# Patient Record
Sex: MALE | Race: WHITE | NOT HISPANIC OR LATINO | Employment: FULL TIME | URBAN - METROPOLITAN AREA
[De-identification: names, ages, dates, MRNs, and addresses within clinical notes are randomized per-mention and may not be internally consistent; named-entity substitution may affect disease eponyms.]

---

## 2017-04-27 ENCOUNTER — TRANSFERRED RECORDS (OUTPATIENT)
Dept: HEALTH INFORMATION MANAGEMENT | Facility: CLINIC | Age: 22
End: 2017-04-27

## 2020-01-29 ENCOUNTER — TRANSFERRED RECORDS (OUTPATIENT)
Dept: HEALTH INFORMATION MANAGEMENT | Facility: CLINIC | Age: 25
End: 2020-01-29

## 2021-03-09 ENCOUNTER — TELEPHONE (OUTPATIENT)
Dept: PSYCHIATRY | Facility: CLINIC | Age: 26
End: 2021-03-09

## 2021-03-09 NOTE — TELEPHONE ENCOUNTER
PSYCHIATRY CLINIC PHONE INTAKE     SERVICES REQUESTED / INTERESTED IN          Med Management    Presenting Problem and Brief History                              What would you like to be seen for? (brief description):  Pt was diagnosed with Bi-polar in 2018 and ADHD in 2019. Pt is currently taking 600mg of lithium, 150mg of effexor, and extended release adderall 40mg, but he takes just one 20mg pill in the morning. Pt is hoping to stop taking the adderall and wanted to discuss that with a new provider. No concerns with sleep or appetite.   Have you received a mental health diagnosis? Yes   Which one (s): Bi-polar and ADHD (most recent diagnosis)  Is there any history of developmental delay?  No   Are you currently seeing a mental health provider?  Yes            Who / month last seen:  Margaret Morales, Psychiatrist at [facility]  Do you have mental health records elsewhere?  Yes  Will you sign a release so we can obtain them?  Yes    Have you ever been hospitalized for psychiatric reasons?  No  Describe:  NA    Do you have current thoughts of self-harm?  No    Do you currently have thoughts of harming others?  No       Substance Use History     Do you have any history of alcohol / illicit drug use?  No  Describe:  NA  Have you ever received treatment for this?  No    Describe:  NA     Social History     Who is the patient's a guardian?  No    Name / number: NA  Have you had an ACT team in last 12 months?  No  Describe: NA   Do you have any current or past legal issues?  No  Describe: NA   OK to leave a detailed voicemail?  Yes    Medical/ Surgical History                                 There is no problem list on file for this patient.         Medications             No current outpatient medications on file.         DISPOSITION      3/9/21 Intake complete. Adding to [facility]-polar [facility].     Mehreen Khoury,

## 2021-05-01 ENCOUNTER — HEALTH MAINTENANCE LETTER (OUTPATIENT)
Age: 26
End: 2021-05-01

## 2021-05-14 ENCOUNTER — VIRTUAL VISIT (OUTPATIENT)
Dept: PSYCHIATRY | Facility: CLINIC | Age: 26
End: 2021-05-14
Attending: PSYCHIATRY & NEUROLOGY
Payer: COMMERCIAL

## 2021-05-14 DIAGNOSIS — Z79.899 ENCOUNTER FOR LONG-TERM CURRENT USE OF MEDICATION: ICD-10-CM

## 2021-05-14 DIAGNOSIS — F31.70 BIPOLAR I DISORDER IN REMISSION (H): Primary | ICD-10-CM

## 2021-05-14 DIAGNOSIS — F90.0 ATTENTION DEFICIT HYPERACTIVITY DISORDER (ADHD), PREDOMINANTLY INATTENTIVE TYPE: ICD-10-CM

## 2021-05-14 PROCEDURE — 90792 PSYCH DIAG EVAL W/MED SRVCS: CPT | Mod: GT | Performed by: STUDENT IN AN ORGANIZED HEALTH CARE EDUCATION/TRAINING PROGRAM

## 2021-05-14 RX ORDER — DEXTROAMPHETAMINE SULFATE, DEXTROAMPHETAMINE SACCHARATE, AMPHETAMINE SULFATE AND AMPHETAMINE ASPARTATE 5; 5; 5; 5 MG/1; MG/1; MG/1; MG/1
20 CAPSULE, EXTENDED RELEASE ORAL 2 TIMES DAILY
COMMUNITY
Start: 2021-03-25 | End: 2021-05-14

## 2021-05-14 RX ORDER — VENLAFAXINE HYDROCHLORIDE 150 MG/1
CAPSULE, EXTENDED RELEASE ORAL
Qty: 30 CAPSULE | Refills: 1 | Status: SHIPPED | OUTPATIENT
Start: 2021-05-14 | End: 2021-06-04

## 2021-05-14 RX ORDER — LITHIUM CARBONATE 300 MG/1
TABLET, FILM COATED, EXTENDED RELEASE ORAL
Qty: 60 TABLET | Refills: 1 | Status: SHIPPED | DISCHARGE
Start: 2021-05-14 | End: 2021-05-17

## 2021-05-14 RX ORDER — VENLAFAXINE HYDROCHLORIDE 150 MG/1
CAPSULE, EXTENDED RELEASE ORAL
COMMUNITY
Start: 2021-05-08 | End: 2021-05-14

## 2021-05-14 RX ORDER — LITHIUM CARBONATE 300 MG/1
TABLET, FILM COATED, EXTENDED RELEASE ORAL
COMMUNITY
Start: 2021-03-25 | End: 2021-05-14

## 2021-05-14 ASSESSMENT — PAIN SCALES - GENERAL: PAINLEVEL: NO PAIN (0)

## 2021-05-14 ASSESSMENT — PATIENT HEALTH QUESTIONNAIRE - PHQ9
10. IF YOU CHECKED OFF ANY PROBLEMS, HOW DIFFICULT HAVE THESE PROBLEMS MADE IT FOR YOU TO DO YOUR WORK, TAKE CARE OF THINGS AT HOME, OR GET ALONG WITH OTHER PEOPLE: EXTREMELY DIFFICULT
SUM OF ALL RESPONSES TO PHQ QUESTIONS 1-9: 21
SUM OF ALL RESPONSES TO PHQ QUESTIONS 1-9: 21

## 2021-05-14 NOTE — PROGRESS NOTES
"Video- Visit Details  Type of service:  video visit for diagnostic assessment  Time of service:    Date:  05/14/2021    Video Start Time:  2:01 PM   Video End Time:  3:30 PM     Reason for video visit:  Patient unable to travel due to Covid-19  Originating Site (patient location):  [facility]   Location- Patient's home  Distant Site (provider location):  [facility]  Mode of Communication:  Video Conference via AmWell  Consent:  Patient has given verbal consent for video visit?: Yes        [facility]  Psychiatry Clinic  [facility] [[facility]]  NEW PATIENT EVALUATION     CARE TEAM:  PCP- No Ref-Primary, Physician. Lux Bailey is   a 25 year old patient who prefers the name \"Efra\" and uses pronouns he, him, his, himself.   Referred by:  Vera Molina    History was provided by: patient who was a good historian.    CHIEF COMPLAINT                                                           \"I am looking for new care for my plethora of mental problems\"     PERTINENT BACKGROUND                        [most recent eval 05/14/21]     Previous diagnoses include depression, bipolar disorder (4 years ago) and ADHD (3 years ago). He recalls he grew up in a family where \"mental illness wasn't real\" but struggled with depression starting in 1st or 2nd grade, \"regularly had teachers pull me aside and ask if everything was OK at home.\" He reports a history of chronic suicidal ideation since 3rd grade, particularly when depressed. He has never \"actually attempted it.\" Has also had intrusive thoughts \"all the time\" about hurting or killing himself, despite not wanting to self-harm or having ever done it.     Episodes began to get worse and worse and he \"played the 'things are going to get better when I get to the next stage of life' game.\" He first started medications at the beginning of college, when he had his first manic episode, likely triggered by many factors " "including lack of sleep and substances, along with stress. Has not had a manic episode since he started lithium about 3 years ago. In college he got care through the university clinic. Then he was working with [facility] for some time, and had a psychiatrist that advised him to immediately go off all his mood stabilizers, which made him concerned and he wanted a second opinion.    Psych pertinent item history includes suicidal ideation, mutiple psychotropic trials , psych hosp (x1, but several psych ER visits) and substance use: alcohol, cannabis and hallucinogens    HISTORY OF PRESENT ILLNESS          Most recent history began about one month ago.    Will reports he is seeking to establish care and consider different options than Adderall for ADHD treatment. He has has several amphetamine formulation trials over time, and has found Adderall XR to be the only thing helpful for his inattentive-type ADHD, per report, but has not taken this in one month because of the concern about long-term effects of it on his heart. He finds it very difficult to focus at work and concerned about his performance without a medication for ADHD.     With regards to mood symptoms, he identifies with having chronic depression but episodes of marline; no marline since he has been on lithium. He feels the lithium has been helpful even though he is at a lower dose of it than he has taken in the past. He has also found Effexor helpful for depression and anxiety. However, he does describe low libido ever since starting psychiatric medications in general about 4 years ago. He is wondering which of these could be causing this side effect.    He describes chronic passive SI since childhood but also ego-dystonic intrusive thoughts of violence against self and others. He has attempted suicide though he reports as a teen he \"almost\" overdosed on acetaminophen. He has not to his knowledge been diagnosed with OCD.    RECENT PSYCH ROS:   Depression: chronic " "passive SI, depressed mood, poor concentration /memory and dysregulation  Elevated:  distractibility   Psychosis:  none  Anxiety:  obsessions [intrusive violent thoughts]  Trauma Related:  mood dysregulation  Eating Disorder: no  Other: no    Adverse Effects: low libido  Pertinent Negative Symptoms: No aggression, psychosis, marline or hypomania    Recent Substance Use:     Alcohol- none  Tobacco- none  Caffeine- 1 cup of coffee per day  Cannabis- none     Opioids- none           Narcan Kit- n/a   Other illicit drugs- none    FAMILY and SOCIAL HISTORY                                                       [per pt report]            Family Hx:  Father, paternal grandfather with alcoholism. Maternal grandfather and mother have bipolar disorder. Brothers with ADHD.  No FHx of   Paternal Grandmother with first heart attack at age 52.    Social Hx:  Financial/Work- working       Partner/ - has a girlfriend  Children- no      Living situation-independently      Social/ Spiritual Support- friends, girlfriend       Feels Safe at Home- yes   Legal- no  Trauma History (self-report)- none  Early History/Education-  alcoholic father    PAST PSYCHIATRIC HISTORY                           SIB- no  Suicidal Ideation Hx- yes   Suicide Attempt- #- no, most recent- N/A  but once \"almost tried to kill myself with acetaminophen.\"   Violence/Aggression Hx- no  Psychosis Hx- during marline only--paranoia  Eating Disorder Hx- no  Other- N/A    Psych Hosp- #- 1, most recent- 2018   Commitment- no  ECT- no  Outpatient Programs - N/A  Other- no    PAST MED TRIALS                                          \"Pretty much every recent-gen antipsychotic, but they suck\"  Lamictal \"didn't work at all\"  Lithium has been \"a miracle drug for me\"  Wellbutrin and Lexapro caused marline  Strattera caused hot flashes  Adderall IR \"I hated,\" Vyvanse didn't work, Dexedrine. He never tried a methylphenidate preparation.    PAST SUBSTANCE USE HISTORY             " "         Past Use- \"I did a lot of drugs in college,\" never drinker, no longer smokes cannabis  Treatment- #, most recent- no  Medical Consequences- no  HIV/Hepatitis- no  Legal Consequences- no  Other- N/A    MEDICAL HISTORY  and ALLERGY     ALLERGIES: Patient has no known allergies.     There is no problem list on file for this patient.      MEDICAL REVIEW OF SYSTEMS         A comprehensive review of systems was performed and is negative other than noted in the HPI.    MEDICATIONS          Current Outpatient Medications   Medication Sig Dispense Refill     ADDERALL XR 20 MG 24 hr capsule Take 20 mg by mouth 2 times daily hasn't taken in 1 month       lithium ER (LITHOBID) 300 MG CR tablet TAKE 2 TABLETS BY MOUTH AT BEDTIME       venlafaxine (EFFEXOR-XR) 150 MG 24 hr capsule TAKE 1CAPSULE BY MOUTH EVERY MORNING       VITALS                        There were no vitals taken for this visit.   MENTAL STATUS EXAM          Alertness: alert  and oriented  Appearance: adequately groomed and casually dressed  Behavior/Demeanor: cooperative, pleasant and calm, with good  eye contact   Speech: normal  Language: no obvious problem  Psychomotor: normal or unremarkable  Mood: description consistent with euthymia  Affect: full range and appropriate; congruent to: mood- yes, content- yes  Thought Process/Associations: unremarkable  Thought Content:  Reports chronic passive SI;  Denies violent ideation and delusions   Perception:  Reports none;  Denies hallucinations  Insight: good  Judgment: good and adequate for safety  Cognition: (6) oriented: time, person, and place  attention span: intact  concentration: intact  recent memory: intact  remote memory: intact  fund of knowledge: appropriate  Gait and Station: N/A (telehealth)    LABS and DATA     PHQ9 TODAY = 21  PHQ 5/14/2021   PHQ-9 Total Score 21   Q9: Thoughts of better off dead/self-harm past 2 weeks Nearly every day   F/U: Thoughts of suicide or self-harm Yes   F/U: Self " harm-plan No   F/U: Self-harm action No   F/U: Safety concerns No     No lab results found.  No lab results found.    PSYCHOTROPIC DRUG INTERACTIONS     Venlafaxine/methylphenidate/lithium: Additive serotonin syndrome risk  Venlafaxine/methylphenidate: Additive hypertension risk    MANAGEMENT:  Monitoring for adverse effects, routine vitals and patient is aware of risks    RISK STATEMENT for SAFETY     Lux Bailey endorsed chronic passive suicidal ideation as well as intrusive thoughts to harm self and others that are at baseline, upon which he has not acted. SUICIDE RISK ASSESSMENT-  Risk factors for self-harm: anxiety.  Mitigating factors: no h/o suicide attempt, no plan or intent, no h/o risky impulsive behavior, describes a safety plan, h/o seeking help , minimal substance use , future oriented, good social support  , stable housing and stable finances.  The patient does not appear to be at imminent risk for self-harm, hospitalization is not recommended which the pt does  agree to. No hospitalization will be arranged. Based on degree of symptoms close psych follow-up was/were recommended which the pt does  agree to. Additional steps to minimize risk: med changes.      DIAGNOSES                           Bipolar Disorder, type I, MRE manic, severe, with psychosis, in full remission  ADHD, inattentive type   Obsessive-compulsive symptoms, r/o OCD    ASSESSMENT                              Will is a 25-year-old man with PMHx including Bipolar I and ADHD, presenting to [facility] care, currently in a euthymic mood state on lithium and Effexor, but reporting significant difficulty with attention. He also describes chronic passive SI since childhood as well as intense emotions at times, indicating potential emotional dysregulation particularly in the context of invalidation during childhood. There is also a significant burden of egodystonic intrusive violent thoughts that may not only indicate  potential OCD but may also be coloring his experience of chronic suicidality in the absence of other mood symptoms. Recommend CBT to help distinguish between thoughts, feelings, emotions, and external reality.     Current lithium dose is low, and level is likely subtherapeutic, and he has not had labs in years, so will start with ordering those. Discussed long-term cardiovascular risks of stimulants; low-dose Adderall XR is not a high risk at this time for this physically healthy young man, but will offer Concerta as he has not tried a methylphenidate medication before. Discussed potential for hypomania/marline with stimulant addition, as well as reasons to call clinic/present to the ED.     [place]:  Indicates Adderall XR as prescribed, one 3-day prescription for Tylenol with codeine in September 2020.    PLAN                                                                                             1) Medications  - Lithium 600 mg PO at bedtime  - Effexor  mg PO daily  - Stop Adderall; Start Concerta 36 mg PO daily     2) Therapy-  Referral for CBT here placed    3) Next Due   Labs-Blades labs ordered and pending  EKG- PRN  Rating scales- N/A    4) Referrals  Therapy- CBT     5) RTC: 1 month    6) Crisis Numbers:   Provided routinely in AVS     After hours:  [phone]      TREATMENT RISK STATEMENT:  The risks, benefits, alternatives and potential adverse effects have been discussed and are understood by the pt. The pt understands the risks of using street drugs or alcohol. There are no medical contraindications, the pt agrees to treatment with the ability to do so. The pt knows to call the clinic for any problems or to access emergency care if needed.  Medical and substance use concerns are documented above.  Psychotropic drug interaction check was done, including changes made today.    PROVIDER: Angelika Alamo MD    Patient staffed in clinic with Dr. Ponce who will sign the note.   Supervisor is Dr. Fierro.    TELEHEALTH ATTENDING ATTESTATION  Following the ACGME guidelines on telemedicine and direct supervision due to COVID-19, I was concurrently participating in and/or monitoring the patient care through appropriate telecommunication technology.  I discussed the key portions of the service with the resident, including the mental status examination and developing the plan of care. I reviewed key portions of the history with the resident. I agree with the findings and plan as documented in this note.   Manuel Ponce MD      Answers for HPI/ROS submitted by the patient on 5/14/2021   If you checked off any problems, how difficult have these problems made it for you to do your work, take care of things at home, or get along with other people?: Extremely difficult  PHQ9 TOTAL SCORE: 21  Answers for HPI/ROS submitted by the patient on 5/14/2021   If you checked off any problems, how difficult have these problems made it for you to do your work, take care of things at home, or get along with other people?: Extremely difficult  PHQ9 TOTAL SCORE: 21

## 2021-05-14 NOTE — PROGRESS NOTES
"VIDEO VISIT  Lux Bailey is a 25 year old patient who is being evaluated via a billable video visit.      The patient has been notified of following:   \"This video visit will be conducted via a call between you and your physician/provider. We have found that certain health care needs can be provided without the need for an in-person physical exam. This service lets us provide the care you need with a video conversation. If a prescription is necessary we can send it directly to your pharmacy. If lab work is needed we can place an order for that and you can then stop by our lab to have the test done at a later time. Insurers are generally covering virtual visits as they would in-office visits so billing should not be different than normal.  If for some reason you do get billed incorrectly, you should contact the billing office to correct it and that number is in the AVS .    Video Conference to be completed via:  Masha    Patient has given verbal consent for video visit?:  Yes    Patient would prefer that any video invitations be sent by: Send to e-mail at: [email or web address]      How would patient like to obtain AVS?:  Alvarez    AVS SmartPhrase [PsychAVS] has been placed in 'Patient Instructions':  Yes  "

## 2021-05-14 NOTE — PATIENT INSTRUCTIONS
**For crisis resources, please see the information at the end of this document**     Patient Education      Thank you for coming to the [facility] [place] [facility].    Lab Testing:  If you had lab testing today and your results are reassuring or normal they will be mailed to you or sent through [employer] within 7 days. If the lab tests need quick action we will call you with the results. The phone number we will call with results is # [phone] (home) . If this is not the best number please call our clinic and change the number.    Medication Refills:  If you need any refills please call your pharmacy and they will contact us. Our fax number for refills is [phone]. Please allow three business for refill processing. If you need to  your refill at a new pharmacy, please contact the new pharmacy directly. The new pharmacy will help you get your medications transferred.     Scheduling:  If you have any concerns about today's visit or wish to schedule another appointment please call our office during normal business hours [phone] (8-5:00 M-F)    Contact Us:  Please call [phone] during business hours (8-5:00 M-F).  If after clinic hours, or on the weekend, please call  [phone].    Financial Assistance [phone]  [facility] Billing [phone]  Central Billing Office, [facility]: [phone]  [facility] Billing [phone]  Medical Records [phone]  [facility] Patient Bill of Rights [email or web address]       MENTAL HEALTH CRISIS NUMBERS:  For a medical emergency please call  911 or go to the nearest ER.     [facility]:   [facility] -[phone]   Crisis Residence [facility] Residence -[phone]   Walk-In Counseling Center [facility] -[phone]   COPE 24/7 [facility] Mobile Team -[phone] (adults)/[phone] (child)  CHILD: [facility] needs assessment  team - [phone]      [facility]:   [facility] - [phone]   Walk-in counseling [facility] - [phone]   Walk-in counseling [facility] - [phone]   Crisis Residence [facility] Residence - [phone]  Urgent Care Adult Mental [facility]-[phone] mobile unit/ 24/7 crisis line    National Crisis Numbers:   National Suicide Prevention Lifeline: 4-279-066-TALK (860-802-0317)  Poison Control Center - 8-707-727-0173  [place] [employer]/resources for a list of additional resources (SOS)  Trans Lifeline a hotline for transgender people 1-446.568.7395  The [employer] a hotline for LGBT youth 3-921-224-7634  Crisis Text Line: For any crisis 24/7   To: 257992  see www.crisistextline.org  - IF MAKING A CALL FEELS TOO HARD, send a text!         Again thank you for choosing [facility] and please let us know how we can best partner with you to improve you and your family's health.    You may be receiving a survey regarding this appointment. We would love to have your feedback, both positive and negative. The survey is done by an external company, so your answers are anonymous.

## 2021-05-15 ASSESSMENT — PATIENT HEALTH QUESTIONNAIRE - PHQ9: SUM OF ALL RESPONSES TO PHQ QUESTIONS 1-9: 21

## 2021-05-17 RX ORDER — LITHIUM CARBONATE 300 MG/1
TABLET, FILM COATED, EXTENDED RELEASE ORAL
Qty: 60 TABLET | Refills: 1 | Status: SHIPPED | OUTPATIENT
Start: 2021-05-17 | End: 2021-06-18

## 2021-05-17 RX ORDER — METHYLPHENIDATE HYDROCHLORIDE 18 MG/1
TABLET ORAL
Qty: 42 TABLET | Refills: 0 | Status: SHIPPED | OUTPATIENT
Start: 2021-05-17 | End: 2021-06-18

## 2021-06-02 DIAGNOSIS — F31.70 BIPOLAR I DISORDER IN REMISSION (H): ICD-10-CM

## 2021-06-02 DIAGNOSIS — Z79.899 ENCOUNTER FOR LONG-TERM CURRENT USE OF MEDICATION: ICD-10-CM

## 2021-06-02 LAB
ALBUMIN SERPL-MCNC: 4.4 G/DL (ref 3.4–5)
ALP SERPL-CCNC: 90 U/L (ref 40–150)
ALT SERPL W P-5'-P-CCNC: 49 U/L (ref 0–70)
ANION GAP SERPL CALCULATED.3IONS-SCNC: 7 MMOL/L (ref 3–14)
AST SERPL W P-5'-P-CCNC: 28 U/L (ref 0–45)
BASOPHILS # BLD AUTO: 0 10E9/L (ref 0–0.2)
BASOPHILS NFR BLD AUTO: 0.2 %
BILIRUB SERPL-MCNC: 0.8 MG/DL (ref 0.2–1.3)
BUN SERPL-MCNC: 18 MG/DL (ref 7–30)
CALCIUM SERPL-MCNC: 9.2 MG/DL (ref 8.5–10.1)
CHLORIDE SERPL-SCNC: 106 MMOL/L (ref 94–109)
CO2 SERPL-SCNC: 27 MMOL/L (ref 20–32)
CREAT SERPL-MCNC: 1.14 MG/DL (ref 0.66–1.25)
DIFFERENTIAL METHOD BLD: ABNORMAL
EOSINOPHIL # BLD AUTO: 0.3 10E9/L (ref 0–0.7)
EOSINOPHIL NFR BLD AUTO: 5.2 %
ERYTHROCYTE [DISTWIDTH] IN BLOOD BY AUTOMATED COUNT: 12.3 % (ref 10–15)
GFR SERPL CREATININE-BSD FRML MDRD: 90 ML/MIN/{1.73_M2}
GLUCOSE SERPL-MCNC: 91 MG/DL (ref 70–99)
HCT VFR BLD AUTO: 49.4 % (ref 40–53)
HGB BLD-MCNC: 17.2 G/DL (ref 13.3–17.7)
LITHIUM SERPL-SCNC: 0.42 MMOL/L (ref 0.6–1.2)
LYMPHOCYTES # BLD AUTO: 1.8 10E9/L (ref 0.8–5.3)
LYMPHOCYTES NFR BLD AUTO: 31.9 %
MCH RBC QN AUTO: 28.4 PG (ref 26.5–33)
MCHC RBC AUTO-ENTMCNC: 34.8 G/DL (ref 31.5–36.5)
MCV RBC AUTO: 82 FL (ref 78–100)
MONOCYTES # BLD AUTO: 0.4 10E9/L (ref 0–1.3)
MONOCYTES NFR BLD AUTO: 6.4 %
NEUTROPHILS # BLD AUTO: 3.2 10E9/L (ref 1.6–8.3)
NEUTROPHILS NFR BLD AUTO: 56.3 %
PLATELET # BLD AUTO: 201 10E9/L (ref 150–450)
POTASSIUM SERPL-SCNC: 4.1 MMOL/L (ref 3.4–5.3)
PROT SERPL-MCNC: 7.4 G/DL (ref 6.8–8.8)
RBC # BLD AUTO: 6.06 10E12/L (ref 4.4–5.9)
SODIUM SERPL-SCNC: 140 MMOL/L (ref 133–144)
SP GR UR STRIP: 1.02 (ref 1–1.03)
TSH SERPL DL<=0.005 MIU/L-ACNC: 0.98 MU/L (ref 0.4–4)
WBC # BLD AUTO: 5.6 10E9/L (ref 4–11)

## 2021-06-02 PROCEDURE — 36415 COLL VENOUS BLD VENIPUNCTURE: CPT | Performed by: STUDENT IN AN ORGANIZED HEALTH CARE EDUCATION/TRAINING PROGRAM

## 2021-06-02 PROCEDURE — 81003 URINALYSIS AUTO W/O SCOPE: CPT | Performed by: STUDENT IN AN ORGANIZED HEALTH CARE EDUCATION/TRAINING PROGRAM

## 2021-06-02 PROCEDURE — 80178 ASSAY OF LITHIUM: CPT | Performed by: STUDENT IN AN ORGANIZED HEALTH CARE EDUCATION/TRAINING PROGRAM

## 2021-06-02 PROCEDURE — 80050 GENERAL HEALTH PANEL: CPT | Performed by: STUDENT IN AN ORGANIZED HEALTH CARE EDUCATION/TRAINING PROGRAM

## 2021-06-04 ENCOUNTER — MYC MEDICAL ADVICE (OUTPATIENT)
Dept: PSYCHIATRY | Facility: CLINIC | Age: 26
End: 2021-06-04

## 2021-06-04 DIAGNOSIS — F31.70 BIPOLAR I DISORDER IN REMISSION (H): ICD-10-CM

## 2021-06-04 RX ORDER — VENLAFAXINE HYDROCHLORIDE 150 MG/1
CAPSULE, EXTENDED RELEASE ORAL
Qty: 90 CAPSULE | Refills: 0 | Status: SHIPPED | OUTPATIENT
Start: 2021-06-04 | End: 2021-06-18

## 2021-06-04 NOTE — TELEPHONE ENCOUNTER
- Meds refilled by provider   - Med tab changed to reflect this   - Patient notified via [employer]

## 2021-06-04 NOTE — TELEPHONE ENCOUNTER
Last seen: 5/14  RTC: none  Cancel: none   No-show: none   Next appt: 6/18    Incoming refill from patient via [employer]     Medication requested: venlafaxine (EFFEXOR-XR) 150 MG 24 hr capsule  Directions: TAKE 1 CAPSULE BY MOUTH EVERY MORNING  Qty: 60  Last refilled: 5/14    Per chart review, patient should have one refill on file. Placed a call to [facility] [address] [facility] - [place], [place] - [address] and confirmed one refill of 30 day supply. Per pharmacist, insurance is requesting a 90 day supply. Will route to provider for approval.

## 2021-06-16 DIAGNOSIS — F31.70 BIPOLAR I DISORDER IN REMISSION (H): ICD-10-CM

## 2021-06-18 ENCOUNTER — VIRTUAL VISIT (OUTPATIENT)
Dept: PSYCHIATRY | Facility: CLINIC | Age: 26
End: 2021-06-18
Attending: PSYCHIATRY & NEUROLOGY
Payer: COMMERCIAL

## 2021-06-18 DIAGNOSIS — F31.70 BIPOLAR I DISORDER IN REMISSION (H): ICD-10-CM

## 2021-06-18 DIAGNOSIS — F90.0 ATTENTION DEFICIT HYPERACTIVITY DISORDER (ADHD), PREDOMINANTLY INATTENTIVE TYPE: ICD-10-CM

## 2021-06-18 PROCEDURE — 99214 OFFICE O/P EST MOD 30 MIN: CPT | Mod: HN | Performed by: STUDENT IN AN ORGANIZED HEALTH CARE EDUCATION/TRAINING PROGRAM

## 2021-06-18 RX ORDER — LITHIUM CARBONATE 300 MG/1
TABLET, FILM COATED, EXTENDED RELEASE ORAL
Qty: 60 TABLET | Refills: 2 | Status: SHIPPED | OUTPATIENT
Start: 2021-06-18 | End: 2021-07-26

## 2021-06-18 RX ORDER — METHYLPHENIDATE HYDROCHLORIDE 36 MG/1
36 TABLET ORAL EVERY MORNING
Qty: 30 TABLET | Refills: 0 | Status: SHIPPED | OUTPATIENT
Start: 2021-06-18 | End: 2021-07-21

## 2021-06-18 ASSESSMENT — PATIENT HEALTH QUESTIONNAIRE - PHQ9
10. IF YOU CHECKED OFF ANY PROBLEMS, HOW DIFFICULT HAVE THESE PROBLEMS MADE IT FOR YOU TO DO YOUR WORK, TAKE CARE OF THINGS AT HOME, OR GET ALONG WITH OTHER PEOPLE: EXTREMELY DIFFICULT
SUM OF ALL RESPONSES TO PHQ QUESTIONS 1-9: 15
SUM OF ALL RESPONSES TO PHQ QUESTIONS 1-9: 15

## 2021-06-18 ASSESSMENT — PAIN SCALES - GENERAL: PAINLEVEL: NO PAIN (0)

## 2021-06-18 NOTE — PROGRESS NOTES
"VIDEO VISIT  Lux Bailey is a 25 year old patient who is being evaluated via a billable video visit.      The patient has been notified of following:   \"This video visit will be conducted via a call between you and your physician/provider. We have found that certain health care needs can be provided without the need for an in-person physical exam. This service lets us provide the care you need with a video conversation. If a prescription is necessary we can send it directly to your pharmacy. If lab work is needed we can place an order for that and you can then stop by our lab to have the test done at a later time. Insurers are generally covering virtual visits as they would in-office visits so billing should not be different than normal.  If for some reason you do get billed incorrectly, you should contact the billing office to correct it and that number is in the AVS .    Video Conference to be completed via:  Masha    Patient has given verbal consent for video visit?:  Yes    Patient would prefer that any video invitations be sent by: Send to e-mail at: [email or web address]      How would patient like to obtain AVS?:  Alvarez    AVS SmartPhrase [PsychAVS] has been placed in 'Patient Instructions':  Yes  "

## 2021-06-18 NOTE — PATIENT INSTRUCTIONS
**For crisis resources, please see the information at the end of this document**     Patient Education      Thank you for coming to the [facility] [place].    Lab Testing:  If you had lab testing today and your results are reassuring or normal they will be mailed to you or sent through [email or web address] within 7 days. If the lab tests need quick action we will call you with the results. The phone number we will call with results is # [phone] (home) . If this is not the best number please call our clinic and change the number.    Medication Refills:  If you need any refills please call your pharmacy and they will contact us. Our fax number for refills is [phone]. Please allow three business for refill processing. If you need to  your refill at a new pharmacy, please contact the new pharmacy directly. The new pharmacy will help you get your medications transferred.     Scheduling:  If you have any concerns about today's visit or wish to schedule another appointment please call our office during normal business hours [phone] (8-5:00 M-F)    Contact Us:  Please call [phone] during business hours (8-5:00 M-F).  If after clinic hours, or on the weekend, please call  [phone].    Financial Assistance [phone]  [facility] Billing [phone]  Central Billing Office, [facility]: [phone]  [place] Billing [phone]  Medical Records [phone]  [place] Patient Bill of Rights [email or web address]       MENTAL HEALTH CRISIS NUMBERS:  For a medical emergency please call  911 or go to the nearest ER.     [facility]:   [facility] -[phone]   Crisis Residence [facility] -[phone]   Walk-In [facility] [place] -[phone]   COPE 24/7 [place] Mobile Team -[phone] (adults)/[phone] (child)  CHILD: [facility] needs assessment  team - [phone]      [facility]:   [facility] - [phone]   Walk-in counseling [facility] - [phone]   Walk-in counseling [facility] - [phone]   Crisis Residence [facility] - [phone]  Urgent Care Adult Mental [place]-[phone] mobile unit/ 24/7 crisis line    National Crisis Numbers:   National Suicide Prevention Lifeline: 4-334-079-TALK (583-797-5032)  Poison Control Center - 6-190-148-7545  [employer]/resources for a list of additional resources (SOS)  Trans Lifeline a hotline for transgender people 1-591.239.2869  The [employer] a hotline for LGBT youth 9-435-994-4466  Crisis Text Line: For any crisis 24/7   To: 908254  see www.crisistextline.org  - IF MAKING A CALL FEELS TOO HARD, send a text!         Again thank you for choosing [facility] MENTAL HEALTH & ADDICTION [facility] and please let us know how we can best partner with you to improve you and your family's health.    You may be receiving a survey regarding this appointment. We would love to have your feedback, both positive and negative. The survey is done by an external company, so your answers are anonymous.

## 2021-06-18 NOTE — PROGRESS NOTES
"TELEPHONE VISIT  Lux Bailey is a 25 year old pt. who is being evaluated via a billable telephone visit.      The patient has been notified of the following:    We have found that certain health care needs can be provided without the need for a physical exam. This service lets us provide the care you need with a short phone conversation. If a prescription is necessary we can send it directly to your pharmacy. If lab work is needed we can place an order for that and you can then stop by our lab to have the test done at a later time. Insurers are generally covering virtual visits as they would in-office visits so billing should not be different than normal.  If for some reason you do get billed incorrectly, you should contact the billing office to correct it and that number is in the AVS .    Patient has given verbal consent for a telephone visit?:  Yes   How would the pt like to obtain the AVS?:  Patient declined  AVS SmartPhrase [PsychAVS] has been placed in 'Patient Instructions':  N/A     Start Time:  3:30 PM      End Time:  4:00 PM         [facility]  Psychiatry Clinic  Bipolar Resident Assessment Clinic [B[facility]]  Progress note     CARE TEAM:  PCP- No Ref-Primary, Physician. Lux Bailey is   a 25 year old patient who prefers the name \"Efra\" and uses pronouns he, him, his, himself.   Referred by:  Vera Molina    History was provided by: patient who was a good historian.    PERTINENT BACKGROUND                        [most recent eval 05/14/21]     Previous diagnoses include depression, bipolar disorder (4 years ago) and ADHD (3 years ago). He recalls he grew up in a family where \"mental illness wasn't real\" but struggled with depression starting in 1st or 2nd grade, \"regularly had teachers pull me aside and ask if everything was OK at home.\" He reports a history of chronic suicidal ideation since 3rd grade, particularly when depressed. He has never \"actually attempted " "it.\" Has also had intrusive thoughts \"all the time\" about hurting or killing himself, despite not wanting to self-harm or having ever done it.     Episodes began to get worse and worse and he \"played the 'things are going to get better when I get to the next stage of life' game.\" He first started medications at the beginning of college, when he had his first manic episode, likely triggered by many factors including lack of sleep and substances, along with stress. Has not had a manic episode since he started lithium about 3 years ago. In college he got care through the university clinic. Then he was working with [facility] for some time, and had a psychiatrist that advised him to immediately go off all his mood stabilizers, which made him concerned and he wanted a second opinion.    Psych pertinent item history includes suicidal ideation, mutiple psychotropic trials , psych hosp (x1, but several psych ER visits) and substance use: alcohol, cannabis and hallucinogens      INTERIM HISTORY      [4, 4]   The patient reports adequate treatment adherence.  History was provided by the patient who was a good historian.  The last visit ended with the following med change: Concerta started.    Since the last visit:   Will reports that he has been doing fairly well since the last visit. He ran out of Effexor but instead of getting a refill, just decided to see how he would do without it. He has not noticed any change in mood, anxiety, or attention symptoms since then. No change in chronic passive SI.  He does feel that the Concerta has been helpful with concentration. Has not had any chest pain, palpitations, or difficulty sleeping.    RECENT PSYCH ROS:   Depression: chronic passive SI, depressed mood, poor concentration /memory and dysregulation  Elevated:  distractibility   Psychosis:  none  Anxiety:  obsessions [intrusive violent thoughts]  Trauma Related:  mood dysregulation  Eating Disorder: no  Other: no    Adverse Effects: low " libido  Pertinent Negative Symptoms: No aggression, psychosis, marline or hypomania    Recent Substance Use:     Alcohol- none  Tobacco- none  Caffeine- 1 cup of coffee per day  Cannabis- none     Opioids- none           Narcan Kit- n/a   Other illicit drugs- none    PSYCH and SUBSTANCE USE Critical Summary Points since July 2020 5/14/21: BARC eval    MEDICAL HISTORY  and ALLERGY     ALLERGIES: Patient has no known allergies.     There is no problem list on file for this patient.      MEDICAL REVIEW OF SYSTEMS         A comprehensive review of systems was performed and is negative other than noted in the HPI.    MEDICATIONS          Current Outpatient Medications   Medication Sig Dispense Refill     lithium ER (LITHOBID) 300 MG CR tablet TAKE 2 TABLETS BY MOUTH AT BEDTIME 60 tablet 2     methylphenidate HCl ER (CONCERTA) 36 MG CR tablet Take 1 tablet (36 mg) by mouth every morning 30 tablet 0     VITALS                        There were no vitals taken for this visit.   MENTAL STATUS EXAM          Alertness: alert  and oriented  Appearance: adequately groomed and casually dressed  Behavior/Demeanor: cooperative, pleasant and calm, with good  eye contact   Speech: normal  Language: no obvious problem  Psychomotor: normal or unremarkable  Mood: description consistent with euthymia  Affect: full range and appropriate; congruent to: mood- yes, content- yes  Thought Process/Associations: unremarkable  Thought Content:  Reports chronic passive SI;  Denies violent ideation and delusions   Perception:  Reports none;  Denies hallucinations  Insight: good  Judgment: good and adequate for safety  Cognition: (6) oriented: time, person, and place  attention span: intact  concentration: intact  recent memory: intact  remote memory: intact  fund of knowledge: appropriate  Gait and Station: N/A (telehealth)    LABS and DATA     PHQ9 TODAY = 15  PHQ 5/14/2021 6/18/2021   PHQ-9 Total Score 21 15   Q9: Thoughts of better off  dead/self-harm past 2 weeks Nearly every day More than half the days   F/U: Thoughts of suicide or self-harm Yes Yes   F/U: Self harm-plan No No   F/U: Self-harm action No No   F/U: Safety concerns No No     Recent Labs   Lab Test 06/02/21  0827   CR 1.14   GFRESTIMATED 90     Recent Labs   Lab Test 06/02/21 0827   AST 28   ALT 49   ALKPHOS 90       PSYCHOTROPIC DRUG INTERACTIONS     Venlafaxine/methylphenidate/lithium: Additive serotonin syndrome risk  Venlafaxine/methylphenidate: Additive hypertension risk    MANAGEMENT:  Monitoring for adverse effects, routine vitals and patient is aware of risks    RISK STATEMENT for SAFETY     Lux BIANCHI Leoopal Bailey endorsed chronic passive suicidal ideation as well as intrusive thoughts to harm self and others that are at baseline, upon which he has not acted. SUICIDE RISK ASSESSMENT-  Risk factors for self-harm: anxiety.  Mitigating factors: no h/o suicide attempt, no plan or intent, no h/o risky impulsive behavior, describes a safety plan, h/o seeking help , minimal substance use , future oriented, good social support  , stable housing and stable finances.  The patient does not appear to be at imminent risk for self-harm, hospitalization is not recommended which the pt does  agree to. No hospitalization will be arranged. Based on degree of symptoms close psych follow-up was/were recommended which the pt does  agree to. Additional steps to minimize risk: med changes.      DIAGNOSES                           Bipolar Disorder, type I, MRE manic, severe, with psychosis, in full remission  ADHD, inattentive type   Obsessive-compulsive symptoms, r/o OCD    ASSESSMENT                              Will is a 25-year-old man presenting for follow-up on Bipolar I and ADHD, reporting euthymia on lithium and Concerta; he did stop Effexor on his own and did not experience withdrawal symptoms, not interested in restarting. He asks about low libido, which has not returned to  normal after a few weeks off of Effexor; counseled him that this could still return, but persistent low libido could also be attributed to several other etiologies including lithium side effect, hormonal issue, or psychological issue. Monitoring labs for lithium showed low level of 0.42, all other labs normal. He is not interested in increasing the lithium dose at this time, but would have low threshold to do so if mood symptoms emerge (either depression or elevated symptoms). Given adequate ADHD symptom control on Concerta 36 mg will maintain this dose.       MNPMP was checked today:  Indicates taking controlled medication as prescribed.    PLAN                                                                                             1) Medications  - Lithium 600 mg PO at bedtime  - Continue Concerta 36 mg PO daily     2) Therapy-  Referral for CBT here placed    3) Next Due   Labs-Lithium labs due in 6-12 months  EKG- PRN  Rating scales- N/A    4) Referrals  Therapy- CBT     5) RTC: 1 month with new resident    6) Crisis Numbers:   Provided routinely in AVS     After hours:  [phone]      TREATMENT RISK STATEMENT:  The risks, benefits, alternatives and potential adverse effects have been discussed and are understood by the pt. The pt understands the risks of using street drugs or alcohol. There are no medical contraindications, the pt agrees to treatment with the ability to do so. The pt knows to call the clinic for any problems or to access emergency care if needed.  Medical and substance use concerns are documented above.  Psychotropic drug interaction check was done, including changes made today.    PROVIDER: Angelika Alamo MD    Patient not staffed in clinic.  Note will be reviewed and signed by supervisor Dr. Ponce.    TELEHEALTH ATTENDING ATTESTATION    Attestation:  I, Manuel Ponce MD, discussed this patientwith Dr Alamo and I have reviewed the resident's documentation.  I have edited the  note to reflect all relevant changes. I agree with resident plan in this resident H&P.  I have reviewed all vitals and laboratory findings.  Manuel Ponce MD

## 2021-06-19 ASSESSMENT — PATIENT HEALTH QUESTIONNAIRE - PHQ9: SUM OF ALL RESPONSES TO PHQ QUESTIONS 1-9: 15

## 2021-07-19 DIAGNOSIS — F90.0 ATTENTION DEFICIT HYPERACTIVITY DISORDER (ADHD), PREDOMINANTLY INATTENTIVE TYPE: ICD-10-CM

## 2021-07-19 RX ORDER — LITHIUM CARBONATE 300 MG/1
TABLET, FILM COATED, EXTENDED RELEASE ORAL
Qty: 180 TABLET | Refills: 1 | OUTPATIENT
Start: 2021-07-19

## 2021-07-19 NOTE — TELEPHONE ENCOUNTER
Last seen: 6/18  RTC: 1 month with new resident  Non-provider cancel: None  No-show: None  Next appt: 7/26 (Jovanny)     Incoming refill from patient via telephone     Medication requested:   Disp Refills Start End KD   methylphenidate HCl ER (CONCERTA) 36 MG CR tablet 30 tablet 0 6/18/2021 7/18/2021 No   Sig - Route: Take 1 tablet (36 mg) by mouth every morning   Last refilled: 6/18 per [place]      Medication refill approved per refill protocol.  Routed to provider to sign and send.

## 2021-07-19 NOTE — TELEPHONE ENCOUNTER
----- Message from Margaret Self RN sent at 2021 11:48 AM CDT -----  Regarding: FW: Rx Refill - Jovanny  Contact: [phone]    ----- Message -----  From: Sulema Prakash  Sent: 2021  11:34 AM CDT  To: [facility]  Subject: Rx Refill - Jovanny                              [facility]    Phone Message    May a detailed message be left on voicemail: yes     Reason for Call: Medication Refill Request    Has the patient contacted the pharmacy for the refill? Yes   Name of medication being requested: methylphenidate HCl ER (CONCERTA) 36 MG CR tablet()  Provider who prescribed the medication: Dr. Alamo  Pharmacy: [id number] - [place], [place] - [address] (Ph: [phone])  Date medication is needed: ASAP - completely out      Action Taken: Message routed to:  Other: nursing    Travel Screening: Not Applicable

## 2021-07-21 RX ORDER — METHYLPHENIDATE HYDROCHLORIDE 36 MG/1
36 TABLET ORAL EVERY MORNING
Qty: 30 TABLET | Refills: 0 | Status: SHIPPED | OUTPATIENT
Start: 2021-07-21 | End: 2021-07-26

## 2021-07-21 NOTE — TELEPHONE ENCOUNTER
7/21/21 1153 Genaro Palacio MD     E-Prescribing Status: Receipt confirmed by pharmacy (7/21/2021 11:53 AM CDT)    Routed message to pt via [employer].

## 2021-07-23 NOTE — PROGRESS NOTES
"VIDEO VISIT  Lux Bailey is a 25 year old patient who is being evaluated via a billable video visit.      The patient has been notified of following:   \"We have found that certain health care needs can be provided without the need for an in-person physical exam. This service lets us provide the care you need with a video conversation. If a prescription is necessary we can send it directly to your pharmacy. If lab work is needed we can place an order for that and you can then stop by our lab to have the test done at a later time. Video visits are billed at different rates depending on your insurance coverage. Please reach out to your insurance provider with any questions. If during the course of the call it appears that a video visit is not appropriate, you will not be charged for this service.\"    Patient has given verbal consent for video visit?: Yes   How would you like to obtain your AVS?: Bookeen  AVS SmartPhrase [PsychAVS] has been placed in 'Patient Instructions': Yes      Video- Visit Details  Type of service:  video visit for medication management  Time of service:    Date:  07/26/2021    Video Start Time:  10:17 AM        Video End Time:  11:30 am    Reason for video visit:  Patient has requested telehealth visit  Originating Site (patient location):  [facility]   Location- Patient's home  Distant Site (provider location):  [facility]  Mode of Communication:  [facility] Conference [facility]  Consent:  Patient has given verbal consent for video visit?: Yes     Video visit was attempted and no video feed was available through and well.  Visit was completed using and well audio only and later over telephone.       [facility]  Psychiatry Clinic  TRANSFER of CARE DIAGNOSTIC ASSESSMENT     CARE TEAM:  PCP- Physician No Ref-Primary, Psychotherapist- None,     Lux Bailey is a 25 year old who prefers the name Will and uses pronouns he, him, himself.      DIAGNOSIS " "  Bipolar Disorder type 1, most recent episode manic, severe, with psychosis, in full remission  ADHD, inattentive type  Obessive compulsive symptoms, r/o OCD     ASSESSMENT   Today: Regino is a 25-year-old man presenting for follow-up care for treatment of bipolar 1 disorder and ADHD.  He reports he self discontinued venlafaxine due to emotional blunting and affective flattening approximately 8 weeks ago.  He reports some withdrawal symptoms have since resolved after stopping venlafaxine and reports benefit in his mood after stopping this medication.  He reports his mood as \"very good\" and that he is \"the most positive and happy I've been for years\".  He reports sleeping approximately 6 to 7 hours per night consistently denies current manic symptoms.  Of note he reports feeling \"more on edge\" and reports difficulties with focus and motivation since stopping this medication.  He reports he is satisfied with his current dose of Concerta and desires to eventually transition to a nonstimulant ADHD medication.  He endorses a long history of chronic passive SI since childhood that has been unchanged.  Given Regino's subtherapeutic lithium level, increased feelings of being \"on edge\" and recent mood symptoms increasing his dose of lithium is indicated.  Patient was amenable to this plan and agreed to slow up titration of lithium.  Additionally patient expressed interest in CBT for ADHD and a psychotherapy referral was placed.  Given good control of current ADHD symptoms on Concerta no changes indicated at this time and switching to a nonstimulant ADHD medication will be explored at subsequent visits.    MNPMP was checked today:  Indicates taking controlled medication as prescribed.     PLAN                                                                                                                1) Meds-  - Increase lithium 750 mg PO at bedtime for 2 weeks, then increase to 900 mg  - Continue Concerta 36 mg PO qam    Non " "prescribed medications:  - magnesium supplement and daily multivitamin qhs    2) Psychotherapy- Interested in therapy for ADHD and eating disorder history. Referral placed.     3) Next due-  Labs- 6/2022, Lithium labs due with dose increase. Order placed today for 8/16/2021 lithium level.  EKG- As needed  Rating scales-PHQ 9 due at next in person visit    4) Referrals-  [facility] referral placed today    5) Dispo- Return to clinic in 4 weeks.        PERTINENT BACKGROUND                           [most recent eval 07/23/21]   Previous diagnoses include depression, schizophrenia 2016 when starting college and ADHD 2017. Was initially diagnosed with schizophrenia and placed on antipsychotic for 2 years. In 2018 diagnosis changed from schizophrenia to bipolar disorder and placed on lamotrigine. Lamotrigine was not effective and transitioned to lithium at end of 2018. Mood has been stable with no manic episodes since starting lithium. Mood symptoms have been well managed at subtherapeutic lithium levels. He recalls he grew up in a family where \"mental illness wasn't real\" but struggled with depression starting in 1st or 2nd grade, \"regularly had teachers pull me aside and ask if everything was OK at home.\" He reports a history of chronic suicidal ideation since 3rd grade, particularly when depressed. He has never \"actually attempted it.\" Has also had intrusive thoughts \"all the time\" about hurting or killing himself, despite not wanting to self-harm or having ever done it.      Episodes began to get worse and worse and he \"played the 'things are going to get better when I get to the next stage of life' game.\" He first started medications at the beginning of college, when he had his first manic episode, likely triggered by many factors including lack of sleep , along with stress. Has not had a manic episode since he started lithium about 2018/2019.  In college he got care through the university clinic. " "Then he was working with [facility] for some time, and had a psychiatrist that advised him to immediately go off all his mood stabilizers, which made him concerned and he wanted a second opinion.    Psych pertinent item history includes suicidal ideation, mutiple psychotropic trials , psych hosp (x1, but several psych ER visits) and substance use: alcohol, cannabis and hallucinogens     SUBJECTIVE   Since the last visit: Will reports that he felt awful when withdrawing on venlafaxine and these withdrawal symptoms have since resolved and he now feels much better.  Venlafaxine was discontinued due to reported affect of flattening and emotional blunting that he experienced on a dose of 150 mg for the past 2 years.  He states that he is \"feeling the most positive and happy that it been for years\" after discontinuing venlafaxine.  He reports ongoing concern for low motivation and feelings of apathy.  He reports his current dose of Concerta is working better than his previous experiences with Adderall and other stimulant medications.  He is interested in switching to a nonstimulant medication for treatment of his ADHD symptoms in the future out of concern for health risks associated with being on long-term stimulants.  He reports he previously took Strattera for approximately 1 month and discontinued due to bad side effects but feels this was not an adequate trial.  On his current dose of Concerta he states he does not feel overstimulated and reports only dry mouth as a side effect.  We will expresses that he gets approximately 6 to 7 hours of sleep per night.  We will states that he feels \"more on edge\" recently after discontinuing venlafaxine.  He endorses feeling symptoms of depression and passive SI since first or second grade.  He endorses a history of eating disorder with restricting and binging behaviors which he states he no longer does although he continues to express concern about his body image and reports he has " started going to the gym again.  He states anxiety is well controlled and denies any psychosis, violent ideation or acute safety concerns.    RECENT PSYCH ROS:   Depression:  suicidal ideation without plan, without intent, poor concentration /memory, overwhelmed and mood dysregulation  Elevated:  distractibility , racing thoughts and mood dysregulation  Psychosis:  none  Anxiety:  obsessions [cleaning apartment], compulsions [cleaning] and overwhelmed  Trauma Related:  fear  Sleep: Regular sleep  Other: N/A    Adverse Effects: dry mouth r/t Concerta  Pertinent Negative Symptoms: No violent ideation, psychosis, hallucinations or hypomania  Recent Substance Use:     Alcohol- none  Tobacco- none  Caffeine- 1 -2 cups of coffee per day  Cannabis- none     Opioids- none           Narcan Kit- n/a   Other illicit drugs- none, no use in 2 years     FAMILY and SOCIAL HISTORY                                 pt reported     Family Hx:   Father, paternal grandfather with alcoholism.  Maternal grandfather and mother have bipolar disorder.   Brother with ADHD.  Parkinson's and Alzheimer's on both sides of family. Nothing diagnosed in the immediate family.     Social Hx:  Financial/ Work- Working,  for [employer]  Partner/ - girlfriend  Children- None      Living situation- lives in [place] in apartment alone.   Social/ Spiritual Support- friends, girlfriend, family     Feels Safe at Home- yes   Legal- None     Trauma History (self-report)- None  Early History/Education- Born in [place], [place]. Moved often in childhood in [place], [place], [place], [place], [place]. Grew up poor on food stamps during childhood. Did well academically until high school, not many friends until high school. Oldest of 3 brothers. Didn't finish college d/t COVID and related job loss. Dual majored in informatics and math, minors in bio and computer science. 1 semester away from graduation. Lived on own since getting job.      PSYCHIATRIC HISTORY     SIB-  "None  Suicide Attempt [#, most recent]- None  Suicidal Ideation Hx- yes, near overdose on APAP in 2018    Violence/Aggression Hx- None  Psychosis Hx- yes, during marline, auditory hallucinations  Eating Disorder Hx- yes, restricting, binging in college. Better now.   Other- None    Psych Hosp [#, most recent]- 1, in 2018 for marline in the context of insomnia and substance use (LSD and ketamine)  Commitment- None  ECT- None  Outpatient Programs - None  Other - N/A     PAST MED TRIALS   6/18/21: Stopped effecxor on own  7/26/21: Lithium increased to 750 mg for 2 weeks then to 900 mg if well-tolerated.       Medication Max Dose (mg) Dates / Duration Helpful? DC Reason / Adverse Effects?   lamotrigine    Lack of efficacy   lithium 900 current Y Leslie \"on edge\" at higher doses (900)   bupropion    marline   escitalopram    marline   atomoxetine   N Hot flashes, 1 month trial   Adderall IR    \"I hated\"   lisdexamfetamine    Lack of efficacy   dexadrine       Numerous SGA's    \"they suck\"   methylphenidate 36 current     Venlafaxine  150  6100-1783 Y  initially helpful, discontinued due to affect of flattening and emotional blunting.      SUBSTANCE USE HISTORY     Past Use- Numerous drugs in college, former cannabis use. LSD, ketamine, cannabis, DMT, psylocybin, research chemicals. No opioids or benzodiazapines  Treatment- #, most recent- no  Medical Consequences- no  Legal Consequences- no  Other- N/A     MEDICAL HISTORY and ALLERGY     ALLERGIES: Patient has no known allergies.    There is no problem list on file for this patient.       MEDICAL REVIEW OF SYSTEMS   Contraception- vasectomy    A comprehensive review of systems was performed and is negative other than noted in the HPI.     MEDICATIONS     Current Outpatient Medications   Medication Sig Dispense Refill     lithium ER (LITHOBID) 300 MG CR tablet TAKE 2 TABLETS BY MOUTH AT BEDTIME 60 tablet 2     methylphenidate (CONCERTA) 36 MG CR tablet Take 1 tablet (36 mg) by " "mouth every morning 30 tablet 0      VITALS   There were no vitals taken for this visit.    MENTAL STATUS EXAM     Alertness: alert  and oriented  Appearance: N/A (phone visit)  Behavior/Demeanor: cooperative, with N/A (phone visit) eye contact   Speech: normal and regular rate and rhythm  Language: intact and no problems  Psychomotor: N/A (phone visit)  Mood: \"very good\"  Affect: N/A (phone visit);   Thought Process/Associations: unremarkable  Thought Content:  Reports suicidal ideation without plan; without intent [details in history]; reports preoccupation and some obsessive behaviors regarding cleaning his apartment denies violent ideation and delusions   Perception:  Reports none;  Denies auditory hallucinations and visual hallucinations  Insight: good  Judgment: good  Cognition: does  appear grossly intact; formal cognitive testing was not done  oriented: time, person, and place  attention span: intact  concentration: intact  recent memory: intact  remote memory: intact  fund of knowledge: delayed  Gait and Station: N/A (telehealth)     LABS and DATA     PHQ 5/14/2021 6/18/2021   PHQ-9 Total Score 21 15   Q9: Thoughts of better off dead/self-harm past 2 weeks Nearly every day More than half the days   F/U: Thoughts of suicide or self-harm Yes Yes   F/U: Self harm-plan No No   F/U: Self-harm action No No   F/U: Safety concerns No No       Recent Labs   Lab Test 06/02/21  0827   CR 1.14   GFRESTIMATED 90     Recent Labs   Lab Test 06/02/21  0827   AST 28   ALT 49   ALKPHOS 90     Recent Labs   Lab Test 06/02/21  0827   LITHIUM 0.42*     Recent Labs   Lab Test 06/02/21  0827   CR 1.14   GFRESTIMATED 90      POTASSIUM 4.1   JODI 9.2     Recent Labs   Lab Test 06/02/21  0827   SG 1.025     Recent Labs   Lab Test 06/02/21  0827   TSH 0.98     Recent Labs   Lab Test 06/02/21  0827   WBC 5.6   ANEU 3.2         ECG: None on file     PSYCHOTROPIC DRUG INTERACTIONS     No significant drug interactions    MANAGEMENT: "  Monitoring for adverse effects, routine vitals and routine labs     RISK STATEMENT for SAFETY     Lux Bailey did not appear to be an imminent safety risk to self or others.    TREATMENT RISK STATEMENT: The risks, benefits, alternatives and potential adverse effects have been discussed and are understood by the pt. The pt understands the risks of using street drugs or alcohol. There are no medical contraindications, the pt agrees to treatment with the ability to do so. The pt knows to call the clinic for any problems or to access emergency care if needed.  Medical and substance use concerns are documented above.  Psychotropic drug interaction check was done, including changes made today.    PROVIDER: Lupillo Petty MD    Patient staffed in clinic with Dr. Ponce who will sign the note.  Supervisor is Dr. Lozano.      TELEHEALTH ATTENDING ATTESTATION  Following the ACGME guidelines on telemedicine and direct supervision due to COVID-19, I was concurrently participating in and/or monitoring the patient care through appropriate telecommunication technology.  I discussed the key portions of the service with the resident, including the mental status examination and developing the plan of care. I reviewed key portions of the history with the resident. I agree with the findings and plan as documented in this note.   Manuel Ponce MD

## 2021-07-26 ENCOUNTER — VIRTUAL VISIT (OUTPATIENT)
Dept: PSYCHIATRY | Facility: CLINIC | Age: 26
End: 2021-07-26
Attending: PSYCHIATRY & NEUROLOGY
Payer: COMMERCIAL

## 2021-07-26 DIAGNOSIS — F90.0 ATTENTION DEFICIT HYPERACTIVITY DISORDER (ADHD), PREDOMINANTLY INATTENTIVE TYPE: Primary | ICD-10-CM

## 2021-07-26 DIAGNOSIS — F31.70 BIPOLAR I DISORDER IN REMISSION (H): ICD-10-CM

## 2021-07-26 PROCEDURE — 99215 OFFICE O/P EST HI 40 MIN: CPT | Mod: GC | Performed by: STUDENT IN AN ORGANIZED HEALTH CARE EDUCATION/TRAINING PROGRAM

## 2021-07-26 RX ORDER — METHYLPHENIDATE HYDROCHLORIDE 36 MG/1
36 TABLET ORAL EVERY MORNING
Qty: 30 TABLET | Refills: 0 | Status: SHIPPED | OUTPATIENT
Start: 2021-08-18 | End: 2021-08-23

## 2021-07-26 RX ORDER — LITHIUM CARBONATE 300 MG/1
TABLET, FILM COATED, EXTENDED RELEASE ORAL
Qty: 90 TABLET | Refills: 1 | Status: SHIPPED | OUTPATIENT
Start: 2021-07-26 | End: 2021-08-23

## 2021-07-26 ASSESSMENT — PAIN SCALES - GENERAL: PAINLEVEL: NO PAIN (0)

## 2021-07-26 NOTE — PROGRESS NOTES
"VIDEO VISIT  Lux Bailey is a 25 year old patient who is being evaluated via a billable video visit.      The patient has been notified of following:   \"This video visit will be conducted via a call between you and your physician/provider. We have found that certain health care needs can be provided without the need for an in-person physical exam. This service lets us provide the care you need with a video conversation. If a prescription is necessary we can send it directly to your pharmacy. If lab work is needed we can place an order for that and you can then stop by our lab to have the test done at a later time. Insurers are generally covering virtual visits as they would in-office visits so billing should not be different than normal.  If for some reason you do get billed incorrectly, you should contact the billing office to correct it and that number is in the AVS .    Video Conference to be completed via:  Masha    Patient has given verbal consent for video visit?:  Yes    Patient would prefer that any video invitations be sent by: Send to e-mail at: [email or web address]      How would patient like to obtain AVS?:  Alvarez    AVS SmartPhrase [PsychAVS] has been placed in 'Patient Instructions':  Yes    "

## 2021-07-26 NOTE — PATIENT INSTRUCTIONS
**For crisis resources, please see the information at the end of this document**     Patient Education      Thank you for coming to the [facility] & [facility] [place] [facility].    Lab Testing:  If you had lab testing today and your results are reassuring or normal they will be mailed to you or sent through Sharematic within 7 days. If the lab tests need quick action we will call you with the results. The phone number we will call with results is # [phone] (home) . If this is not the best number please call our clinic and change the number.    Medication Refills:  If you need any refills please call your pharmacy and they will contact us. Our fax number for refills is [phone]. Please allow three business for refill processing. If you need to  your refill at a new pharmacy, please contact the new pharmacy directly. The new pharmacy will help you get your medications transferred.     Scheduling:  If you have any concerns about today's visit or wish to schedule another appointment please call our office during normal business hours [phone] (8-5:00 M-F)    Contact Us:  Please call [phone] during business hours (8-5:00 M-F).  If after clinic hours, or on the weekend, please call  [phone].    Financial Assistance [phone]  [facility] Billing [phone]  Central Billing Office, [facility]: [phone]  [facility] Billing [phone]  Medical Records [phone]  [facility] Patient Bill of Rights [email or web address]       MENTAL HEALTH CRISIS NUMBERS:  For a medical emergency please call  911 or go to the nearest ER.     [facility]:   [facility] -[phone]   Crisis Residence [facility] Residence -[phone]   Walk-In Counseling Center [facility] -[phone]   COPE 24/7 [place] Mobile Team -[phone] (adults)/[phone] (child)  CHILD: [facility] needs assessment  team - [phone]      [facility]:   [facility] - [phone]   Walk-in counseling [facility] - [phone]   Walk-in counseling [facility] - [phone]   Crisis Residence [facility] - [phone]  Urgent Care Adult Mental Ydxwte-[phone] mobile unit/ 24/7 crisis line    National Crisis Numbers:   National Suicide Prevention Lifeline: 7-482-371-TALK (137-861-3940)  Poison Control Center - 1-415-495-3394  Locality/resources for a list of additional resources (SOS)  Trans Lifeline a hotline for transgender people 1-975.972.3692  The [employer] Project a hotline for LGBT youth 7-647-322-4647  Crisis Text Line: For any crisis 24/7   To: 414628  see www.crisistextline.org  - IF MAKING A CALL FEELS TOO HARD, send a text!         Again thank you for choosing [facility] MENTAL HEALTH & ADDICTION [facility] and please let us know how we can best partner with you to improve you and your family's health.    You may be receiving a survey regarding this appointment. We would love to have your feedback, both positive and negative. The survey is done by an external company, so your answers are anonymous.         Treatment Plan Today:     1) Medications-increase lithium to 750 mg at bedtime for 2 weeks then if well tolerated increase to 950 mg at bedtime.  Continue Concerta as prescribed.    2) Follow-up appt with Dr Petty in 4 weeks for a medication follow-up visit.    3) Crisis numbers are below and clinic after hours number is [phone]

## 2021-08-23 ENCOUNTER — VIRTUAL VISIT (OUTPATIENT)
Dept: PSYCHIATRY | Facility: CLINIC | Age: 26
End: 2021-08-23
Attending: PSYCHIATRY & NEUROLOGY
Payer: COMMERCIAL

## 2021-08-23 DIAGNOSIS — F31.70 BIPOLAR I DISORDER IN REMISSION (H): Primary | ICD-10-CM

## 2021-08-23 DIAGNOSIS — F90.0 ATTENTION DEFICIT HYPERACTIVITY DISORDER (ADHD), PREDOMINANTLY INATTENTIVE TYPE: ICD-10-CM

## 2021-08-23 DIAGNOSIS — F31.70 BIPOLAR I DISORDER IN REMISSION (H): ICD-10-CM

## 2021-08-23 PROCEDURE — 99214 OFFICE O/P EST MOD 30 MIN: CPT | Mod: GT | Performed by: STUDENT IN AN ORGANIZED HEALTH CARE EDUCATION/TRAINING PROGRAM

## 2021-08-23 RX ORDER — LITHIUM CARBONATE 300 MG/1
600 TABLET, FILM COATED, EXTENDED RELEASE ORAL AT BEDTIME
Qty: 60 TABLET | Refills: 1 | Status: SHIPPED | OUTPATIENT
Start: 2021-08-23 | End: 2021-09-22

## 2021-08-23 ASSESSMENT — PAIN SCALES - GENERAL: PAINLEVEL: NO PAIN (0)

## 2021-08-23 NOTE — PATIENT INSTRUCTIONS
**For crisis resources, please see the information at the end of this document**     Patient Education      Thank you for coming to the [facility] [place] [facility].    Lab Testing:  If you had lab testing today and your results are reassuring or normal they will be mailed to you or sent through Owlr within 7 days. If the lab tests need quick action we will call you with the results. The phone number we will call with results is # [phone] (home) . If this is not the best number please call our clinic and change the number.    Medication Refills:  If you need any refills please call your pharmacy and they will contact us. Our fax number for refills is [phone]. Please allow three business for refill processing. If you need to  your refill at a new pharmacy, please contact the new pharmacy directly. The new pharmacy will help you get your medications transferred.     Scheduling:  If you have any concerns about today's visit or wish to schedule another appointment please call our office during normal business hours [phone] (8-5:00 M-F)    Contact Us:  Please call [phone] during business hours (8-5:00 M-F).  If after clinic hours, or on the weekend, please call  [phone].    Financial Assistance [phone]  [facility] Billing [phone]  Central Billing Office, [facility]: [phone]  [facility] Billing [phone]  Medical Records [phone]  [facility] Patient Bill of Rights [email or web address]       MENTAL HEALTH CRISIS NUMBERS:  For a medical emergency please call  911 or go to the nearest ER.     [facility]:   [facility] -[phone]   Crisis Residence [facility] Residence -[phone]   Walk-In Counseling Center [facility] -[phone]   COPE 24/7 [place] Mobile Team -[phone] (adults)/[phone] (child)  CHILD: [facility] needs assessment  team - [phone]      [facility]:   [facility] - [phone]   Walk-in counseling [facility] - [phone]   Walk-in counseling [facility] - [phone]   Crisis Residence [facility] Residence - [phone]  Urgent Care Adult Mental Olwkfz-[phone] mobile unit/ 24/7 crisis line    National Crisis Numbers:   National Suicide Prevention Lifeline: 4-431-381-TALK (472-662-5129)  Poison Control Center - 7-803-087-9375  [employer]/resources for a list of additional resources (SOS)  Trans Lifeline a hotline for transgender people 7-974-558-1245  The [employer] a hotline for LGBT youth 9-203-073-6255  Crisis Text Line: For any crisis 24/7   To: 573974  see www.crisistextline.org  - IF MAKING A CALL FEELS TOO HARD, send a text!         Again thank you for choosing [facility] MENTAL HEALTH & ADDICTION [facility] and please let us know how we can best partner with you to improve you and your family's health.    You may be receiving a survey regarding this appointment. We would love to have your feedback, both positive and negative. The survey is done by an external company, so your answers are anonymous.         Treatment Plan Today:     1) Medications-start cariprazine 1.5 mg nightly, change lithium to 600 mg nightly, continue other medications as prescribed    2) Follow-up appt with Dr Petty 4 weeks    3) Crisis numbers are below and clinic after hours number is [phone]

## 2021-08-23 NOTE — PROGRESS NOTES
"VIDEO VISIT  Lux Bailey is a 25 year old patient who is being evaluated via a billable video visit.      The patient has been notified of following:   \"We have found that certain health care needs can be provided without the need for an in-person physical exam. This service lets us provide the care you need with a video conversation. If a prescription is necessary we can send it directly to your pharmacy. If lab work is needed we can place an order for that and you can then stop by our lab to have the test done at a later time. Video visits are billed at different rates depending on your insurance coverage. Please reach out to your insurance provider with any questions. If during the course of the call it appears that a video visit is not appropriate, you will not be charged for this service.\"    Patient has given verbal consent for video visit?: Yes   How would you like to obtain your AVS?: Hangtime  AVS SmartPhrase [PsychAVS] has been placed in 'Patient Instructions': Yes      Video- Visit Details  Type of service:  video visit for medication management  Time of service:    Date:  08/23/2021    Video Start Time:  2:45 pm      Video End Time:  3:27 pm    Reason for video visit:  Patient has requested telehealth visit  Originating Site (patient location):  [facility]   Location- Patient's home  Distant Site (provider location):  [facility]  Mode of Communication:  Video Conference via AmWell  Consent:  Patient has given verbal consent for video visit?: Yes     Video visit was attempted and no video feed was available through and well.  Visit was completed using and well audio only and later over telephone.       [facility]  Psychiatry Clinic  Psychiatric Progress Note     CARE TEAM:  PCP- Physician No Ref-Primary, Psychotherapist- None,     Lux Bailey is a 25 year old who prefers the name Will and uses pronouns he, him, himself.      DIAGNOSIS   Bipolar " Disorder type 1, most recent episode manic, severe, with psychosis, in full remission  ADHD, inattentive type  Obessive compulsive symptoms, r/o OCD     ASSESSMENT   Today: Regino reports significant side effects when lithium was increased including severe lethargy and memory impairment.  He subsequently returned to his prior dose of 600 mg daily without reported adverse effects.  He states he has experienced low motivation levels after discontinuing venlafaxine though suicidal ideation, emotional blunting, energy levels and libido have all improved after stopping venlafaxine.  Today he notes ongoing severe low motivation, low energy, depressed mood and feeling overwhelmed.  Regino expresses interest in trialing another medication for his depression. Will noted previous trials of aripiprazole, risperidone, ziprasidone, and olanzapine with limited benefit or significant side effects.   Given Regino's history of depressed mood, low motivation, feelings of hopelessness, intolerance of venlafaxine due to side effects, comorbid bipolar disorder and multiple trials of other antidepressant medications a trial of cariprazine is indicated.  With Regino's history of sensitivity to side effects starting at 1.5 mg of cariprazine is appropriate. Given Regino's intolerance of increased dose of lithium as well as his history of mood stability with subtherapeutic lithium blood levels continuing his current dose of lithium is appropriate.    Future considerations: May need to increase cariprazine to 3 mg for full antimanic benefits.  Consider trazodone or mirtazapine as alternative antidepressant medication.  Consider initiation of PDE 5 inhibitor if erectile dysfunction is a concern.    MNPMP was checked today:  Indicates taking controlled medication as prescribed.     PLAN                                                                                                                1) Meds-  - Decrease lithium to 600 mg p.o. at bedtime (as  "he has self-adjusted dose to prior to this session)  - Continue Concerta 36 mg PO qam  - Start cariprazine 1.5 mg nightly    Non prescribed medications:  - magnesium supplement and daily multivitamin qhs    2) Psychotherapy- Interested in therapy for ADHD and eating disorder history. Referral placed.     3) Next due-  Labs-Baseline labs due 6/2022, lithium labs remain pending.  Patient advised to get a repeat lithium level when available  EKG- As needed  Rating scales-PHQ 9 due at next in person visit    4) Referrals-  [place] [facility] referral placed today    5) Dispo- Return to clinic in 4 weeks.        PERTINENT BACKGROUND                           [most recent eval 07/23/21]   Previous diagnoses include depression, schizophrenia 2016 when starting college and ADHD 2017. Was initially diagnosed with schizophrenia and placed on antipsychotic for 2 years. In 2018 diagnosis changed from schizophrenia to bipolar disorder and placed on lamotrigine. Lamotrigine was not effective and transitioned to lithium at end of 2018. Mood has been stable with no manic episodes since starting lithium. Mood symptoms have been well managed at subtherapeutic lithium levels. He recalls he grew up in a family where \"mental illness wasn't real\" but struggled with depression starting in 1st or 2nd grade, \"regularly had teachers pull me aside and ask if everything was OK at home.\" He reports a history of chronic suicidal ideation since 3rd grade, particularly when depressed. He has never \"actually attempted it.\" Has also had intrusive thoughts \"all the time\" about hurting or killing himself, despite not wanting to self-harm or having ever done it.      Episodes began to get worse and worse and he \"played the 'things are going to get better when I get to the next stage of life' game.\" He first started medications at the beginning of college, when he had his first manic episode, likely triggered by many factors including lack of " "sleep , along with stress. Has not had a manic episode since he started lithium about 2018/2019.  In college he got care through the university clinic. Then he was working with [facility] for some time, and had a psychiatrist that advised him to immediately go off all his mood stabilizers, which made him concerned and he wanted a second opinion.    Psych pertinent item history includes suicidal ideation, mutiple psychotropic trials , psych hosp (x1, but several psych ER visits) and substance use: alcohol, cannabis and hallucinogens     SUBJECTIVE   Since the last visit: Regino reports increasing lithium to 750 mg daily resulted in a significant increase in lethargy.  While at 900 mg daily he describes experience as \"felt like a complete zombie\" with significantly worsening lethargy, difficulty getting out of bed and significant impairment with memory and concentration.  Regino states he tried remaining on a 900 mg dose for 4 days then he changed his dose back to 600 mg daily and symptoms of lethargy and memory impairment resolved after approximately 3 days.  Regarding Concerta he reports \"I would not be able to get anything done without it\" though states he continues to have difficulty with attention and memory.  Regino reports when he was on venlafaxine he experienced worsening suicidal ideation, severe emotional blunting, low energy, low libido and believes this contributed to breaking up with his girlfriend.  After discontinuing venlafaxine he reports significant difficulties with motivation and keeping a schedule and describes himself as having \"an incredible lack of motivation\".  He notes not working out or keeping himself organized as he usually does which is impairing his job performance.  Regino endorses sleeping approximately 6 to 7 hours per night and feels rested upon waking.    RECENT PSYCH ROS:   Depression:  depressed mood, anhedonia, low energy, poor concentration /memory, feeling worthless, excessive guilt, feeling " "hopeless, feeling trapped, overwhelmed and mood dysregulation  Elevated:  none  Psychosis:  none  Anxiety:  excessive worry, social anxiety and nervous/overwhelmed  Trauma Related:  none  Sleep: no  Other: N/A    Adverse Effects: none  Pertinent Negative Symptoms: No violent ideation, psychosis, hallucinations or maria m, hypomania  Recent Substance Use:     Alcohol- none  Tobacco- none  Caffeine- none  Cannabis- none     Opioids- none           Narcan Kit- n/a   Other illicit drugs- none, no use in 2 years    PSYCH and SUBSTANCE USE Critical Summary Points since July 2021 6/18/21: Stopped effecxor on own  7/26/21: Lithium increased to 750 mg for 2 weeks then to 900 mg if well-tolerated.  8/23/21: Lithium return to 600 mg daily.  Cariprazine 1.5 mg initiated     Medication Max Dose (mg) Dates / Duration Helpful? DC Reason / Adverse Effects?   lamotrigine    Lack of efficacy   lithium 900 current Y [place] \"on edge\" at higher doses (900), severe lethargy and memory impairment at doses above 600 mg   bupropion    Maria M, while on lithium   escitalopram    Maria M, while on lithium, OCD   atomoxetine   N Hot flashes, 1 month trial   Adderall IR    \"I hated\"   lisdexamfetamine    Lack of efficacy   dexadrine       lurasidone    Somnolence, cognitive slowing   methylphenidate 36 current     venlafaxine  150  1375-9980 Y  initially helpful, discontinued due to affect of flattening and emotional blunting.   cariprazine  1.5  2021 to current     risperidone    Cognitive slowing   aripirazole    Tremors, cognitive slowing   ziprazidone    Cognitive slowing   olanzapine    Cognitive slowing      SUBSTANCE USE HISTORY     Past Use- Numerous drugs in college, former cannabis use. LSD, ketamine, cannabis, DMT, psylocybin, research chemicals. No opioids or benzodiazapines  Treatment- #, most recent- no  Medical Consequences- no  Legal Consequences- no  Other- N/A     MEDICAL HISTORY and ALLERGY     ALLERGIES: Patient has no known " "allergies.    There is no problem list on file for this patient.       MEDICAL REVIEW OF SYSTEMS   Contraception- vasectomy    A comprehensive review of systems was performed and is negative other than noted in the HPI.     MEDICATIONS     Current Outpatient Medications   Medication Sig Dispense Refill     lithium ER (LITHOBID) 300 MG CR tablet Take 2 and a half tablets (750 mg) at bedtime for 2 weeks. Then increase to 3 tablets (900 mg) at bedtime if previous dose was well tolerated. 90 tablet 1     methylphenidate (CONCERTA) 36 MG CR tablet Take 1 tablet (36 mg) by mouth every morning 30 tablet 0      VITALS   There were no vitals taken for this visit.    MENTAL STATUS EXAM   Alertness: alert  and oriented  Appearance: adequately groomed  Behavior/Demeanor: cooperative, with good  eye contact   Speech: regular rate and rhythm  Language: no problems  Psychomotor: Unable to assess due to telephone visit  Mood: \"scattered\"  Affect: full range; congruent to: mood- no, content- no  Thought Process/Associations: unremarkable  Thought Content:  Reports none;  Denies suicidal & violent ideation and delusions  Perception:  Reports none;  Denies hallucinations  Insight: good  Judgment: good  Cognition: does  appear grossly intact; formal cognitive testing was not done  oriented: time, person, and place  attention span: intact  concentration: intact  recent memory: intact  remote memory: intact  fund of knowledge: appropriate  Gait and Station: N/A ([facility])     LABS and DATA     PHQ 5/14/2021 6/18/2021   PHQ-9 Total Score 21 15   Q9: Thoughts of better off dead/self-harm past 2 weeks Nearly every day More than half the days   F/U: Thoughts of suicide or self-harm Yes Yes   F/U: Self harm-plan No No   F/U: Self-harm action No No   F/U: Safety concerns No No       Recent Labs   Lab Test 06/02/21  0827   CR 1.14   GFRESTIMATED 90     Recent Labs   Lab Test 06/02/21  0827   LITHIUM 0.42*     Recent Labs   Lab Test " 06/02/21 0827   CR 1.14   GFRESTIMATED 90      POTASSIUM 4.1   JODI 9.2     Recent Labs   Lab Test 06/02/21 0827   SG 1.025     Recent Labs   Lab Test 06/02/21 0827   TSH 0.98     Recent Labs   Lab Test 06/02/21 0827   GLC 91     No lab results found.  Recent Labs   Lab Test 06/02/21 0827   AST 28   ALT 49   ALKPHOS 90     Recent Labs   Lab Test 06/02/21 0827   WBC 5.6   ANEU 3.2   HGB 17.2          ECG: None on file     PSYCHOTROPIC DRUG INTERACTIONS     No significant drug interactions    MANAGEMENT:  Monitoring for adverse effects, routine vitals and routine labs     RISK STATEMENT for SAFETY     Lux Bailey did not appear to be an imminent safety risk to self or others.    TREATMENT RISK STATEMENT: The risks, benefits, alternatives and potential adverse effects have been discussed and are understood by the pt. The pt understands the risks of using street drugs or alcohol. There are no medical contraindications, the pt agrees to treatment with the ability to do so. The pt knows to call the clinic for any problems or to access emergency care if needed.  Medical and substance use concerns are documented above.  Psychotropic drug interaction check was done, including changes made today.     PROVIDER: Lupillo Petty MD    Patient staffed with Dr. Lozano  who will sign the note.  Supervisor is Dr. Lozano.      TELEHEALTH ATTENDING ATTESTATION  Following the ACGME guidelines on telemedicine and direct supervision due to COVID-19, I was concurrently participating in and/or monitoring the patient care through appropriate telecommunication technology.  I discussed the key portions of the service with the resident, including the mental status examination and developing the plan of care. I reviewed key portions of the history with the resident. I agree with the findings and plan as documented in this note.   Genaro Lozano MD    Level of Medical Decision Making:   Problems addressed: - At least 2  stable chronic problems  Amount / complexity of data reviewed / analyzed: Discussion of management or test interpretation with external physician/other qualified healthcare professional/appropriate source  - Moderate due to: Engaged in prescription drug management during visit (discussed any medication benefits, side effects, alternatives, etc.)

## 2021-08-23 NOTE — PROGRESS NOTES
"VIDEO VISIT  Lux Bailey is a 25 year old patient who is being evaluated via a billable video visit.      The patient has been notified of following:   \"This video visit will be conducted via a call between you and your physician/provider. We have found that certain health care needs can be provided without the need for an in-person physical exam. This service lets us provide the care you need with a video conversation. If a prescription is necessary we can send it directly to your pharmacy. If lab work is needed we can place an order for that and you can then stop by our lab to have the test done at a later time. Insurers are generally covering virtual visits as they would in-office visits so billing should not be different than normal.  If for some reason you do get billed incorrectly, you should contact the billing office to correct it and that number is in the AVS .    Video Conference to be completed via:  Masha    Patient has given verbal consent for video visit?:  Yes    Patient would prefer that any video invitations be sent by: Send to e-mail at: [email or web address]      How would patient like to obtain AVS?:  Alvarez    AVS SmartPhrase [PsychAVS] has been placed in 'Patient Instructions':  Yes    "

## 2021-08-24 RX ORDER — METHYLPHENIDATE HYDROCHLORIDE 36 MG/1
36 TABLET ORAL EVERY MORNING
Qty: 30 TABLET | Refills: 0 | Status: SHIPPED | OUTPATIENT
Start: 2021-09-23 | End: 2021-09-22

## 2021-08-24 RX ORDER — METHYLPHENIDATE HYDROCHLORIDE 36 MG/1
36 TABLET ORAL EVERY MORNING
Qty: 30 TABLET | Refills: 0 | Status: SHIPPED | OUTPATIENT
Start: 2021-08-24 | End: 2021-09-21

## 2021-08-26 RX ORDER — LITHIUM CARBONATE 300 MG/1
TABLET, FILM COATED, EXTENDED RELEASE ORAL
Qty: 180 TABLET | OUTPATIENT
Start: 2021-08-26

## 2021-09-21 DIAGNOSIS — F90.0 ATTENTION DEFICIT HYPERACTIVITY DISORDER (ADHD), PREDOMINANTLY INATTENTIVE TYPE: ICD-10-CM

## 2021-09-21 DIAGNOSIS — F31.70 BIPOLAR I DISORDER IN REMISSION (H): ICD-10-CM

## 2021-09-21 RX ORDER — METHYLPHENIDATE HYDROCHLORIDE 36 MG/1
36 TABLET ORAL EVERY MORNING
Qty: 30 TABLET | Refills: 0 | Status: SHIPPED | OUTPATIENT
Start: 2021-09-21 | End: 2021-09-22

## 2021-09-21 NOTE — TELEPHONE ENCOUNTER
Rx refill note    Received rx refill requests for cariprazine 1.5mg and methylphenidate CR 36mg (Concerta).  Reviewed attached notes (9/21/2021 Refill notes) and most recent psychiatry progress note by Christopher Petty and Blake of 8/23/2021. [place]  data appreciated. Mr. Bailey is currently in [place] due to family emergency - thus will order rxs via pharmacy in [place].    I will sign the one month prescription supplies.    He has follow-up psychiatry appointment scheduled for 10/4/2021 here.    Genaro Lozano MD

## 2021-09-21 NOTE — TELEPHONE ENCOUNTER
Writer received call from pt, who identified that there was an insurance issue with the [facility] pharmacy and requested the med orders be sent to a different pharmacy.   Writer pended orders, routed to provider.  Writer prompted pt to contact [facility] pharmacy and cancel the orders sent earlier.

## 2021-09-21 NOTE — TELEPHONE ENCOUNTER
Last seen: 8/23  RTC: 4 weeks  Non-provider cancel: None  No-show: None  Next appt: 10/4     Incoming refill from patient via telephone     Medication requested:   Disp Refills Start End KD   cariprazine (VRAYLAR) 1.5 MG CAPS capsule 30 capsule 1 8/23/2021 10/22/2021 --   Sig - Route: Take 1 capsule (1.5 mg) by mouth At Bedtime      Disp Refills Start End KD   methylphenidate (CONCERTA) 36 MG CR tablet 30 tablet 0 9/23/2021 10/23/2021 --   Sig - Route: Take 1 tablet (36 mg) by mouth every morning   Last refilled: 8/24, 7/21, 6/18 per [place]      Writer called pt and confirmed that he does not need a lithium refill.    Medication refill approved per refill protocol.  Routed to provider to sign and send.

## 2021-09-21 NOTE — TELEPHONE ENCOUNTER
----- Message from Manuel Barber RN sent at 9/21/2021 10:47 AM CDT -----  Regarding: FW: Rx Refill - Jovanny  Contact: [phone]    ----- Message -----  From: Sulema Prakash  Sent: 9/21/2021  10:38 AM CDT  To: [facility]  Subject: Rx Refill - Jovanny                              Caller:  Regino Englishuckey  Relationship to pt: self  Medication:   cariprazine (VRAYLAR) 1.5 MG CAPS capsule & methylphenidate (CONCERTA) 36 MG CR tablet  How many days left of med do you have left (if >3 day supply, redirect to pharmacy): 2  Pharmacy and location: [facility] [address] [place] [address]  Phone: [phone]  Pending appt date:  10/04/21  Okay to leave detailed VM:  yes      ##Pt had family emergency and had to travel to [place] where he needs med refills. VIRAJ Petty scheduled upon his return to [place].

## 2021-09-22 PROCEDURE — 99207 PR NON-BILLABLE SERV PER CHARTING: CPT | Performed by: STUDENT IN AN ORGANIZED HEALTH CARE EDUCATION/TRAINING PROGRAM

## 2021-09-22 RX ORDER — METHYLPHENIDATE HYDROCHLORIDE 36 MG/1
36 TABLET ORAL EVERY MORNING
Qty: 30 TABLET | Refills: 0 | Status: SHIPPED | OUTPATIENT
Start: 2021-09-23 | End: 2021-10-04

## 2021-09-22 RX ORDER — LITHIUM CARBONATE 300 MG/1
600 TABLET, FILM COATED, EXTENDED RELEASE ORAL AT BEDTIME
Qty: 60 TABLET | Refills: 1
Start: 2021-09-22 | End: 2021-10-04

## 2021-09-22 RX ORDER — METHYLPHENIDATE HYDROCHLORIDE 36 MG/1
36 TABLET ORAL EVERY MORNING
Qty: 30 TABLET | Refills: 0 | OUTPATIENT
Start: 2021-09-22

## 2021-09-22 NOTE — TELEPHONE ENCOUNTER
Lupillo Petty MD Snyder, David J RN  Caller: Unspecified (Yesterday, 11:00 AM)  Dr. Blake Reyes had me go back and delete his old Rx's, it think that deleted current medications too. Yes please re-add and resend the concerta and vryalar to Dr. Lozano. Dr. Lozano agreed to sign the new Rx for [facility]. Thanks     Lupillo         =========================================      Re-pended meds, routed to Dr. Lozano.

## 2021-09-22 NOTE — TELEPHONE ENCOUNTER
Refill request documentation:     The patient called nurse partner Manuel Gomez RN for a refill request of cariprazine 1.5 mg at bedtime qty:30 and methylphenidate 36 mg qam qty:30. Patient reported he traveled to [facility] [place] due to a family emergency and requires refills on these medications. Initially the refill request was sent to [facility] [id number] [id number] (phone: [phone]) and Dr. Genaro Lozano signed the prescription orders. Subsequently the patient contacted Manuel Gomez again stating his refills were denied at this pharmacy due to insurance issues and requested the same prescription be sent to [facility] pharmacy #[id number]. This writer contacted the [facility] pharmacy which verified they were not contracted under the patient's care plan and recommended a [facility] pharmacy to fill. [facility] pharmacy staff verified these prescriptions were not filled and cancelled Mr. Bailey's prescription orders.The new pended prescription request to [facility] pharmacy #[id number] was forwarded to Dr. Lozano for signature. Will is scheduled for a follow up appointment with me on 10/4/21.     Lupillo Petty MD

## 2021-09-23 NOTE — TELEPHONE ENCOUNTER
Received communications from Dr. Petty with request to e-prescribe Mr. Bailey's cariprazine and methylphenidate again, as the [facility] in [place], [place] wasn't able to fill these medications with his insurance plan.  Was advised that medications should be ordered via a [facility] pharmacy in [place], [place].    Dr. Petty advised me that he called the [facility] pharmacy and confirmed Mr. Bailey's report, as well as confirming that [facility] discontinued those prescriptions.    I signed the replacement orders for these medications e-prescribed to [facility] in [place], [place].    Of note, Dr. Petty accidentally cancelled Mr. Bailey's lithium ER order in the Meds tab - that was re-ordered (no print out) for documentation purposes.    Genaro Lozano MD

## 2021-10-04 ENCOUNTER — VIRTUAL VISIT (OUTPATIENT)
Dept: PSYCHIATRY | Facility: CLINIC | Age: 26
End: 2021-10-04
Attending: PSYCHIATRY & NEUROLOGY
Payer: COMMERCIAL

## 2021-10-04 DIAGNOSIS — F31.70 BIPOLAR I DISORDER IN REMISSION (H): Primary | ICD-10-CM

## 2021-10-04 DIAGNOSIS — F90.0 ATTENTION DEFICIT HYPERACTIVITY DISORDER (ADHD), PREDOMINANTLY INATTENTIVE TYPE: ICD-10-CM

## 2021-10-04 PROCEDURE — 99214 OFFICE O/P EST MOD 30 MIN: CPT | Mod: GT | Performed by: STUDENT IN AN ORGANIZED HEALTH CARE EDUCATION/TRAINING PROGRAM

## 2021-10-04 RX ORDER — METHYLPHENIDATE HYDROCHLORIDE 54 MG/1
54 TABLET ORAL EVERY MORNING
Qty: 30 TABLET | Refills: 0 | Status: SHIPPED | OUTPATIENT
Start: 2021-10-04 | End: 2021-11-03

## 2021-10-04 RX ORDER — METHYLPHENIDATE HYDROCHLORIDE 54 MG/1
54 TABLET ORAL EVERY MORNING
Qty: 30 TABLET | Refills: 0 | Status: SHIPPED | OUTPATIENT
Start: 2021-11-03 | End: 2021-11-29 | Stop reason: ALTCHOICE

## 2021-10-04 RX ORDER — METHYLPHENIDATE HYDROCHLORIDE 54 MG/1
54 TABLET ORAL EVERY MORNING
Qty: 30 TABLET | Refills: 0 | Status: SHIPPED | OUTPATIENT
Start: 2021-12-03 | End: 2021-11-29 | Stop reason: ALTCHOICE

## 2021-10-04 ASSESSMENT — PAIN SCALES - GENERAL: PAINLEVEL: NO PAIN (0)

## 2021-10-04 NOTE — PROGRESS NOTES
"VIDEO VISIT  Lux Bailey is a 25 year old patient who is being evaluated via a billable video visit.      The patient has been notified of following:   \"This video visit will be conducted via a call between you and your physician/provider. We have found that certain health care needs can be provided without the need for an in-person physical exam. This service lets us provide the care you need with a video conversation. If a prescription is necessary we can send it directly to your pharmacy. If lab work is needed we can place an order for that and you can then stop by our lab to have the test done at a later time. Insurers are generally covering virtual visits as they would in-office visits so billing should not be different than normal.  If for some reason you do get billed incorrectly, you should contact the billing office to correct it and that number is in the AVS .    Video Conference to be completed via:  Masha    Patient has given verbal consent for video visit?:  Yes    Patient would prefer that any video invitations be sent by: Patient is using twidox to log on and declined a link for this visit.        How would patient like to obtain AVS?:  twidox    AVS SmartPhrase [PsychAVS] has been placed in 'Patient Instructions':  Yes    "

## 2021-10-04 NOTE — PATIENT INSTRUCTIONS
**For crisis resources, please see the information at the end of this document**     Patient Education      Thank you for coming to the [facility] MENTAL HEALTH & ADDICTION [place] CLINIC.    Lab Testing:  If you had lab testing today and your results are reassuring or normal they will be mailed to you or sent through [facility] [employer] within 7 days. If the lab tests need quick action we will call you with the results. The phone number we will call with results is # [phone] (home) . If this is not the best number please call our clinic and change the number.    Medication Refills:  If you need any refills please call your pharmacy and they will contact us. Our fax number for refills is [phone]. Please allow three business for refill processing. If you need to  your refill at a new pharmacy, please contact the new pharmacy directly. The new pharmacy will help you get your medications transferred.     Scheduling:  If you have any concerns about today's visit or wish to schedule another appointment please call our office during normal business hours [phone] (8-5:00 M-F)    Contact Us:  Please call [phone] during business hours (8-5:00 M-F).  If after clinic hours, or on the weekend, please call  [phone].    Financial Assistance [phone]  [facility] Billing [phone]  Central Billing Office, [facility]: [phone]  [facility] Billing [phone]  Medical Records [phone]  [facility] Patient Bill of Rights [email or web address]       MENTAL HEALTH CRISIS NUMBERS:  For a medical emergency please call  911 or go to the nearest ER.     [facility]:   [facility] -[phone]   Crisis Residence [facility] -[phone]   Walk-In Counseling Center \[facility]\"" -[phone]   COPE 24/7 [place] Mobile Team -[phone] (adults)/[phone] (child)  CHILD: [facility] needs assessment  team - [phone]      [facility]:   [facility] - [phone]   Walk-in counseling [facility] - [phone]   Walk-in counseling [facility] - [phone]   Crisis Residence [facility] - [phone]  Urgent Care Adult Mental [place]-[phone] mobile unit/ 24/7 crisis line    National Crisis Numbers:   National Suicide Prevention Lifeline: 6-664-830-TALK (782-389-9281)  Poison Control Center - 6-710-202-5391  invi/resources for a list of additional resources (SOS)  Trans Lifeline a hotline for transgender people 9-252-509-9574  [employer] a hotline for LGBT youth 7-084-470-9690  Crisis Text Line: For any crisis 24/7   To: 559946  see www.crisistextline.org  - IF MAKING A CALL FEELS TOO HARD, send a text!         Again thank you for choosing [facility] MENTAL HEALTH & ADDICTION [facility] and please let us know how we can best partner with you to improve you and your family's health.    You may be receiving a survey regarding this appointment. We would love to have your feedback, both positive and negative. The survey is done by an external company, so your answers are anonymous.         Treatment Plan Today:     1) Medications-increase Concerta to 54 mg daily every morning.  Continue other medications as prescribed    2) Follow-up appt with Dr Petty 8 weeks    3) Crisis numbers are below and clinic after hours number is [phone]

## 2021-10-04 NOTE — PROGRESS NOTES
"VIDEO VISIT  Lux Bailey is a 25 year old patient who is being evaluated via a billable video visit.      The patient has been notified of following:   \"We have found that certain health care needs can be provided without the need for an in-person physical exam. This service lets us provide the care you need with a video conversation. If a prescription is necessary we can send it directly to your pharmacy. If lab work is needed we can place an order for that and you can then stop by our lab to have the test done at a later time. Video visits are billed at different rates depending on your insurance coverage. Please reach out to your insurance provider with any questions. If during the course of the call it appears that a video visit is not appropriate, you will not be charged for this service.\"    Patient has given verbal consent for video visit?: Yes   How would you like to obtain your AVS?: WorkTouch  AVS SmartPhrase [PsychAVS] has been placed in 'Patient Instructions': Yes      Video- Visit Details  Type of service:  video visit for medication management  Time of service:    Date:  10/04/2021    Video Start Time:  8:45 am      Video End Time:  9:05 am    Reason for video visit:  Patient has requested telehealth visit  Originating Site (patient location):  [facility]   Location- Patient's home  Distant Site (provider location):  [facility]  Mode of Communication:  [facility] Conference [facility]   Consent:  Patient has given verbal consent for video visit?: Yes     Video visit was attempted and no video feed was available through and well.  Visit was completed using and well audio only and later over telephone.       [facility]  Psychiatry Clinic  Psychiatric Progress Note     CARE TEAM:  PCP- Physician No Ref-Primary, Psychotherapist- None,     Lux Bailey is a 25 year old who prefers the name Will and uses pronouns he, him, himself.      DIAGNOSIS   Bipolar " Disorder type 1, most recent episode manic, severe, with psychosis, in full remission  ADHD, inattentive type  Obessive compulsive symptoms, r/o OCD     ASSESSMENT   Today: Regino reports significant improvements in depressed mood after discontinuing lithium and starting cariprazine.  He reports stopping lithium completely on 8/23/2021 and noted initial insomnia which later resolved and no other significant adverse effects.  He endorses improved energy levels, improved sleep, improved depressive symptoms, improvement in mood swings, improvement with memory and concentration and improvement in erectile dysfunction after this medication change.  Will states he sleeps throughout the night 7 to 8 hours consistently. He notes weight gain of 12 pounds over the last month and denies changes and appetite or food intake.  He notes increased psychosocial stressors including staying in [place] for a week to help his mother and increased financial strain.  During his time in [place] he had not been exercising but plans to resume his exercise regimen now that he is back in [place].  He notes cariprazine is considerably expensive though denies current financial difficulty in affording his medication.  Regino reports Concerta was initially helpful for his ADHD symptoms though appears to be wearing off by the afternoon as evidenced by difficulty in focus and attention, and is interested in a dose increase.  Given Regino's improved depressive symptoms, consistent adequate sleep, absence of manic symptoms, report of reduced Concerta efficacy and afternoons and desire to increase his dose, increasing Concerta to 54 mg daily is indicated.  Patient was advised to contact care team if any significant sleep disruptions or manic symptoms develop after changing this dose.    Future considerations: May need to increase cariprazine to 3 to 6 mg for full antimanic benefits.  Consider trazodone or mirtazapine as alternative antidepressant  "medication.  Consider initiation of PDE 5 inhibitor if erectile dysfunction is a concern again.    MNPMP was checked today:  Indicates taking controlled medication as prescribed.     PLAN                                                                                                                1) Meds-  - Discontinue lithium (Will reported stopping lithium on 8/23/2021)  - Increase Concerta from 36 to 54 mg PO qam  - Continue cariprazine 1.5 mg nightly    Non prescribed medications:  - magnesium supplement and daily multivitamin qhs    2) Psychotherapy- Interested in therapy for ADHD and eating disorder history. Referral placed.     3) Next due-  Labs- atypical neuroleptic monitoring labs due 6/2022, lithium labs remain pending.  Patient advised to get a repeat lithium level when available  EKG- As needed  Rating scales-PHQ 9 due at next in person visit    4) Referrals-  [facility] referral placed today    5) Dispo- Return to clinic in 8 weeks.        PERTINENT BACKGROUND                           [most recent eval 07/23/21]   Previous diagnoses include depression, schizophrenia 2016 when starting college and ADHD 2017. Was initially diagnosed with schizophrenia and placed on antipsychotic for 2 years. In 2018 diagnosis changed from schizophrenia to bipolar disorder and placed on lamotrigine. Lamotrigine was not effective and transitioned to lithium at end of 2018. Mood has been stable with no manic episodes since starting lithium. Mood symptoms have been well managed at subtherapeutic lithium levels. He recalls he grew up in a family where \"mental illness wasn't real\" but struggled with depression starting in 1st or 2nd grade, \"regularly had teachers pull me aside and ask if everything was OK at home.\" He reports a history of chronic suicidal ideation since 3rd grade, particularly when depressed. He has never \"actually attempted it.\" Has also had intrusive thoughts \"all the time\" about hurting " "or killing himself, despite not wanting to self-harm or having ever done it. Episodes began to get worse and worse and he \"played the 'things are going to get better when I get to the next stage of life' game.\" He first started medications at the beginning of college, when he had his first manic episode, likely triggered by many factors including lack of sleep , along with stress. Has not had a manic episode since he started lithium about 2018/2019.  In college he got care through the [facility]. Then he was working with [facility] for some time, and had a psychiatrist that advised him to immediately go off all his mood stabilizers, which made him concerned and he wanted a second opinion. After transferring care to [facility] he subsequently self discontinued venlafaxine due to affective flattening and was transitioned off lithium to cariprazine with good effect.     Psych pertinent item history includes suicidal ideation, mutiple psychotropic trials , psych hosp (x1, but several psych ER visits) and substance use: alcohol, cannabis and hallucinogens     SUBJECTIVE   Since the last visit: Will reports his mood is \"good\" and states Vraylar has been working very well.  He reports having to go to [place] to help take care of his brothers while his mother had to leave and take care of her his grandmother.  He states he discontinued lithium use as soon as he was prescribed Vraylar and notes difficulty sleeping for the first 2 weeks which has since resolved.  He states his mood has been \"stable the whole time\" and reports cariprazine helps \"calm my brain down\".  He does note gaining approximately 12 pounds in the last month despite not noticing increased appetite or intake.  We will states he has not been exercising like usual since visiting [place] though states he went on a run this morning and plans to resume his exercise regimen going forward.  He notes that Concerta while still helpful appears to loosen potency later in " "the day and is interested in a dose increase to aid with focus and concentration.  He describes sleep as \"perfect\", denies nighttime awakenings and reports consistently sleeping 7 to 8 hours per night.  Will describes work as \"fine\", that depression has improved and that he feels more hopeful for the future.  He endorses increased anxiety symptoms attributed to family obligations and money concerns though he states this is a manageable level of anxiety.  He reports improvement in concentration and memory after switching to cariprazine and denies any issues with erectile dysfunction.  Notably he states cariprazine is considerably more expensive than his other medications but at this time it is covered by his insurance and denies any difficulty affording this medication.    RECENT PSYCH ROS:   Depression:  excessive guilt and overwhelmed  Elevated:  none  Psychosis:  none  Anxiety:  excessive worry and nervous/overwhelmed  Trauma Related:  none  Sleep: yes  Other: N/A    Adverse Effects: none  Pertinent Negative Symptoms: No violent ideation, psychosis, hallucinations or maria m, hypomania  Recent Substance Use:     Alcohol- none  Tobacco- none  Caffeine- none  Cannabis- none     Opioids- none           Narcan Kit- n/a   Other illicit drugs- none, no use in 2 years    PSYCH and SUBSTANCE USE Critical Summary Points since July 2021 6/18/21: Stopped effexor on own  7/26/21: Lithium increased to 750 mg for 2 weeks then to 900 mg if well-tolerated.  8/23/21: Lithium return to 600 mg daily.  Cariprazine 1.5 mg initiated  10/4/21: Concerta increased to 54 mg daily.  Past Medication Trials        Medication Max Dose (mg) Dates / Duration Helpful? DC Reason / Adverse Effects?   lamotrigine    Lack of efficacy   lithium 900 current Y [place] \"on edge\" at higher doses (900), severe lethargy and memory impairment at doses above 600 mg   bupropion    Maria M, while on lithium   escitalopram    Maria M, while on lithium, OCD " "  atomoxetine   N Hot flashes, 1 month trial   Adderall IR    \"I hated\"   lisdexamfetamine    Lack of efficacy   dexadrine       lurasidone    Somnolence, cognitive slowing   methylphenidate 36 current     venlafaxine  150  5584-9859 Y  initially helpful, discontinued due to affect of flattening and emotional blunting.   cariprazine  1.5  2021 to current     risperidone    Cognitive slowing   aripirazole    Tremors, cognitive slowing   ziprazidone    Cognitive slowing   olanzapine    Cognitive slowing   cariprazine 1.5 2021-current        SUBSTANCE USE HISTORY     Past Use- Numerous drugs in college, former cannabis use. LSD, ketamine, cannabis, DMT, psylocybin, research chemicals. No opioids or benzodiazapines  Treatment- #, most recent- no  Medical Consequences- no  Legal Consequences- no  Other- N/A     MEDICAL HISTORY and ALLERGY     ALLERGIES: Patient has no known allergies.    There is no problem list on file for this patient.       MEDICAL REVIEW OF SYSTEMS   Contraception- vasectomy    A comprehensive review of systems was performed and is negative other than noted in the HPI.     MEDICATIONS     Current Outpatient Medications   Medication Sig Dispense Refill     cariprazine (VRAYLAR) 1.5 MG CAPS capsule Take 1 capsule (1.5 mg) by mouth At Bedtime 30 capsule 0     lithium ER (LITHOBID) 300 MG CR tablet Take 2 tablets (600 mg) by mouth At Bedtime 60 tablet 1     methylphenidate (CONCERTA) 36 MG CR tablet Take 1 tablet (36 mg) by mouth every morning 30 tablet 0      VITALS   There were no vitals taken for this visit.    MENTAL STATUS EXAM   Alertness: alert  and oriented  Appearance: adequately groomed  Behavior/Demeanor: cooperative and calm, with good  eye contact   Speech: normal and regular rate and rhythm  Language: intact and no problems  Psychomotor: normal or unremarkable  Mood: \"good\"  Affect: full range; congruent to: mood- yes, content- yes  Thought Process/Associations: unremarkable  Thought " Content:  Reports none;  Denies suicidal & violent ideation and delusions  Perception:  Reports none;  Denies hallucinations  Insight: good  Judgment: good  Cognition: does  appear grossly intact; formal cognitive testing was not done  oriented: time, person, and place  attention span: intact  concentration: intact  recent memory: intact  remote memory: intact  fund of knowledge: appropriate  Gait and Station: N/A (telehealth)     LABS and DATA     PHQ 5/14/2021 6/18/2021   PHQ-9 Total Score 21 15   Q9: Thoughts of better off dead/self-harm past 2 weeks Nearly every day More than half the days   F/U: Thoughts of suicide or self-harm Yes Yes   F/U: Self harm-plan No No   F/U: Self-harm action No No   F/U: Safety concerns No No       Recent Labs   Lab Test 06/02/21  0827   CR 1.14   GFRESTIMATED 90     Recent Labs   Lab Test 06/02/21  0827   LITHIUM 0.42*     Recent Labs   Lab Test 06/02/21  0827   CR 1.14   GFRESTIMATED 90      POTASSIUM 4.1   JODI 9.2     Recent Labs   Lab Test 06/02/21  0827   SG 1.025     Recent Labs   Lab Test 06/02/21  0827   TSH 0.98     Recent Labs   Lab Test 06/02/21  0827   GLC 91     No lab results found.  Recent Labs   Lab Test 06/02/21  0827   AST 28   ALT 49   ALKPHOS 90     Recent Labs   Lab Test 06/02/21  0827   WBC 5.6   ANEU 3.2   HGB 17.2          ECG: None on file     PSYCHOTROPIC DRUG INTERACTIONS     No significant drug interactions    MANAGEMENT:  Monitoring for adverse effects, routine vitals and routine labs     RISK STATEMENT for SAFETY   Lux Bailey did not appear to be an imminent safety risk to self or others.    TREATMENT RISK STATEMENT: The risks, benefits, alternatives and potential adverse effects have been discussed and are understood by the pt. The pt understands the risks of using street drugs or alcohol. There are no medical contraindications, the pt agrees to treatment with the ability to do so. The pt knows to call the clinic for any problems or  to access emergency care if needed.  Medical and substance use concerns are documented above.  Psychotropic drug interaction check was done, including changes made today.     PROVIDER: Lupillo Petty MD    Patient not staffed in clinic.  Note will be reviewed and signed by supervisor Dr. Lozano.      Attestation:  I did not see Lux Bailey directly. I have reviewed the documentation and I agree with the resident's plan of care.   Genaro Lozano MD      Level of Medical Decision Making:   Problems addressed: - At least 1 chronic problem that is not stable  Amount / complexity of data reviewed / analyzed: Number of test / lab results reviewed: 3+  - Moderate due to: Engaged in prescription drug management during visit (discussed any medication benefits, side effects, alternatives, etc.)

## 2021-10-11 ENCOUNTER — HEALTH MAINTENANCE LETTER (OUTPATIENT)
Age: 26
End: 2021-10-11

## 2021-11-29 ENCOUNTER — OFFICE VISIT (OUTPATIENT)
Dept: PSYCHIATRY | Facility: CLINIC | Age: 26
End: 2021-11-29
Attending: PSYCHIATRY & NEUROLOGY
Payer: COMMERCIAL

## 2021-11-29 VITALS — HEART RATE: 81 BPM | DIASTOLIC BLOOD PRESSURE: 85 MMHG | SYSTOLIC BLOOD PRESSURE: 125 MMHG | WEIGHT: 236.6 LBS

## 2021-11-29 DIAGNOSIS — F31.70 BIPOLAR I DISORDER IN REMISSION (H): Primary | ICD-10-CM

## 2021-11-29 DIAGNOSIS — F90.0 ATTENTION DEFICIT HYPERACTIVITY DISORDER (ADHD), PREDOMINANTLY INATTENTIVE TYPE: ICD-10-CM

## 2021-11-29 DIAGNOSIS — Z79.899 ENCOUNTER FOR LONG-TERM CURRENT USE OF MEDICATION: ICD-10-CM

## 2021-11-29 PROCEDURE — 99214 OFFICE O/P EST MOD 30 MIN: CPT | Mod: GC | Performed by: STUDENT IN AN ORGANIZED HEALTH CARE EDUCATION/TRAINING PROGRAM

## 2021-11-29 PROCEDURE — G0463 HOSPITAL OUTPT CLINIC VISIT: HCPCS

## 2021-11-29 RX ORDER — METHYLPHENIDATE HYDROCHLORIDE 36 MG/1
36 TABLET ORAL EVERY MORNING
Qty: 30 TABLET | Refills: 0 | Status: SHIPPED | OUTPATIENT
Start: 2021-11-29 | End: 2021-12-29

## 2021-11-29 RX ORDER — LITHIUM CARBONATE 300 MG/1
TABLET, FILM COATED, EXTENDED RELEASE ORAL
Qty: 42 TABLET | Refills: 0 | Status: SHIPPED | OUTPATIENT
Start: 2021-11-29 | End: 2021-12-21

## 2021-11-29 RX ORDER — METHYLPHENIDATE HYDROCHLORIDE 36 MG/1
36 TABLET ORAL EVERY MORNING
Qty: 30 TABLET | Refills: 0 | Status: SHIPPED | OUTPATIENT
Start: 2021-12-30 | End: 2022-01-03

## 2021-11-29 ASSESSMENT — PAIN SCALES - GENERAL: PAINLEVEL: NO PAIN (0)

## 2021-11-29 NOTE — PATIENT INSTRUCTIONS
**For crisis resources, please see the information at the end of this document**     Patient Education      Thank you for coming to the [facility] MENTAL HEALTH & ADDICTION [place] [facility]INIC.    Lab Testing:  If you had lab testing today and your results are reassuring or normal they will be mailed to you or sent through [employer] within 7 days. If the lab tests need quick action we will call you with the results. The phone number we will call with results is # [phone] (home) . If this is not the best number please call our clinic and change the number.    Medication Refills:  If you need any refills please call your pharmacy and they will contact us. Our fax number for refills is [phone]. Please allow three business for refill processing. If you need to  your refill at a new pharmacy, please contact the new pharmacy directly. The new pharmacy will help you get your medications transferred.     Scheduling:  If you have any concerns about today's visit or wish to schedule another appointment please call our office during normal business hours [phone] (8-5:00 M-F)    Contact Us:  Please call [phone] during business hours (8-5:00 M-F).  If after clinic hours, or on the weekend, please call  [phone].    Financial Assistance [phone]  [facility] Billing [phone]  Central Billing Office, [facility]: [phone]  [facility] Billing [phone]  Medical Records [phone]  [facility] Patient Bill of Rights [email or web address]       MENTAL HEALTH CRISIS NUMBERS:  For a medical emergency please call  911 or go to the nearest ER.     [facility]:   [facility] -[phone]   Crisis Residence [facility] Residence -[phone]   Walk-In Counseling Center [facility] -[phone]   COPE 24/7 [place] Mobile Team -[phone] (adults)/[phone] (child)  CHILD: [facility] needs assessment  team - [phone]      [facility]:   [facility] - [phone]   Walk-in counseling [facility] - [phone]   Walk-in counseling [facility] - [phone]   Crisis Residence [facility] Res[facility] - [phone]  Urgent Care Adult Mental [facility]-[phone] mobile unit/ 24/7 crisis line    National Crisis Numbers:   National Suicide Prevention Lifeline: 5-913-412-TALK (207-398-4809)  Poison Control Center - 5-995-587-7912  [employer]/resources for a list of additional resources (SOS)  Trans Lifeline a hotline for transgender people 1-785.535.3203  The [employer] a hotline for LGBT youth 3-505-860-4689  Crisis Text Line: For any crisis 24/7   To: 150834  see www.crisistextline.org  - IF MAKING A CALL FEELS TOO HARD, send a text!         Again thank you for choosing [facility] MENTAL HEALTH & ADDICTION [facility] and please let us know how we can best partner with you to improve you and your family's health.    You may be receiving a survey regarding this appointment. We would love to have your feedback, both positive and negative. The survey is done by an external company, so your answers are anonymous.         Treatment Plan Today:     1) Medications-start lithium 300 mg every morning for 14 days then increase to 300 mg twice a day if well-tolerated.  Reduce Concerta to 36 mg daily.  Continue Vraylar until next appointment.    2) Follow-up appt with Dr Petty in 5 weeks    3) Crisis numbers are below and clinic after hours number is [phone]

## 2021-11-29 NOTE — PROGRESS NOTES
"     [facility] [place]  Psychiatry Clinic  MEDICAL PROGRESS NOTE     CARE TEAM:  PCP- Physician No Ref-Primary, Psychotherapist- None,     Lux Bailey is a 26 year old who prefers the name Regino and uses pronouns he, him, himself.      DIAGNOSIS   Bipolar Disorder type 1, most recent episode manic, severe, with psychosis, in full remission  ADHD, inattentive type   Obessive compulsive symptoms, r/o OCD     ASSESSMENT   Today: Regino reports his mood is \"stable\".  He notes significant side effects from his current medications including approximately 12 pounds of weight gain in the last 6 weeks due to increased appetite attributed to cariprazine and emotional blunting with reduced libido attributed to an increased dose of methylphenidate. Will states it has been harder for him to focus and pay attention at work on the higher dose of methylphenidate and prefers to reduce this dose.  Additionally Will states lithium worked better then cariprazine and is significantly more affordable and desires to resume lithium therapy.  He reports increased psychosocial stressors including his grandmother being in poor health and getting behind at work.  He received a call from [facility] and has deferred pursuing psychotherapy until January of next year due to family and work obligations.  He reports sleeping 7 to 8 hours per night and wakes feeling well rested.  Of note patient reports experiencing chronic nightly nightmares and reports insomnia during the initial 2 weeks of taking cariprazine which has since resolved. the patient denies any other significant health concerns.    Given Regino's history of bipolar 1 disorder, and desire to resume lithium, and significant weight gain from cariprazine, resuming lithium for mood stabilization as indicated.  We agreed to resume lithium at 300 mg every morning for 14 days, then increase to 300 mg twice daily.  Cariprazine will be maintained " reevaluated at next visit.  Given Will's increase and difficulty focusing, emotional blunting and low libido attributed to his higher dose of methylphenidate and his desire to reduce this dose, reducing methylphenidate to 36 mg daily is appropriate.    Future considerations: May need to increase cariprazine to 3 to 6 mg for full antimanic benefits.  Consider trazodone or mirtazapine as alternative antidepressant medication.  Consider initiation of PDE 5 inhibitor if erectile dysfunction is a concern again. Consider VPA and re-evaluate other SGA trials.     MNPMP was checked today:  Indicates taking controlled medication as prescribed.     PLAN                                                                                                                1) Meds-  - Decrease Concerta to 36 mg daily  - Start lithium 300 for 14 days, then increase to 300 mg BID if well tolerated  - Continue cariprazine 1.5 mg nightly    Non prescribed medications:  - magnesium supplement and daily multivitamin qhs    2) Psychotherapy- Interested in therapy for ADHD and eating disorder history. Referral placed on 10/2021. Will consider calling back in January 2022.     3) Next due-  Labs- atypical neuroleptic monitoring labs due 6/2022.  Lithium level pending, ordered on 7/26/2021.  EKG- As needed  Rating scales-PHQ 9 due at next in person visit    4) Referrals-  None     5) Dispo- Return to clinic in 5 weeks.      PERTINENT BACKGROUND                           [most recent eval 07/23/21]   Previous diagnoses include depression, schizophrenia 2016 when starting college and ADHD 2017. Was initially diagnosed with schizophrenia and placed on antipsychotic for 2 years. In 2018 diagnosis changed from schizophrenia to bipolar disorder and placed on lamotrigine. Lamotrigine was not effective and transitioned to lithium at end of 2018. Mood has been stable with no manic episodes since starting lithium. Mood symptoms have been well managed at  "subtherapeutic lithium levels. He recalls he grew up in a family where \"mental illness wasn't real\" but struggled with depression starting in 1st or 2nd grade, \"regularly had teachers pull me aside and ask if everything was OK at home.\" He reports a history of chronic suicidal ideation since 3rd grade, particularly when depressed. He has never \"actually attempted it.\" Has also had intrusive thoughts \"all the time\" about hurting or killing himself, despite not wanting to self-harm or having ever done it. Episodes began to get worse and worse and he \"played the 'things are going to get better when I get to the next stage of life' game.\" He first started medications at the beginning of college, when he had his first manic episode, likely triggered by many factors including lack of sleep , along with stress. Has not had a manic episode since he started lithium about 2018/2019.  In college he [facility]. Then he was working with [facility] for some time, and had a psychiatrist that advised him to immediately go off all his mood stabilizers, which made him concerned and he wanted a second opinion. After transferring care to [facility] he subsequently self discontinued venlafaxine due to affective flattening and was transitioned off lithium to cariprazine with good effect.     Psych pertinent item history includes suicidal ideation, mutiple psychotropic trials , psych hosp (x1, but several psych ER visits) and substance use: alcohol, cannabis and hallucinogens     SUBJECTIVE   Since the last visit:   Mood: \"stable\", feels muted and like a zombie. Last mood episode 4-5 months ago  Sleep: 7-8 hrs per night, feels well rested. Nightly nightmares \"I'm used to it\". 2 weeks of insomnia when initially starting regular  Meds: Vryalar seems to be working. Concerta not working well, still taking it. Worked better at 36 mg. Harder to pay attention at higher doses and muted my emotions and lowered libido. Lithium worked " "better and was cheaper.  Expresses desire to resume lithium.  Stress: \"very high\". Grandmother is in poor health and difficulty focusing at work. Getting less work done than usual.   Health: Gained 12 lbs and increased appetite.   Therapy: Deferred until 1/2022 due to family and work obligations.    RECENT PSYCH ROS:   Depression:  depressed mood, anhedonia, appetite changes, weight changes, poor concentration /memory, excessive guilt and overwhelmed  Elevated:  none  Psychosis:  none  Anxiety:  excessive worry  Trauma Related:  nightmares  Sleep: yes  Other: N/A    Adverse Effects: none   Pertinent Negative Symptoms: No violent ideation, psychosis, hallucinations or maria m, hypomania  Recent Substance Use:     Alcohol- none  Tobacco- none  Caffeine- none  Cannabis- none     Opioids- none           Narcan Kit- n/a   Other illicit drugs- none, no use in 2 years    PSYCH and SUBSTANCE USE Critical Summary Points since July 2021 6/18/21: Stopped effexor on own  7/26/21: Lithium increased to 750 mg for 2 weeks then to 900 mg if well-tolerated.  8/23/21: Lithium return to 600 mg daily.  Cariprazine 1.5 mg initiated  10/4/21: Concerta increased to 54 mg daily. Lithium self-discontinued  11/29/21: Lithium resumed at 300 mg qam for 14 days, then increase to 300 BID. Concerta decreased to 36 mg daily.  Past Medication Trials        Medication Max Dose (mg) Dates / Duration Helpful? DC Reason / Adverse Effects?   lamotrigine    Lack of efficacy   lithium 900 current Y [place] \"on edge\" at higher doses (900), severe lethargy and memory impairment at doses above 600 mg. Despite side effects works better than other mood stabilizer trials.   bupropion    Maria M, while on lithium   escitalopram    Maria M, while on lithium, OCD   atomoxetine   N Hot flashes, 1 month trial   Adderall   5394-6986 Y \"I hated it\" moderate efficacy   lisdexamfetamine   N Lack of efficacy   dexadrine       lurasidone    Somnolence, cognitive slowing " "  methylphenidate 54 current Y Wears off quick, emotional blunting and low libido.    venlafaxine  150  7526-8578 Y  initially helpful, discontinued due to affect of flattening and emotional blunting.   cariprazine  1.5  2021 to current Y    risperidone    Cognitive slowing   aripirazole    Tremors, cognitive slowing   ziprazidone    Cognitive slowing   olanzapine    Cognitive slowing   cariprazine 1.5 2021-current Y Weight gain      MEDICAL HISTORY and ALLERGY     ALLERGIES: Patient has no known allergies.    There is no problem list on file for this patient.       MEDICAL REVIEW OF SYSTEMS   Contraception- vasectomy    A comprehensive review of systems was performed and is negative other than noted in the HPI.     MEDICATIONS     Current Outpatient Medications   Medication Sig Dispense Refill     cariprazine (VRAYLAR) 1.5 MG CAPS capsule Take 1 capsule (1.5 mg) by mouth At Bedtime 30 capsule 2     methylphenidate (CONCERTA) 54 MG CR tablet Take 1 tablet (54 mg) by mouth every morning 30 tablet 0     [START ON 12/3/2021] methylphenidate (CONCERTA) 54 MG CR tablet Take 1 tablet (54 mg) by mouth every morning 30 tablet 0      VITALS   /85   Pulse 81   Wt 107.3 kg (236 lb 9.6 oz)     MENTAL STATUS EXAM   Alertness: alert   Appearance: well groomed  Behavior/Demeanor: cooperative, with fair  eye contact   Speech: normal and regular rate and rhythm  Language: intact and no problems  Psychomotor: normal or unremarkable  Mood: \"stable\"  Affect: restricted; congruent to: mood- yes, content- yes  Thought Process/Associations: unremarkable  Thought Content:  Reports none;  Denies suicidal & violent ideation and delusions  Perception:  Reports none;  Denies hallucinations  Insight: good  Judgment: fair  Cognition: does  appear grossly intact; formal cognitive testing was not done  Gait and Station: unremarkable       LABS and DATA     PHQ 5/14/2021 6/18/2021   PHQ-9 Total Score 21 15   Q9: Thoughts of better off " dead/self-harm past 2 weeks Nearly every day More than half the days   F/U: Thoughts of suicide or self-harm Yes Yes   F/U: Self harm-plan No No   F/U: Self-harm action No No   F/U: Safety concerns No No       Recent Labs   Lab Test 06/02/21  0827   CR 1.14   GFRESTIMATED 90     Recent Labs   Lab Test 06/02/21 0827   LITHIUM 0.42*     Recent Labs   Lab Test 06/02/21 0827   CR 1.14   GFRESTIMATED 90      POTASSIUM 4.1   JODI 9.2     Recent Labs   Lab Test 06/02/21 0827   SG 1.025     Recent Labs   Lab Test 06/02/21 0827   TSH 0.98     Recent Labs   Lab Test 06/02/21 0827   GLC 91     No lab results found.  Recent Labs   Lab Test 06/02/21 0827   AST 28   ALT 49   ALKPHOS 90     Recent Labs   Lab Test 06/02/21 0827   WBC 5.6   ANEU 3.2   HGB 17.2          ECG: None on file     PSYCHOTROPIC DRUG INTERACTIONS     No significant drug interactions    MANAGEMENT:  Monitoring for adverse effects, routine vitals and routine labs     RISK STATEMENT for SAFETY   Lux Bailey did not appear to be an imminent safety risk to self or others.    TREATMENT RISK STATEMENT: The risks, benefits, alternatives and potential adverse effects have been discussed and are understood by the pt. The pt understands the risks of using street drugs or alcohol. There are no medical contraindications, the pt agrees to treatment with the ability to do so. The pt knows to call the clinic for any problems or to access emergency care if needed.  Medical and substance use concerns are documented above.  Psychotropic drug interaction check was done, including changes made today.     PROVIDER: Lupillo Petty MD    Patient staffed in clinic with Dr. Lozano who will sign the note.  Supervisor is Dr. Lozano.      Level of Medical Decision Making:   - At least 1 chronic problem that is not stable{

## 2022-01-02 NOTE — PROGRESS NOTES
"     [facility]  Psychiatry Clinic  MEDICAL PROGRESS NOTE     CARE TEAM:  PCP- Physician No Ref-Primary, Psychotherapist- None,     Lux Bailey is a 26 year old who prefers the name Regino and uses pronouns he, him, himself.      DIAGNOSIS   Bipolar Disorder type 1, most recent episode manic, severe, with psychosis, in full remission  ADHD, inattentive type   Obessive compulsive symptoms, r/o OCD     ASSESSMENT   Today: Reports his mood as \"really good\" and denies symptoms of anxiety or depression.  He reports sleeping 6 to 7 hours of uninterrupted sleep each night and awakes feeling refreshed.  Will states his focus improved at a lower dose of Concerta.  He discontinued cariprazine approximately 2 to 3 weeks ago and states his appetite reduced and libido improved after stopping this medication.  He notes increased dry mouth and thirst after starting lithium and is currently taking 300 mg twice daily.  He denies significant psychosocial stressors and denies health concerns.  He recently started intermittent fasting and is hopeful about future weight loss.  Psychotherapy referral follow-up was discussed and will elected to defer this until March of this year due to other obligations but remains interested.    Given Regino's stable mood, adequate sleep, absence of significant side effects and pending lithium level no medication changes are indicated at this time.  Will agreed to obtain his lithium lab prior to his next visit.    Future considerations: Continue titration of lithium pending lithium level.  Consider trazodone or mirtazapine as alternative antidepressant medication.  Consider initiation of PDE 5 inhibitor if erectile dysfunction is a concern again. Consider VPA and re-evaluate other SGA trials.     MNPMP was checked today:  Indicates taking controlled medication as prescribed.     PLAN                                                                                             " "                   1) Meds-  - Continue Concerta 36 mg daily  - Continue lithium 300 mg BID  - Discontinue cariprazine 1.5 mg nightly (self discontinued by patient)    Non prescribed medications:  - magnesium supplement and daily multivitamin qhs    2) Psychotherapy- Interested in therapy for ADHD and eating disorder history. Referral placed on 10/2021. Will consider calling back in in March 2022.     3) Next due-  Labs- atypical neuroleptic monitoring labs would have been due 6/2022.  Lithium level pending, ordered on 7/26/2021.  EKG- As needed  Rating scales-PHQ 9 due at next in person visit    4) Referrals-  None     5) Dispo- Return to clinic in 8 weeks.      PERTINENT BACKGROUND                           [most recent eval 07/23/21]   Previous diagnoses include depression, schizophrenia 2016 when starting college and ADHD 2017. Was initially diagnosed with schizophrenia and placed on antipsychotic for 2 years. In 2018 diagnosis changed from schizophrenia to bipolar disorder and placed on lamotrigine. Lamotrigine was not effective and transitioned to lithium at end of 2018. Mood has been stable with no manic episodes since starting lithium. Mood symptoms have been well managed at subtherapeutic lithium levels. He recalls he grew up in a family where \"mental illness wasn't real\" but struggled with depression starting in 1st or 2nd grade, \"regularly had teachers pull me aside and ask if everything was OK at home.\" He reports a history of chronic suicidal ideation since 3rd grade, particularly when depressed. He has never \"actually attempted it.\" Has also had intrusive thoughts \"all the time\" about hurting or killing himself, despite not wanting to self-harm or having ever done it. Episodes began to get worse and worse and he \"played the 'things are going to get better when I get to the next stage of life' game.\" He first started medications at the beginning of college, when he had his first manic episode, likely " "triggered by many factors including lack of sleep , along with stress. Has not had a manic episode since he started lithium about 2018/2019.  In college he got care through the [facility]. Then he was working with [employer] for some time, and had a psychiatrist that advised him to immediately go off all his mood stabilizers, which made him concerned and he wanted a second opinion. After transferring care to [facility] he subsequently self discontinued venlafaxine due to affective flattening and was transitioned off lithium to cariprazine with good effect.     Psych pertinent item history includes suicidal ideation, mutiple psychotropic trials , psych hosp (x1, but several psych ER visits) and substance use: alcohol, cannabis and hallucinogens     SUBJECTIVE   Since the last visit:   Mood: \"really good\" , minimal anxiety or depression  Sleep: 6-7 hrs per night, uninterrupted.   Meds: Better focus at the lower dose of Concerta. Libido has improved. Discontinued cariprazine 2 weeks after starting lithium. Dry mouth and thirst with lithium.  Stress: \"not much\"  Health: Appetite has reduced after stopping cariprazine. Started intermittent fasting  Therapy: Deferred until March 2022    RECENT PSYCH ROS:   Depression:  none  Elevated:  none  Psychosis:  none  Anxiety:  excessive worry and social anxiety  Trauma Related:  none  Sleep: yes  Other: N/A    Adverse Effects: none   Pertinent Negative Symptoms: No violent ideation, psychosis, hallucinations or marline, hypomania  Recent Substance Use:     Alcohol- none  Tobacco- none  Caffeine- 1-2 coffee's per day  Cannabis- none     Opioids- none           Narcan Kit- n/a   Other illicit drugs- none, no use in 2 years    PSYCH and SUBSTANCE USE Critical Summary Points since July 2021 6/18/21: Stopped effexor on own  7/26/21: Lithium increased to 750 mg for 2 weeks then to 900 mg if well-tolerated.  8/23/21: Lithium return to 600 mg daily.  Cariprazine 1.5 mg initiated  10/4/21: " "Concerta increased to 54 mg daily. Lithium self-discontinued  11/29/21: Lithium resumed at 300 mg qam for 14 days, then increase to 300 BID. Concerta decreased to 36 mg daily.  1/3/22: No changes  Past Medication Trials        Medication Max Dose (mg) Dates / Duration Helpful? DC Reason / Adverse Effects?   lamotrigine    Lack of efficacy   lithium 900 current Y [place] \"on edge\" at higher doses (900), severe lethargy and memory impairment at doses above 600 mg. Despite side effects works better than other mood stabilizer trials.   bupropion    Maria M, while on lithium   escitalopram    Maria M, while on lithium, OCD   atomoxetine   N Hot flashes, 1 month trial   Adderall   1915-3287 Y \"I hated it\" moderate efficacy   lisdexamfetamine   N Lack of efficacy   dexadrine       lurasidone    Somnolence, cognitive slowing   methylphenidate 54 current Y Wears off quick, emotional blunting and low libido.    venlafaxine  150  1781-2335 Y  initially helpful, discontinued due to affect of flattening and emotional blunting.   cariprazine  1.5  2021 to current Y    risperidone    Cognitive slowing   aripirazole    Tremors, cognitive slowing   ziprazidone    Cognitive slowing   olanzapine    Cognitive slowing   cariprazine 1.5 2021-current Y Weight gain      MEDICAL HISTORY and ALLERGY     ALLERGIES: Patient has no known allergies.    There is no problem list on file for this patient.       MEDICAL REVIEW OF SYSTEMS   Contraception- vasectomy    A comprehensive review of systems was performed and is negative other than noted in the HPI.     MEDICATIONS     Current Outpatient Medications   Medication Sig Dispense Refill     lithium ER (LITHOBID) 300 MG CR tablet Take 1 tablet (300 mg) by mouth 2 times daily 60 tablet 1     methylphenidate (CONCERTA) 36 MG CR tablet Take 1 tablet (36 mg) by mouth every morning 30 tablet 0      VITALS   /83   Pulse 86   Wt 109.8 kg (242 lb)     MENTAL STATUS EXAM   Alertness: alert   Appearance: " "adequately groomed  Behavior/Demeanor: cooperative and calm, with fair  eye contact   Speech: normal and regular rate and rhythm  Language: intact and no problems  Psychomotor: normal or unremarkable  Mood: \"really good\"  Affect: full range; congruent to: mood- yes, content- yes  Thought Process/Associations: unremarkable  Thought Content:  Reports none;  Denies suicidal & violent ideation and delusions  Perception:  Reports none;  Denies hallucinations  Insight: good  Judgment: good  Cognition: does  appear grossly intact; formal cognitive testing was not done  Gait and Station: unremarkable     LABS and DATA   PHQ Today: 6  PHQ 5/14/2021 6/18/2021   PHQ-9 Total Score 21 15   Q9: Thoughts of better off dead/self-harm past 2 weeks Nearly every day More than half the days   F/U: Thoughts of suicide or self-harm Yes Yes   F/U: Self harm-plan No No   F/U: Self-harm action No No   F/U: Safety concerns No No         Recent Labs   Lab Test 06/02/21  0827   LITHIUM 0.42*     Recent Labs   Lab Test 06/02/21  0827   CR 1.14   GFRESTIMATED 90      POTASSIUM 4.1   JODI 9.2     Recent Labs   Lab Test 06/02/21  0827   SG 1.025     Recent Labs   Lab Test 06/02/21  0827   TSH 0.98     Recent Labs   Lab Test 06/02/21  0827   GLC 91     No lab results found.  Recent Labs   Lab Test 06/02/21  0827   AST 28   ALT 49   ALKPHOS 90     Recent Labs   Lab Test 06/02/21  0827   WBC 5.6   ANEU 3.2   HGB 17.2          ECG: None on file     PSYCHOTROPIC DRUG INTERACTIONS     No significant drug interactions    MANAGEMENT:  Monitoring for adverse effects, routine vitals and routine labs     RISK STATEMENT for SAFETY   Lux Bailey did not appear to be an imminent safety risk to self or others.    TREATMENT RISK STATEMENT: The risks, benefits, alternatives and potential adverse effects have been discussed and are understood by the pt. The pt understands the risks of using street drugs or alcohol. There are no medical " contraindications, the pt agrees to treatment with the ability to do so. The pt knows to call the clinic for any problems or to access emergency care if needed.  Medical and substance use concerns are documented above.  Psychotropic drug interaction check was done, including changes made today.     PROVIDER: Lupillo Petty MD    Patient not staffed in clinic.  Note will be reviewed and signed by supervisor Dr. Lozano.      Level of Medical Decision Making:   - At least 2 stable chronic problems{

## 2022-01-03 ENCOUNTER — OFFICE VISIT (OUTPATIENT)
Dept: PSYCHIATRY | Facility: CLINIC | Age: 27
End: 2022-01-03
Attending: PSYCHIATRY & NEUROLOGY
Payer: COMMERCIAL

## 2022-01-03 VITALS — WEIGHT: 242 LBS | DIASTOLIC BLOOD PRESSURE: 83 MMHG | SYSTOLIC BLOOD PRESSURE: 135 MMHG | HEART RATE: 86 BPM

## 2022-01-03 DIAGNOSIS — F90.0 ATTENTION DEFICIT HYPERACTIVITY DISORDER (ADHD), PREDOMINANTLY INATTENTIVE TYPE: ICD-10-CM

## 2022-01-03 DIAGNOSIS — F31.70 BIPOLAR I DISORDER IN REMISSION (H): Primary | ICD-10-CM

## 2022-01-03 PROCEDURE — 99214 OFFICE O/P EST MOD 30 MIN: CPT | Mod: HN | Performed by: STUDENT IN AN ORGANIZED HEALTH CARE EDUCATION/TRAINING PROGRAM

## 2022-01-03 PROCEDURE — G0463 HOSPITAL OUTPT CLINIC VISIT: HCPCS

## 2022-01-03 RX ORDER — LITHIUM CARBONATE 300 MG/1
300 TABLET, FILM COATED, EXTENDED RELEASE ORAL 2 TIMES DAILY
Qty: 60 TABLET | Refills: 1 | Status: SHIPPED | OUTPATIENT
Start: 2022-01-03 | End: 2022-03-04

## 2022-01-03 ASSESSMENT — PATIENT HEALTH QUESTIONNAIRE - PHQ9: SUM OF ALL RESPONSES TO PHQ QUESTIONS 1-9: 6

## 2022-01-03 ASSESSMENT — PAIN SCALES - GENERAL: PAINLEVEL: NO PAIN (0)

## 2022-01-03 NOTE — PATIENT INSTRUCTIONS
**For crisis resources, please see the information at the end of this document**     Patient Education      Thank you for coming to the [facility] MENTAL HEALTH & ADDICTION [place] CLINIC.    Lab Testing:  If you had lab testing today and your results are reassuring or normal they will be mailed to you or sent through [employer] within 7 days. If the lab tests need quick action we will call you with the results. The phone number we will call with results is # [phone] (home) . If this is not the best number please call our clinic and change the number.    Medication Refills:  If you need any refills please call your pharmacy and they will contact us. Our fax number for refills is [phone]. Please allow three business for refill processing. If you need to  your refill at a new pharmacy, please contact the new pharmacy directly. The new pharmacy will help you get your medications transferred.     Scheduling:  If you have any concerns about today's visit or wish to schedule another appointment please call our office during normal business hours [phone] (8-5:00 M-F)    Contact Us:  Please call [phone] during business hours (8-5:00 M-F).  If after clinic hours, or on the weekend, please call  [phone].    Financial Assistance [phone]  [facility] [employer] [facility] Billing [phone]  Central Billing Office, [facility]: [phone]  [place] Billing [phone]  Medical Records [phone]  [place] Patient Bill of Rights [email or web address]       MENTAL HEALTH CRISIS NUMBERS:  For a medical emergency please call  911 or go to the nearest ER.     [facility]:   [facility] -[phone]   Crisis Residence [facility] Residence -[phone]   Walk-In Counseling Center \[facility]\"" -[phone]   COPE 24/7 [facility] Mobile Team -[phone] (adults)/[phone] (child)  CHILD: [facility] needs assessment  team - [phone]      [facility]:   [facility] - [phone]   Walk-in counseling [facility] - [phone]   Walk-in counseling [facility] - [phone]   Crisis Residence [facility] Residence - [phone]  Urgent Care Adult Mental [place]-[phone] mobile unit/ 24/7 crisis line    National Crisis Numbers:   National Suicide Prevention Lifeline: 3-482-102-TALK (403-674-7803)  Poison Control Center - 8-875-573-0815  Soundl.ly/resources for a list of additional resources (SOS)  Trans Lifeline a hotline for transgender people 1-943-677-3177  The H2HCare Project a hotline for LGBT youth 0-005-134-4835  Crisis Text Line: For any crisis 24/7   To: 360947  see www.crisistextline.org  - IF MAKING A CALL FEELS TOO HARD, send a text!         Again thank you for choosing [facility] MENTAL HEALTH & ADDICTION [facility] and please let us know how we can best partner with you to improve you and your family's health.    You may be receiving a survey regarding this appointment. We would love to have your feedback, both positive and negative. The survey is done by an external company, so your answers are anonymous.         Treatment Plan Today:     1) Medications- No changes, continue current medications as prescribed     2) Follow-up appt with Dr Petty in 8 weeks    3) Crisis numbers are below and clinic after hours number is [phone]

## 2022-01-04 RX ORDER — METHYLPHENIDATE HYDROCHLORIDE 36 MG/1
36 TABLET ORAL EVERY MORNING
Qty: 30 TABLET | Refills: 0 | Status: SHIPPED | OUTPATIENT
Start: 2022-03-02 | End: 2022-03-04

## 2022-01-04 RX ORDER — METHYLPHENIDATE HYDROCHLORIDE 36 MG/1
36 TABLET ORAL EVERY MORNING
Qty: 30 TABLET | Refills: 0 | Status: SHIPPED | OUTPATIENT
Start: 2022-01-30 | End: 2022-03-01

## 2022-03-04 ENCOUNTER — OFFICE VISIT (OUTPATIENT)
Dept: PSYCHIATRY | Facility: CLINIC | Age: 27
End: 2022-03-04
Attending: PSYCHIATRY & NEUROLOGY
Payer: COMMERCIAL

## 2022-03-04 DIAGNOSIS — F31.70 BIPOLAR I DISORDER IN REMISSION (H): Primary | ICD-10-CM

## 2022-03-04 DIAGNOSIS — F90.0 ATTENTION DEFICIT HYPERACTIVITY DISORDER (ADHD), PREDOMINANTLY INATTENTIVE TYPE: ICD-10-CM

## 2022-03-04 PROCEDURE — 99214 OFFICE O/P EST MOD 30 MIN: CPT | Mod: HN | Performed by: STUDENT IN AN ORGANIZED HEALTH CARE EDUCATION/TRAINING PROGRAM

## 2022-03-04 RX ORDER — LITHIUM CARBONATE 300 MG/1
300 TABLET, FILM COATED, EXTENDED RELEASE ORAL 2 TIMES DAILY
Qty: 60 TABLET | Refills: 1 | Status: SHIPPED | OUTPATIENT
Start: 2022-03-04 | End: 2022-04-01

## 2022-03-04 RX ORDER — METHYLPHENIDATE HYDROCHLORIDE 36 MG/1
36 TABLET ORAL EVERY MORNING
Qty: 30 TABLET | Refills: 0 | Status: SHIPPED | OUTPATIENT
Start: 2022-04-02 | End: 2022-04-01

## 2022-03-04 NOTE — PROGRESS NOTES
"     [facility]  Psychiatry Clinic  MEDICAL PROGRESS NOTE     CARE TEAM:  PCP- Physician No Ref-Primary, Psychotherapist- None,     Lux Bailey is a 26 year old who prefers the name Regino and uses pronouns he, him, himself.      DIAGNOSIS   Bipolar Disorder type 1, most recent episode manic, severe, with psychosis, in full remission  ADHD, inattentive type   Obessive compulsive symptoms, r/o OCD     ASSESSMENT   Today:   Reports his mood is \"a mixed bag\" and endorses high levels of anxiety and depression. He notes lithium is working well denies current medication side effects and states his ADHD medications have been working better. He is sleeping approximately 6-1/2 to 7 hours each night and endorses good quality sleep. He notes high psychosocial stressors attributed to workplace stress, social anxiety and financial strain. He denies current health concerns and reports he is interested in psychotherapy but will not establish care until his new insurance goes into effect. Will expresses interest in starting a medication for anxiety and depression and was amenable to recommendation of sertraline. Will notes he attempted to get his lithium lab but the clinic stated he had no lab on order. He is pending lithium lab on 7/26/2021 was verified and repeat lithium lab was ordered and will was encouraged to obtain this prior to his next visit.    Given Aguilar high levels of anxiety and depression, absence of current manic symptoms, adequate sleep and desire to start an antidepressant medication, starting sertraline 50 milligrams daily is indicated. Side effects and risk of marline was reviewed with Will and he agreed to contact his care team if significant side effects or manic symptoms occur. Will agreed to obtain lithium labs prior to his next follow up visit.     Future considerations: Continue titration of lithium pending lithium level.  Consider trazodone or mirtazapine as alternative " "antidepressant medication.  Consider initiation of PDE 5 inhibitor if erectile dysfunction is a concern again. Consider VPA and re-evaluate other SGA trials.     MNPMP was checked today:  Indicates taking controlled medication as prescribed.     PLAN                                                                                                                1) Meds-  - Continue Concerta 36 mg daily  - Continue lithium 300 mg BID  - Start sertraline 50 mg daily.    Non prescribed medications:  - magnesium supplement and daily multivitamin qhs    2) Psychotherapy- Interested in therapy for ADHD and eating disorder history. Referral placed on 10/2021. Pending insurance approval    3) Next due-  Labs- Lithium level pending, ordered on 7/26/2021.  EKG- As needed  Rating scales-PHQ 9    4) Referrals-  None     5) Dispo- Return to clinic in 4 weeks.      PERTINENT BACKGROUND                           [most recent eval 07/23/21]   Previous diagnoses include depression, schizophrenia 2016 when starting college and ADHD 2017. Was initially diagnosed with schizophrenia and placed on antipsychotic for 2 years. In 2018 diagnosis changed from schizophrenia to bipolar disorder and placed on lamotrigine. Lamotrigine was not effective and transitioned to lithium at end of 2018. Mood has been stable with no manic episodes since starting lithium. Mood symptoms have been well managed at subtherapeutic lithium levels. He recalls he grew up in a family where \"mental illness wasn't real\" but struggled with depression starting in 1st or 2nd grade, \"regularly had teachers pull me aside and ask if everything was OK at home.\" He reports a history of chronic suicidal ideation since 3rd grade, particularly when depressed. He has never \"actually attempted it.\" Has also had intrusive thoughts \"all the time\" about hurting or killing himself, despite not wanting to self-harm or having ever done it. Episodes began to get worse and worse and he " "\"played the 'things are going to get better when I get to the next stage of life' game.\" He first started medications at the beginning of college, when he had his first manic episode, likely triggered by many factors including lack of sleep , along with stress. Has not had a manic episode since he started lithium about 2018/2019.  In college he got care through the university clinic. Then he was working with [facility] for some time, and had a psychiatrist that advised him to immediately go off all his mood stabilizers, which made him concerned and he wanted a second opinion. After transferring care to [facility] he subsequently self discontinued venlafaxine due to affective flattening.    Psych pertinent item history includes suicidal ideation, mutiple psychotropic trials , psych hosp (x1, but several psych ER visits) and substance use: alcohol, cannabis and hallucinogens     SUBJECTIVE   Since the last visit:  Mood: \"mixed bag\" with high levels or anxiety and depression  Meds: lithium working well. Denies side effects. Weight is stable. ADHD medications working better.   Sleep: \"fine\" 6.5-7 hrs per night.   Sress: very high, stressed about job, social anxiety, financial stressors  Health: Denies  Therapy: Pending insurance    RECENT PSYCH ROS:   Depression:  depressed mood, poor concentration /memory, feeling worthless and excessive guilt  Elevated:  none  Psychosis:  none  Anxiety:  excessive worry, feeling fearful, social anxiety and nervous/overwhelmed  Trauma Related:  none  Sleep: yes  Other: N/A    Adverse Effects: none   Pertinent Negative Symptoms: No violent ideation, psychosis, hallucinations or marline, hypomania  Recent Substance Use:     Alcohol- none  Tobacco- none  Caffeine- none  Cannabis- none     Opioids- none           Narcan Kit- n/a   Other illicit drugs- none, no use in 2 years    PSYCH and SUBSTANCE USE Critical Summary Points since July 2021 6/18/21: Stopped effexor on own  7/26/21: Lithium increased " "to 750 mg for 2 weeks then to 900 mg if well-tolerated.  8/23/21: Lithium return to 600 mg daily.  Cariprazine 1.5 mg initiated  10/4/21: Concerta increased to 54 mg daily. Lithium self-discontinued  11/29/21: Lithium resumed at 300 mg qam for 14 days, then increase to 300 BID. Concerta decreased to 36 mg daily.  1/3/22: No changes  3/4/22: Start sertraline 50 mg daily  Past Medication Trials        Medication Max Dose (mg) Dates / Duration Helpful? DC Reason / Adverse Effects?   lamotrigine    Lack of efficacy   lithium 900 current Y [place] \"on edge\" at higher doses (900), severe lethargy and memory impairment at doses above 600 mg. Despite side effects works better than other mood stabilizer trials.   bupropion    Maria M, while on lithium   escitalopram    Maria M, while on lithium, OCD   atomoxetine   N Hot flashes, 1 month trial   Adderall   3362-9782 Y \"I hated it\" moderate efficacy   lisdexamfetamine   N Lack of efficacy   dexadrine       lurasidone    Somnolence, cognitive slowing   methylphenidate 54 current Y Wears off quick, emotional blunting and low libido.    venlafaxine  150  5120-0929 Y  initially helpful, discontinued due to affect of flattening and emotional blunting.   cariprazine  1.5  2021 to current Y    risperidone    Cognitive slowing   aripirazole    Tremors, cognitive slowing   ziprazidone    Cognitive slowing   olanzapine    Cognitive slowing   cariprazine 1.5 2021-current Y Weight gain      MEDICAL HISTORY and ALLERGY     ALLERGIES: Patient has no known allergies.    There is no problem list on file for this patient.       MEDICAL REVIEW OF SYSTEMS   Contraception- vasectomy    A comprehensive review of systems was performed and is negative other than noted in the HPI.     MEDICATIONS     Current Outpatient Medications   Medication Sig Dispense Refill     lithium ER (LITHOBID) 300 MG CR tablet Take 1 tablet (300 mg) by mouth 2 times daily 60 tablet 1     methylphenidate (CONCERTA) 36 MG CR " "tablet Take 1 tablet (36 mg) by mouth every morning 30 tablet 0      VITALS   There were no vitals taken for this visit.    MENTAL STATUS EXAM   Alertness: alert   Appearance: adequately groomed  Behavior/Demeanor: cooperative and calm, with good  eye contact   Speech: normal and regular rate and rhythm  Language: intact and no problems  Psychomotor: normal or unremarkable  Mood: \"a mixed bag\"  Affect: full range; congruent to: mood- yes, content- yes  Thought Process/Associations: unremarkable  Thought Content:  Reports none;  Denies suicidal & violent ideation and delusions  Perception:  Reports none;  Denies hallucinations  Insight: good  Judgment: good  Cognition: does  appear grossly intact; formal cognitive testing was not done  Gait and Station: N/A (telehealth)     LABS and DATA     PHQ 5/14/2021 6/18/2021 1/3/2022   PHQ-9 Total Score 21 15 6   Q9: Thoughts of better off dead/self-harm past 2 weeks Nearly every day More than half the days Not at all   F/U: Thoughts of suicide or self-harm Yes Yes -   F/U: Self harm-plan No No -   F/U: Self-harm action No No -   F/U: Safety concerns No No -         Recent Labs   Lab Test 06/02/21  0827   LITHIUM 0.42*     Recent Labs   Lab Test 06/02/21  0827   CR 1.14   GFRESTIMATED 90      POTASSIUM 4.1   JODI 9.2     Recent Labs   Lab Test 06/02/21  0827   SG 1.025     Recent Labs   Lab Test 06/02/21  0827   TSH 0.98     Recent Labs   Lab Test 06/02/21  0827   GLC 91     No lab results found.  Recent Labs   Lab Test 06/02/21 0827   AST 28   ALT 49   ALKPHOS 90     Recent Labs   Lab Test 06/02/21  0827   WBC 5.6   ANEU 3.2   HGB 17.2          ECG: None on file     PSYCHOTROPIC DRUG INTERACTIONS     Additive serotonin syndrome: Lithium, sertraline    MANAGEMENT:  Monitoring for adverse effects, routine vitals and routine labs     RISK STATEMENT for SAFETY   Lux Bailey did not appear to be an imminent safety risk to self or others.    TREATMENT RISK " STATEMENT: The risks, benefits, alternatives and potential adverse effects have been discussed and are understood by the pt. The pt understands the risks of using street drugs or alcohol. There are no medical contraindications, the pt agrees to treatment with the ability to do so. The pt knows to call the clinic for any problems or to access emergency care if needed.  Medical and substance use concerns are documented above.  Psychotropic drug interaction check was done, including changes made today.     PROVIDER: Lupillo Petty MD    Patient not staffed in clinic.  Note will be reviewed and signed by supervisor Dr. Lozano.      Level of Medical Decision Making:   - At least 1 chronic problem that is not stable  - Engaged in prescription drug management during visit (discussed any medication benefits, side effects, alternatives, etc.)

## 2022-03-04 NOTE — PATIENT INSTRUCTIONS
**For crisis resources, please see the information at the end of this document**     Patient Education      Thank you for coming to the [facility] MENTAL HEALTH & ADDICTION [place] CLINIC.    Lab Testing:  If you had lab testing today and your results are reassuring or normal they will be mailed to you or sent through [employer] within 7 days. If the lab tests need quick action we will call you with the results. The phone number we will call with results is # [phone] (home) . If this is not the best number please call our clinic and change the number.    Medication Refills:  If you need any refills please call your pharmacy and they will contact us. Our fax number for refills is [phone]. Please allow three business for refill processing. If you need to  your refill at a new pharmacy, please contact the new pharmacy directly. The new pharmacy will help you get your medications transferred.     Scheduling:  If you have any concerns about today's visit or wish to schedule another appointment please call our office during normal business hours [phone] (8-5:00 M-F)    Contact Us:  Please call [phone] during business hours (8-5:00 M-F).  If after clinic hours, or on the weekend, please call  [phone].    Financial Assistance [phone]  [facility] Billing [phone]  Central Billing Office, [facility]: [phone]  [place] Billing [phone]  Medical Records [phone]  [place] Patient Bill of Rights [email or web address]       MENTAL HEALTH CRISIS NUMBERS:  For a medical emergency please call  911 or go to the nearest ER.     [facility]:   [facility] -[phone]   Crisis Residence [facility] Residence -[phone]   Walk-In Counseling Center [facility] -[phone]   COPE 24/7 [place] Mobile Team -[phone] (adults)/[phone] (child)  CHILD: [facility] needs assessment  team - [phone]      [facility]:   [facility] - [phone]   Walk-in counseling [facility] - [phone]   Walk-in counseling [facility] - [phone]   Crisis Residence [facility] Residence - [phone]  Urgent Care Adult Mental [facility]-[phone] mobile unit/ 24/7 crisis line    National Crisis Numbers:   National Suicide Prevention Lifeline: 6-831-417-TALK (264-843-0781)  Poison Control Center - 8-945-412-4760  Dig[employer]iz/resources for a list of additional resources (SOS)  Trans Lifeline a hotline for transgender people 1-736.677.3954  The PathCentral Project a hotline for LGBT youth 4-072-125-4726  Crisis Text Line: For any crisis 24/7   To: 166589  see www.crisistextline.org  - IF MAKING A CALL FEELS TOO HARD, send a text!         Again thank you for choosing [facility] MENTAL HEALTH & ADDICTION [place] and please let us know how we can best partner with you to improve you and your family's health.    You may be receiving a survey regarding this appointment. We would love to have your feedback, both positive and negative. The survey is done by an external company, so your answers are anonymous.         Treatment Plan Today:     1) Medications- Start sertraline 50 mg daily, continue other medications as prescribed.     2) Follow-up appt with Dr Petty in 4 weeks    3) Crisis numbers are below and clinic after hours number is [phone]

## 2022-03-07 ENCOUNTER — TELEPHONE (OUTPATIENT)
Dept: PSYCHIATRY | Facility: CLINIC | Age: 27
End: 2022-03-07
Payer: COMMERCIAL

## 2022-03-07 NOTE — TELEPHONE ENCOUNTER
[facility] Call Center    Phone Message    May a detailed message be left on voicemail: yes     Reason for Call: Medication Refill Request    Has the patient contacted the pharmacy for the refill? Yes   Name of medication being requested: concerta  Provider who prescribed the medication: Jovanny  Pharmacy:    [facility] [id number] [facility] - [place], [place] - [address]    Date medication is needed: April rx got to the pharmacy, but the one for march did not      Action Taken: Message routed to:  Other: nursing pool    Travel Screening: Not Applicable                     Follow up:  Spoke with [facility] and they did have rx for March, they just didn't see it. They will fill it for patient today. Patient was updated.

## 2022-03-27 DIAGNOSIS — F31.70 BIPOLAR I DISORDER IN REMISSION (H): ICD-10-CM

## 2022-04-01 ENCOUNTER — VIRTUAL VISIT (OUTPATIENT)
Dept: PSYCHIATRY | Facility: CLINIC | Age: 27
End: 2022-04-01
Attending: PSYCHIATRY & NEUROLOGY
Payer: COMMERCIAL

## 2022-04-01 DIAGNOSIS — F90.0 ATTENTION DEFICIT HYPERACTIVITY DISORDER (ADHD), PREDOMINANTLY INATTENTIVE TYPE: ICD-10-CM

## 2022-04-01 DIAGNOSIS — F31.70 BIPOLAR I DISORDER IN REMISSION (H): Primary | ICD-10-CM

## 2022-04-01 DIAGNOSIS — G47.9 SLEEP DISORDER: ICD-10-CM

## 2022-04-01 PROCEDURE — 99214 OFFICE O/P EST MOD 30 MIN: CPT | Mod: HN | Performed by: STUDENT IN AN ORGANIZED HEALTH CARE EDUCATION/TRAINING PROGRAM

## 2022-04-01 RX ORDER — METHYLPHENIDATE HYDROCHLORIDE 36 MG/1
36 TABLET ORAL EVERY MORNING
Qty: 30 TABLET | Refills: 0 | Status: SHIPPED | OUTPATIENT
Start: 2022-05-03 | End: 2022-05-06

## 2022-04-01 RX ORDER — LITHIUM CARBONATE 300 MG/1
300 TABLET, FILM COATED, EXTENDED RELEASE ORAL 2 TIMES DAILY
Qty: 60 TABLET | Refills: 1 | Status: SHIPPED | OUTPATIENT
Start: 2022-04-01 | End: 2022-05-06

## 2022-04-01 NOTE — PATIENT INSTRUCTIONS
**For crisis resources, please see the information at the end of this document**   Patient Education    Thank you for coming to the [facility] MENTAL HEALTH & ADDICTION [place] CLINIC.    Lab Testing:  If you had lab testing today and your results are reassuring or normal they will be mailed to you or sent through [employer] within 7 days. If the lab tests need quick action we will call you with the results. The phone number we will call with results is # [phone]. If this is not the best number please call our clinic and change the number.     Medication Refills:  If you need any refills please call your pharmacy and they will contact us. Our fax number for refills is [phone]. Please allow three business days for refill processing.   If you need to change to a different pharmacy, please contact the new pharmacy directly. The new pharmacy will help you get your medications transferred.     Contact Us:  Please call [phone] during business hours (8-5:00 M-F).  If you have medication related questions after clinic hours, or on the weekend, please call [phone].    Financial Assistance [phone]  Medical Records [phone]       MENTAL HEALTH CRISIS RESOURCES:  For a emergency help, please call 911 or go to the nearest Emergency Department.     Emergency Walk-In Options:   Em[facility] Unit @ [facility] ([place]): [phone] - Specialized mental health emergency area designed to be calming  [facility] ([place]): [phone]  [facility] ([place]): [phone]  [facility]): [phone]    County Crisis Information:   [place]: [phone]  [place]: [phone]  [place] (ATIL[place]) - Adult: [phone]     Child: [phone]  [place] - Adult: [phone]     Child: [phone]  [place]: [phone]  List of all [facility] resources:    https://[place].gov/dhs/people-we-serve/adults/health-care/mental-health/resources/crisis-contacts.jsp    National Crisis Information:   Crisis Text Line: Text  [place]  to 824378  National Suicide Prevention Lifeline: 1-277-607-TALK (1-779.381.3994)       For online chat options, visit https://suicidepreventionlifeline.org/chat/  Poison Control Center: 1-203.874.2624  Trans Lifeline: 1-456.417.2566 - Hotline for transgender people of all ages  The [place] Project: 2-463-623-3216 - Hotline for LGBT youth     For Non-Emergency Support:   Fast Tracker: Mental Health & Substance Use Disorder Resources -   https://www.[employer].org/       Treatment Plan Today:     1) Medications-no medication changes, continue current medications as prescribed    2) Follow-up appt with Dr Petty in 4 weeks    3) Crisis numbers are below and clinic after hours number is [phone]    4) Sleep medicine evaluation referral placed today.  Clinic will call within 1 week to schedule intake, contact provider if not called by clinic

## 2022-04-01 NOTE — PROGRESS NOTES
"VIDEO VISIT  Lux Bailey is a 26 year old patient who is being evaluated via a billable video visit.      The patient has been notified of following:   \"We have found that certain health care needs can be provided without the need for an in-person physical exam. This service lets us provide the care you need with a video conversation. If a prescription is necessary we can send it directly to your pharmacy. If lab work is needed we can place an order for that and you can then stop by our lab to have the test done at a later time. Insurers are generally covering virtual visits as they would in-office visits so billing should not be different than normal.  If for some reason you do get billed incorrectly, you should contact the billing office to correct it and that number is in the AVS .    Patient has given verbal consent for video visit?: Yes   How would you like to obtain your AVS?: [email or web address]  AVS SmartPhrase [PsychAVS] has been placed in 'Patient Instructions': Yes      Video- Visit Details  Type of service:  video visit for medication management  Time of service:    Date:  04/01/2022    Video Start Time:  8:16 AM        Video End Time:  8:50 am    Reason for video visit:  Patient has requested telehealth visit  Originating Site (patient location):  [facility]   Location- Patient's home  Distant Site (provider location):  [facility]  Mode of Communication:  [facility] Conference [facility]  Consent:  Patient has given verbal consent for video visit?: Yes        [facility]  Psychiatry Clinic  MEDICAL PROGRESS NOTE     CARE TEAM:  PCP- Physician No Ref-Primary, Psychotherapist- None,     Lux Bailey is a 26 year old who prefers the name Will and uses pronouns he, him, himself.      DIAGNOSIS   Bipolar Disorder type 1, most recent episode manic, severe, with psychosis, in full remission  ADHD, inattentive type   Obessive compulsive symptoms, r/o OCD     ASSESSMENT " "  Today:   We regino reports his mood is \"a lot better\" with minimal depression and moderate anxiety that is manageable.  Parris reports initial low libido and some mild mood elevation when starting sertraline for the first 1.5 weeks that has since resolved.  He reports good adherence to his medications and plans to obtain his lithium labs today.  Regarding sleep he reports approximately 6.5 to 7 hours per night, denies nightmares and endorses increased frequency of vivid dreams that are not bothersome.  Regino expresses interest and a sleep medicine evaluation and reports daytime fatigue, morning headaches and dry mouth, snoring and apneic episodes reported by his partner.  Additionally his father has sleep apnea and his dentist reported signs of bruxism consistent with sleep apnea.  Regino endorses an overall reduced stress level after starting sertraline and states his financial and job concerns have improved.  Social anxiety remains a challenge.  Regarding health he denies any current concerns.    Given Regino's improved mood after starting sertraline, absence of bothersome side effects, absence of manic symptoms no medication changes are indicated at this time.  Regino agreed to obtain lithium labs prior to next visit reports satisfaction with his current lithium dose.  He reports a desire to discuss ADHD medication changes in the future but prefers to maintain his current dose at this time.  Given Aguilar history of daytime fatigue, morning headaches and dry mouth, snoring, suspected apneic episodes observed by partner, family history of sleep apnea, and signs of bruxism a referral for sleep medicine evaluation is indicated and was placed today.    Future considerations: Continue titration of lithium pending lithium level.  Consider trazodone or mirtazapine as alternative antidepressant medication.  Consider initiation of PDE 5 inhibitor if erectile dysfunction is a concern again. Consider VPA and re-evaluate other SGA " "trials.     [place]NPMP was checked today:  Indicates taking controlled medication as prescribed.     PLAN                                                                                                                1) Meds-  - Continue Concerta 36 mg daily  - Continue lithium 300 mg BID  - Continue sertraline 50 mg daily.    Non prescribed medications:  - magnesium supplement and daily multivitamin qhs    2) Psychotherapy- Interested in therapy for ADHD and eating disorder history. Referral placed on 10/2021. Pending insurance approval    3) Next due-  Labs- Lithium level pending, ordered on 7/26/2021.  Patient encouraged to obtain labs today  EKG- As needed  Rating scales-PHQ 9     4) Referrals-  Sleep medicine referral placed today     5) Dispo- Return to clinic in 4 weeks.      PERTINENT BACKGROUND                           [most recent eval 07/23/21]   Previous diagnoses include depression, schizophrenia 2016 when starting college and ADHD 2017. Was initially diagnosed with schizophrenia and placed on antipsychotic for 2 years. In 2018 diagnosis changed from schizophrenia to bipolar disorder and placed on lamotrigine. Lamotrigine was not effective and transitioned to lithium at end of 2018. Mood has been stable with no manic episodes since starting lithium. Mood symptoms have been well managed at subtherapeutic lithium levels. He recalls he grew up in a family where \"mental illness wasn't real\" but struggled with depression starting in 1st or 2nd grade, \"regularly had teachers pull me aside and ask if everything was OK at home.\" He reports a history of chronic suicidal ideation since 3rd grade, particularly when depressed. He has never \"actually attempted it.\" Has also had intrusive thoughts \"all the time\" about hurting or killing himself, despite not wanting to self-harm or having ever done it. Episodes began to get worse and worse and he \"played the 'things are going to get better when I get to the next stage of " "life' game.\" He first started medications at the beginning of college, when he had his first manic episode, likely triggered by many factors including lack of sleep , along with stress. Has not had a manic episode since he started lithium about 2018/2019.  In college he got care through the university clinic. Then he was working with [facility] for some time, and had a psychiatrist that advised him to immediately go off all his mood stabilizers, which made him concerned and he wanted a second opinion. After transferring care to [facility] he subsequently self discontinued venlafaxine due to affective flattening.    Psych pertinent item history includes suicidal ideation, mutiple psychotropic trials , psych hosp (x1, but several psych ER visits) and substance use: alcohol, cannabis and hallucinogens     SUBJECTIVE   Since the last visit:   Mood: \"a lot better\" no depression and manageable anxiety  Meds: Low libido that has since. Will do lithium level today.  Sleep: \"fine\" 6.5-7 hrs per night. No nightmares. More vivid dreams that are not bothersome.   Stress: Started feeling less fatigued 2 weeks ago. Down from 8/10 to now 5-6/10. Financial and job concerns improved. Social anxiety remain difficult.   Health: No health concerns. Father has sleep apnea and    Therapy: Pending insurance improval  Maria M: Some elevated mood initially with sertraline for 1.5 weeks that has since resolved.     RECENT PSYCH ROS:   Depression:  insomnia  Elevated:  increased energy  Psychosis:  none  Anxiety:  excessive worry, social anxiety and nervous/overwhelmed  Trauma Related:  none  Sleep: dysregulation  Other: yes    Adverse Effects: none   Pertinent Negative Symptoms: No violent ideation, psychosis, hallucinations or maria m, hypomania  Recent Substance Use:     Alcohol- none  Tobacco- none  Caffeine- 1 coffee every 1-2 days  Cannabis- none     Opioids- none           Narcan Kit- n/a   Other illicit drugs- none, no use in 2 years    PSYCH and " "SUBSTANCE USE Critical Summary Points since July 2021 6/18/21: Stopped effexor on own  7/26/21: Lithium increased to 750 mg for 2 weeks then to 900 mg if well-tolerated.  8/23/21: Lithium return to 600 mg daily.  Cariprazine 1.5 mg initiated  10/4/21: Concerta increased to 54 mg daily. Lithium self-discontinued  11/29/21: Lithium resumed at 300 mg qam for 14 days, then increase to 300 BID. Concerta decreased to 36 mg daily.  1/3/22: No changes  3/4/22: Start sertraline 50 mg daily  4/1/22: No medication changes, sleep medicine referral placed  Past Medication Trials        Medication Max Dose (mg) Dates / Duration Helpful? DC Reason / Adverse Effects?   lamotrigine    Lack of efficacy   lithium 900 current Y [place] \"on edge\" at higher doses (900), severe lethargy and memory impairment at doses above 600 mg. Despite side effects works better than other mood stabilizer trials.   bupropion    Maria M, while on lithium   escitalopram    Maria M, while on lithium, OCD   atomoxetine   N Hot flashes, 1 month trial   Adderall   1541-7488 Y \"I hated it\" moderate efficacy   lisdexamfetamine   N Lack of efficacy   dexadrine       lurasidone    Somnolence, cognitive slowing   methylphenidate 54 current Y Wears off quick, emotional blunting and low libido.    venlafaxine  150  4555-0816 Y  initially helpful, discontinued due to affect of flattening and emotional blunting.   cariprazine  1.5  2021 to current Y    risperidone    Cognitive slowing   aripirazole    Tremors, cognitive slowing   ziprazidone    Cognitive slowing   olanzapine    Cognitive slowing   cariprazine 1.5 2021-current Y Weight gain      MEDICAL HISTORY and ALLERGY     ALLERGIES: Patient has no known allergies.    There is no problem list on file for this patient.       MEDICAL REVIEW OF SYSTEMS   Contraception- vasectomy    A comprehensive review of systems was performed and is negative other than noted in the HPI.     MEDICATIONS     Current Outpatient " "Medications   Medication Sig Dispense Refill     lithium ER (LITHOBID) 300 MG CR tablet Take 1 tablet (300 mg) by mouth 2 times daily 60 tablet 1     [START ON 4/2/2022] methylphenidate (CONCERTA) 36 MG CR tablet Take 1 tablet (36 mg) by mouth every morning 30 tablet 0     sertraline (ZOLOFT) 50 MG tablet Take 1 tablet (50 mg) by mouth daily 30 tablet 1      VITALS   There were no vitals taken for this visit.    MENTAL STATUS EXAM   Alertness: alert   Appearance: adequately groomed  Behavior/Demeanor: cooperative and calm, with fair  eye contact   Speech: normal and regular rate and rhythm  Language: intact and no problems  Psychomotor: normal or unremarkable  Mood: \" A lot better\"  Affect: blunted; congruent to: mood- yes, content- yes  Thought Process/Associations: unremarkable  Thought Content:  Reports none;  Denies suicidal & violent ideation and delusions  Perception:  Reports none;  Denies hallucinations  Insight: good  Judgment: good  Cognition: does  appear grossly intact; formal cognitive testing was not done  Gait and Station: N/A (telehealth)     LABS and DATA     PHQ 5/14/2021 6/18/2021 1/3/2022   PHQ-9 Total Score 21 15 6   Q9: Thoughts of better off dead/self-harm past 2 weeks Nearly every day More than half the days Not at all   F/U: Thoughts of suicide or self-harm Yes Yes -   F/U: Self harm-plan No No -   F/U: Self-harm action No No -   F/U: Safety concerns No No -         Recent Labs   Lab Test 06/02/21 0827   LITHIUM 0.42*     Recent Labs   Lab Test 06/02/21 0827   CR 1.14   GFRESTIMATED 90      POTASSIUM 4.1   JODI 9.2     Recent Labs   Lab Test 06/02/21 0827   SG 1.025     Recent Labs   Lab Test 06/02/21 0827   TSH 0.98     Recent Labs   Lab Test 06/02/21 0827   GLC 91     No lab results found.  Recent Labs   Lab Test 06/02/21 0827   AST 28   ALT 49   ALKPHOS 90     Recent Labs   Lab Test 06/02/21 0827   WBC 5.6   ANEU 3.2   HGB 17.2          ECG: None on file     " PSYCHOTROPIC DRUG INTERACTIONS     Additive serotonin syndrome: Lithium, sertraline    MANAGEMENT:  Monitoring for adverse effects, routine vitals and routine labs     RISK STATEMENT for SAFETY   Lux Bailey did not appear to be an imminent safety risk to self or others.    TREATMENT RISK STATEMENT: The risks, benefits, alternatives and potential adverse effects have been discussed and are understood by the pt. The pt understands the risks of using street drugs or alcohol. There are no medical contraindications, the pt agrees to treatment with the ability to do so. The pt knows to call the clinic for any problems or to access emergency care if needed.  Medical and substance use concerns are documented above.  Psychotropic drug interaction check was done, including changes made today.     PROVIDER: Lupillo Petty MD    Patient not staffed in clinic.  Note will be reviewed and signed by supervisor Dr. Lozano.      Level of Medical Decision Making:   - At least 2 stable chronic problems  - Engaged in prescription drug management during visit (discussed any medication benefits, side effects, alternatives, etc.)

## 2022-04-01 NOTE — PROGRESS NOTES
Lux Bailey is a 26 year old who has consented to receive services via billable video visit.      Pt will join video visit via: [employer]  If there are problems joining the visit, send backup video invite via: Text to preferred phone: [phone]      Originating Location (patient location): Patient's home  Distant Location (provider location): [facility] [place] [facility]    Will anyone else be joining the video visit? No    How would you prefer to obtain AVS?: Alvarez

## 2022-05-06 ENCOUNTER — VIRTUAL VISIT (OUTPATIENT)
Dept: PSYCHIATRY | Facility: CLINIC | Age: 27
End: 2022-05-06
Attending: PSYCHIATRY & NEUROLOGY
Payer: COMMERCIAL

## 2022-05-06 DIAGNOSIS — F31.70 BIPOLAR I DISORDER IN REMISSION (H): Primary | ICD-10-CM

## 2022-05-06 DIAGNOSIS — F90.0 ATTENTION DEFICIT HYPERACTIVITY DISORDER (ADHD), PREDOMINANTLY INATTENTIVE TYPE: ICD-10-CM

## 2022-05-06 PROCEDURE — 99214 OFFICE O/P EST MOD 30 MIN: CPT | Mod: HN | Performed by: STUDENT IN AN ORGANIZED HEALTH CARE EDUCATION/TRAINING PROGRAM

## 2022-05-06 RX ORDER — LITHIUM CARBONATE 300 MG/1
300 TABLET, FILM COATED, EXTENDED RELEASE ORAL 2 TIMES DAILY
Qty: 60 TABLET | Refills: 1 | Status: SHIPPED | OUTPATIENT
Start: 2022-05-06 | End: 2022-06-17

## 2022-05-06 RX ORDER — METHYLPHENIDATE HYDROCHLORIDE 36 MG/1
36 TABLET ORAL EVERY MORNING
Qty: 30 TABLET | Refills: 0 | Status: SHIPPED | OUTPATIENT
Start: 2022-06-03 | End: 2022-06-17

## 2022-05-06 RX ORDER — METHYLPHENIDATE HYDROCHLORIDE 5 MG/1
5 TABLET ORAL
Qty: 30 TABLET | Refills: 0 | Status: SHIPPED | OUTPATIENT
Start: 2022-05-06 | End: 2022-06-05

## 2022-05-06 NOTE — PROGRESS NOTES
Lux Bailey is a 26 year old who has consented to receive services via billable video visit.      Pt will join video visit via: [employer]  If there are problems joining the visit, send backup video invite via: Send to preferred e-mail: [email or web address]      Originating Location (patient location): Patient's home  Distant Location (provider location): [facility] [place] [facility]    Will anyone else be joining the video visit? No    How would you prefer to obtain AVS?: Alvarez

## 2022-05-06 NOTE — PATIENT INSTRUCTIONS
**For crisis resources, please see the information at the end of this document**   Patient Education    Thank you for coming to the [facility] [place] [facility].    Lab Testing:  If you had lab testing today and your results are reassuring or normal they will be mailed to you or sent through [employer] within 7 days. If the lab tests need quick action we will call you with the results. The phone number we will call with results is # [phone]. If this is not the best number please call our clinic and change the number.     Medication Refills:  If you need any refills please call your pharmacy and they will contact us. Our fax number for refills is [phone]. Please allow three business days for refill processing.   If you need to change to a different pharmacy, please contact the new pharmacy directly. The new pharmacy will help you get your medications transferred.     Contact Us:  Please call [phone] during business hours (8-5:00 M-F).  If you have medication related questions after clinic hours, or on the weekend, please call [phone].    Financial Assistance [phone]  Medical Records [phone]       MENTAL HEALTH CRISIS RESOURCES:  For a emergency help, please call 911 or go to the nearest Emergency Department.     Emergency Walk-In Options:   Em[facility] Unit @ [facility] ([place]): [phone] - Specialized mental health emergency area designed to be calming  [facility] ([place]): [phone]  [facility] ([place]): [phone]  [facility]): [phone]    County Crisis Information:   [place]: [phone]  [place]: [phone]  [place] ([place]) - Adult: [phone]     Child: [phone]  [place] - Adult: [phone]     Child: [phone]  [place]: [phone]  List of all [facility] resources:    https://[place].gov/dhs/people-we-serve/adults/health-care/mental-health/resources/crisis-contacts.jsp    National Crisis Information:   Crisis Text Line: Text  [place]  to 981213  National Suicide Prevention Lifeline: 3-430-457-TALK (1-622.397.8864)       For online chat options, visit https://suicidepreventionlifeline.org/chat/  Poison Control Center: 1-468.872.6763  Trans Lifeline: 1-478.314.7434 - Hotline for transgender people of all ages  The [employer] Project: 2-823-408-1942 - Hotline for LGBT youth     For Non-Emergency Support:   Fast Tracker: Mental Health & Substance Use Disorder Resources -   https://www.YouAppin.org/      Treatment Plan Today:     1) Medications-start methylphenidate 5 mg daily at 2 PM, continue other medications as prescribed    2) Follow-up appt with Dr Petty in 4 weeks    3) Crisis numbers are below and clinic after hours number is [phone]

## 2022-05-06 NOTE — PROGRESS NOTES
"VIDEO VISIT  Lux Bailey is a 26 year old patient who is being evaluated via a billable video visit.      The patient has been notified of following:   \"We have found that certain health care needs can be provided without the need for an in-person physical exam. This service lets us provide the care you need with a video conversation. If a prescription is necessary we can send it directly to your pharmacy. If lab work is needed we can place an order for that and you can then stop by our lab to have the test done at a later time. Insurers are generally covering virtual visits as they would in-office visits so billing should not be different than normal.  If for some reason you do get billed incorrectly, you should contact the billing office to correct it and that number is in the AVS .    Patient has given verbal consent for video visit?: Yes   How would you like to obtain your AVS?: [employer]  AVS SmartPhrase [PsychAVS] has been placed in 'Patient Instructions': Yes      Video- Visit Details  Type of service:  video visit for medication management  Time of service:    Date:  05/06/2022    Video Start Time:  8:02 AM        Video End Time:  8:50 am    Reason for video visit:  Patient has requested telehealth visit  Originating Site (patient location):  [facility]   Location- Patient's home  Distant Site (provider location):  [facility]  Mode of Communication:  Video Conference via [facility]  Consent:  Patient has given verbal consent for video visit?: Yes        [facility]  Psychiatry Clinic  MEDICAL PROGRESS NOTE     CARE TEAM:  PCP- Physician No Ref-Primary, Psychotherapist- None,     Lux Bailey is a 26 year old who prefers the name Will and uses pronouns he, him, himself.      DIAGNOSIS   Bipolar Disorder type 1, most recent episode manic, severe, with psychosis, in full remission  ADHD, inattentive type   Obessive compulsive symptoms, r/o OCD     ASSESSMENT " "  Today:   Regino reports his mood is \"pretty good\" with improved depression and minimal anxiety.  He reports sertraline has been effective in improving his mood.  He notes ongoing low libido and wants to pursue improved exercise and weight loss goals to address this.  Will states Concerta at its current dose is not working well but the prior trial of 54 mg daily was helpful but too high.  He reports poor concentration and focus during the afternoon that is impairing.  He is amenable to adding methylphenidate at a smaller dose daily.  Regarding sleep he describes it as \"perfect\" getting approximately 7 hours per night with few wake ups.  He notes ongoing dry mouth in the morning and has a follow-up appointment for evaluation of sleep apnea next month.  He reports a moderate stress level that is manageable attributed to moving in with his girlfriend in August and family discord.  He denies new health concerns and his weight has been stable.  Therapy remains pending due to insurance approval and he denies manic symptoms.    Given Aguilar improved mood, reported poor focus and concentration in the afternoon, previous benefit from higher doses methylphenidate, absence of any marline symptoms since 2019, and desire to try a higher dose of methylphenidate, starting methylphenidate 5 mg daily in the afternoons is indicated.  We discussed potential treatments for low libido while on SSRIs and will elected to pursue his weight loss and fitness goals instead of making medication changes.  Will plans to engage in psychotherapy once his insurance deductible is met for the year and plans to follow up with sleep medicine next month.  He reports having an appointment with the lab next week to obtain his lithium labs.      Future considerations: Continue titration of lithium pending lithium level.  Consider trazodone or mirtazapine as alternative antidepressant medication.  Consider initiation of PDE 5 inhibitor if erectile dysfunction " "is a concern again. Consider VPA and re-evaluate other SGA trials.     [place]NPMP was checked today:  Indicates taking controlled medication as prescribed.     PLAN                                                                                                                1) Meds-  - Continue Concerta 36 mg daily  - Continue lithium 300 mg BID  - Start methylphenidate 5 mg daily at 2 PM  - Continue sertraline 50 mg daily.    Non prescribed medications:  - magnesium supplement and daily multivitamin qhs    2) Psychotherapy- Interested in therapy for ADHD and eating disorder history. Referral placed on 10/2021. Pending insurance approval    3) Next due-  Labs- Lithium level pending, ordered on 7/26/2021.  Patient encouraged to obtain labs today. Has a lab appointment scheduled for next Monday.   EKG- As needed  Rating scales-PHQ 9      4) Referrals-  None     5) Dispo- Return to clinic in 4 weeks.      PERTINENT BACKGROUND                           [most recent eval 07/23/21]   Previous diagnoses include depression, schizophrenia 2016 when starting college and ADHD 2017. Was initially diagnosed with schizophrenia and placed on antipsychotic for 2 years. In 2018 diagnosis changed from schizophrenia to bipolar disorder and placed on lamotrigine. Lamotrigine was not effective and transitioned to lithium at end of 2018. Mood has been stable with no manic episodes since starting lithium. Mood symptoms have been well managed at subtherapeutic lithium levels. He recalls he grew up in a family where \"mental illness wasn't real\" but struggled with depression starting in 1st or 2nd grade, \"regularly had teachers pull me aside and ask if everything was OK at home.\" He reports a history of chronic suicidal ideation since 3rd grade, particularly when depressed. He has never \"actually attempted it.\" Has also had intrusive thoughts \"all the time\" about hurting or killing himself, despite not wanting to self-harm or having ever done " "it. Episodes began to get worse and worse and he \"played the 'things are going to get better when I get to the next stage of life' game.\" He first started medications at the beginning of college, when he had his first manic episode, likely triggered by many factors including lack of sleep , along with stress. Has not had a manic episode since he started lithium about 2018/2019.  In college he got care through the university clinic. Then he was working with [facility] for some time, and had a psychiatrist that advised him to immediately go off all his mood stabilizers, which made him concerned and he wanted a second opinion. After transferring care to [facility] he subsequently self discontinued venlafaxine due to affective flattening.    Psych pertinent item history includes suicidal ideation, mutiple psychotropic trials , psych hosp (x1, but several psych ER visits) and substance use: alcohol, cannabis and hallucinogens     SUBJECTIVE   Since the last visit:   Mood: \"pretty good\" reduced depression and minimal anxiety   Meds: antidepressants are working well. Low libido ongoing. Declines medication changes regarding low libido. Reports concerta not working well and prior 54 dose was too high. Amenable to adding 5 mg methylphenidate.   Sleep: \"perfect\". Follow up about sleep apnea, has an appointment pending in July. 7 hrs per night. Dry mouth in the morning.   Stress: \"alright\" difficult things happening in family, managing it well.  Moving in with girlfriend in August.   Health: Weight is stable. Working on weight loss.   Therapy: Pending insurance approval.   Maria M: Denies    RECENT PSYCH ROS:   Depression:  depressed mood  Elevated:  none  Psychosis:  none  Anxiety:  none  Trauma Related:  Nightmares, not bothersome  Sleep: yes  Other: N/A    Adverse Effects: none   Pertinent Negative Symptoms: No violent ideation, psychosis, hallucinations or maria m, hypomania  Recent Substance Use:     Alcohol- none  Tobacco- " "none  Caffeine- none   Cannabis- none     Opioids- none           Narcan Kit- n/a   Other illicit drugs- none, no use in 2 years    PSYCH and SUBSTANCE USE Critical Summary Points since July 2021 6/18/21: Stopped effexor on own  7/26/21: Lithium increased to 750 mg for 2 weeks then to 900 mg if well-tolerated.  8/23/21: Lithium return to 600 mg daily.  Cariprazine 1.5 mg initiated  10/4/21: Concerta increased to 54 mg daily. Lithium self-discontinued  11/29/21: Lithium resumed at 300 mg qam for 14 days, then increase to 300 BID. Concerta decreased to 36 mg daily.  1/3/22: No changes  3/4/22: Start sertraline 50 mg daily  4/1/22: No medication changes, sleep medicine referral placed  5/6/22: Start methylphenidate 5 mg daily at 2 PM  Past Medication Trials        Medication Max Dose (mg) Dates / Duration Helpful? DC Reason / Adverse Effects?   lamotrigine    Lack of efficacy   lithium 900 current Y [place] \"on edge\" at higher doses (900), severe lethargy and memory impairment at doses above 600 mg. Despite side effects works better than other mood stabilizer trials.   bupropion    Maria M, while on lithium   escitalopram    Maria M, while on lithium, OCD   atomoxetine   N Hot flashes, 1 month trial   Adderall   0475-9206 Y \"I hated it\" moderate efficacy   lisdexamfetamine   N Lack of efficacy   dexadrine       lurasidone    Somnolence, cognitive slowing   methylphenidate 54 current Y Wears off quick, emotional blunting and low libido.    venlafaxine  150  5393-9125 Y  initially helpful, discontinued due to affect of flattening and emotional blunting.   cariprazine  1.5  2021 to current Y    risperidone    Cognitive slowing   aripirazole    Tremors, cognitive slowing   ziprazidone    Cognitive slowing   olanzapine    Cognitive slowing   cariprazine 1.5 2021-current Y Weight gain      MEDICAL HISTORY and ALLERGY     ALLERGIES: Patient has no known allergies.    There is no problem list on file for this patient.       " "MEDICAL REVIEW OF SYSTEMS   Contraception- vasectomy    A comprehensive review of systems was performed and is negative other than noted in the HPI.     MEDICATIONS     Current Outpatient Medications   Medication Sig Dispense Refill     lithium ER (LITHOBID) 300 MG CR tablet Take 1 tablet (300 mg) by mouth 2 times daily 60 tablet 1     methylphenidate (CONCERTA) 36 MG CR tablet Take 1 tablet (36 mg) by mouth every morning 30 tablet 0     sertraline (ZOLOFT) 50 MG tablet Take 1 tablet (50 mg) by mouth daily 30 tablet 1      VITALS   There were no vitals taken for this visit.    MENTAL STATUS EXAM   Alertness: alert   Appearance: adequately groomed  Behavior/Demeanor: cooperative and calm, with good  eye contact   Speech: normal and regular rate and rhythm  Language: intact and no problems  Psychomotor: normal or unremarkable  Mood: \"Pretty good\"  Affect: blunted; congruent to: mood- yes, content- yes  Thought Process/Associations: unremarkable  Thought Content:  Reports none;  Denies suicidal & violent ideation and delusions  Perception:  Reports none;  Denies hallucinations  Insight: good  Judgment: good  Cognition: does  appear grossly intact; formal cognitive testing was not done  Gait and Station: N/A (telehealth)     LABS and DATA     PHQ 5/14/2021 6/18/2021 1/3/2022   PHQ-9 Total Score 21 15 6   Q9: Thoughts of better off dead/self-harm past 2 weeks Nearly every day More than half the days Not at all   F/U: Thoughts of suicide or self-harm Yes Yes -   F/U: Self harm-plan No No -   F/U: Self-harm action No No -   F/U: Safety concerns No No -         Recent Labs   Lab Test 06/02/21 0827   LITHIUM 0.42*     Recent Labs   Lab Test 06/02/21 0827   CR 1.14   GFRESTIMATED 90      POTASSIUM 4.1   JODI 9.2     Recent Labs   Lab Test 06/02/21 0827   SG 1.025     Recent Labs   Lab Test 06/02/21 0827   TSH 0.98     Recent Labs   Lab Test 06/02/21 0827   GLC 91     No lab results found.  Recent Labs   Lab Test " 06/02/21  0827   AST 28   ALT 49   ALKPHOS 90     Recent Labs   Lab Test 06/02/21  0827   WBC 5.6   ANEU 3.2   HGB 17.2          ECG: None on file     PSYCHOTROPIC DRUG INTERACTIONS     Additive serotonin syndrome: Lithium, sertraline    MANAGEMENT:  Monitoring for adverse effects, routine vitals and routine labs     RISK STATEMENT for SAFETY   Lux Bailey did not appear to be an imminent safety risk to self or others.    TREATMENT RISK STATEMENT: The risks, benefits, alternatives and potential adverse effects have been discussed and are understood by the pt. The pt understands the risks of using street drugs or alcohol. There are no medical contraindications, the pt agrees to treatment with the ability to do so. The pt knows to call the clinic for any problems or to access emergency care if needed.  Medical and substance use concerns are documented above.  Psychotropic drug interaction check was done, including changes made today.     PROVIDER: Lupillo Petty MD    Patient not staffed in clinic.  Note will be reviewed and signed by supervisor Dr. Lozano.      Level of Medical Decision Making:   - At least 1 chronic problem that is not stable  - Engaged in prescription drug management during visit (discussed any medication benefits, side effects, alternatives, etc.)

## 2022-05-22 ENCOUNTER — HEALTH MAINTENANCE LETTER (OUTPATIENT)
Age: 27
End: 2022-05-22

## 2022-05-24 ENCOUNTER — OFFICE VISIT (OUTPATIENT)
Dept: URGENT CARE | Facility: URGENT CARE | Age: 27
End: 2022-05-24
Payer: COMMERCIAL

## 2022-05-24 VITALS
HEART RATE: 72 BPM | DIASTOLIC BLOOD PRESSURE: 81 MMHG | WEIGHT: 242 LBS | SYSTOLIC BLOOD PRESSURE: 135 MMHG | TEMPERATURE: 98.1 F | OXYGEN SATURATION: 98 %

## 2022-05-24 DIAGNOSIS — F31.70 BIPOLAR I DISORDER IN REMISSION (H): ICD-10-CM

## 2022-05-24 DIAGNOSIS — K12.2 UVULITIS: Primary | ICD-10-CM

## 2022-05-24 DIAGNOSIS — R07.0 THROAT PAIN: ICD-10-CM

## 2022-05-24 LAB
BASOPHILS # BLD AUTO: 0 10E3/UL (ref 0–0.2)
BASOPHILS NFR BLD AUTO: 0 %
DEPRECATED S PYO AG THROAT QL EIA: NEGATIVE
EOSINOPHIL # BLD AUTO: 0.3 10E3/UL (ref 0–0.7)
EOSINOPHIL NFR BLD AUTO: 3 %
ERYTHROCYTE [DISTWIDTH] IN BLOOD BY AUTOMATED COUNT: 12.7 % (ref 10–15)
GROUP A STREP BY PCR: NOT DETECTED
HCT VFR BLD AUTO: 50.4 % (ref 40–53)
HGB BLD-MCNC: 17.4 G/DL (ref 13.3–17.7)
IMM GRANULOCYTES # BLD: 0 10E3/UL
IMM GRANULOCYTES NFR BLD: 0 %
LITHIUM SERPL-SCNC: 0.5 MMOL/L
LYMPHOCYTES # BLD AUTO: 2.4 10E3/UL (ref 0.8–5.3)
LYMPHOCYTES NFR BLD AUTO: 32 %
MCH RBC QN AUTO: 28.1 PG (ref 26.5–33)
MCHC RBC AUTO-ENTMCNC: 34.5 G/DL (ref 31.5–36.5)
MCV RBC AUTO: 81 FL (ref 78–100)
MONOCYTES # BLD AUTO: 0.4 10E3/UL (ref 0–1.3)
MONOCYTES NFR BLD AUTO: 6 %
NEUTROPHILS # BLD AUTO: 4.2 10E3/UL (ref 1.6–8.3)
NEUTROPHILS NFR BLD AUTO: 58 %
PLATELET # BLD AUTO: 209 10E3/UL (ref 150–450)
RBC # BLD AUTO: 6.2 10E6/UL (ref 4.4–5.9)
WBC # BLD AUTO: 7.3 10E3/UL (ref 4–11)

## 2022-05-24 PROCEDURE — 87651 STREP A DNA AMP PROBE: CPT | Performed by: NURSE PRACTITIONER

## 2022-05-24 PROCEDURE — 99204 OFFICE O/P NEW MOD 45 MIN: CPT | Performed by: NURSE PRACTITIONER

## 2022-05-24 PROCEDURE — 80178 ASSAY OF LITHIUM: CPT | Performed by: NURSE PRACTITIONER

## 2022-05-24 PROCEDURE — 36415 COLL VENOUS BLD VENIPUNCTURE: CPT | Performed by: NURSE PRACTITIONER

## 2022-05-24 PROCEDURE — 85025 COMPLETE CBC W/AUTO DIFF WBC: CPT | Performed by: NURSE PRACTITIONER

## 2022-05-24 NOTE — PROGRESS NOTES
Chief Complaint   Patient presents with     Urgent Care     Pharyngitis     C/O sore throat for 2 days     SUBJECTIVE:  Lux Bailey is a 26 year old male presenting with a severe large red uvula for 2 days. There is also black and white spots on the uvula. He declines fever, shortness of breath, drooling, fatigue, trismus. No concerns for mono, chlamydia, gonorrhea, covid, flu.    No past medical history on file.  lithium ER (LITHOBID) 300 MG CR tablet, Take 1 tablet (300 mg) by mouth 2 times daily  [START ON 6/3/2022] methylphenidate (CONCERTA) 36 MG CR tablet, Take 1 tablet (36 mg) by mouth every morning  methylphenidate (RITALIN) 5 MG tablet, Take 1 tablet (5 mg) by mouth daily at 2 pm  sertraline (ZOLOFT) 50 MG tablet, Take 1 tablet (50 mg) by mouth daily    No current facility-administered medications on file prior to visit.    Social History     Tobacco Use     Smoking status: Never Smoker     Smokeless tobacco: Never Used   Substance Use Topics     Alcohol use: Not on file     No Known Allergies    Review of Systems   All systems negative except for those listed above in HPI.    OBJECTIVE:   /81   Pulse 72   Temp 98.1  F (36.7  C) (Tympanic)   Wt 109.8 kg (242 lb)   SpO2 98%      Physical Exam  Vitals reviewed.   Constitutional:       General: He is not in acute distress.     Appearance: Normal appearance. He is not ill-appearing, toxic-appearing or diaphoretic.   HENT:      Head: Normocephalic and atraumatic.      Nose: Nose normal.      Mouth/Throat:      Mouth: Mucous membranes are moist.      Pharynx: Oropharyngeal exudate and posterior oropharyngeal erythema present.   Eyes:      Extraocular Movements: Extraocular movements intact.      Conjunctiva/sclera: Conjunctivae normal.      Pupils: Pupils are equal, round, and reactive to light.   Cardiovascular:      Rate and Rhythm: Normal rate.   Pulmonary:      Effort: Pulmonary effort is normal.   Musculoskeletal:         General: Normal  range of motion.      Cervical back: Normal range of motion and neck supple.   Skin:     General: Skin is warm and dry.      Findings: No rash.   Neurological:      General: No focal deficit present.      Mental Status: He is alert and oriented to person, place, and time.   Psychiatric:         Mood and Affect: Mood normal.         Behavior: Behavior normal.       Results for orders placed or performed in visit on 05/24/22   Streptococcus A Rapid Screen w/Reflex to PCR - Clinic Collect     Status: Normal    Specimen: Throat; Swab   Result Value Ref Range    Group A Strep antigen Negative Negative     ASSESSMENT:    ICD-10-CM    1. Uvulitis  K12.2 CBC with platelets and differential     amoxicillin-clavulanate (AUGMENTIN) 875-125 MG tablet   2. Throat pain  R07.0 Streptococcus A Rapid Screen w/Reflex to PCR - Clinic Collect     Group A Streptococcus PCR Throat Swab     CBC with platelets and differential     PLAN:     Uvulitis may be viral versus bacterial  CBC pending, will call if abnormal  Augmentin on hold at pharmacy if worse in 1-2 days  Cepacol, salt water, tylenol, ibuprofen  ER if severe worsening, shortness of breath, fever, cannot open mouth wide    Follow up with primary care provider with any problems, questions or concerns or if symptoms worsen or fail to improve. Patient agreed to plan and verbalized understanding.    Melvina Park, HIRAM-BC  [facility]

## 2022-06-17 ENCOUNTER — VIRTUAL VISIT (OUTPATIENT)
Dept: PSYCHIATRY | Facility: CLINIC | Age: 27
End: 2022-06-17
Attending: PSYCHIATRY & NEUROLOGY
Payer: COMMERCIAL

## 2022-06-17 DIAGNOSIS — F90.0 ATTENTION DEFICIT HYPERACTIVITY DISORDER (ADHD), PREDOMINANTLY INATTENTIVE TYPE: ICD-10-CM

## 2022-06-17 DIAGNOSIS — F31.70 BIPOLAR I DISORDER IN REMISSION (H): Primary | ICD-10-CM

## 2022-06-17 PROCEDURE — 99214 OFFICE O/P EST MOD 30 MIN: CPT | Mod: TEL | Performed by: STUDENT IN AN ORGANIZED HEALTH CARE EDUCATION/TRAINING PROGRAM

## 2022-06-17 RX ORDER — LITHIUM CARBONATE 300 MG/1
300 TABLET, FILM COATED, EXTENDED RELEASE ORAL 2 TIMES DAILY
Qty: 180 TABLET | Refills: 0 | Status: SHIPPED | OUTPATIENT
Start: 2022-06-17 | End: 2022-07-27

## 2022-06-17 RX ORDER — METHYLPHENIDATE HYDROCHLORIDE 36 MG/1
36 TABLET ORAL EVERY MORNING
Qty: 30 TABLET | Refills: 0 | Status: SHIPPED | OUTPATIENT
Start: 2022-07-04 | End: 2022-07-25

## 2022-06-17 RX ORDER — METHYLPHENIDATE HYDROCHLORIDE 5 MG/1
5 TABLET ORAL DAILY
Qty: 30 TABLET | Refills: 0 | Status: SHIPPED | OUTPATIENT
Start: 2022-07-04 | End: 2022-07-25

## 2022-06-17 NOTE — PATIENT INSTRUCTIONS
**For crisis resources, please see the information at the end of this document**   Patient Education    Thank you for coming to the [facility] [place] [facility].     Lab Testing:  If you had lab testing today and your results are reassuring or normal they will be mailed to you or sent through [employer] within 7 days. If the lab tests need quick action we will call you with the results. The phone number we will call with results is # [phone]. If this is not the best number please call our clinic and change the number.     Medication Refills:  If you need any refills please call your pharmacy and they will contact us. Our fax number for refills is [phone].   Three business days of notice are needed for general medication refill requests.   Five business days of notice are needed for controlled substance refill requests.   If you need to change to a different pharmacy, please contact the new pharmacy directly. The new pharmacy will help you get your medications transferred.     Contact Us:  Please call [phone] during business hours (8-5:00 M-F).   If you have medication related questions after clinic hours, or on the weekend, please call [phone].     Financial Assistance [phone]   Medical Records [phone]       MENTAL HEALTH CRISIS RESOURCES:  For a emergency help, please call 911 or go to the nearest Emergency Department.     Emergency Walk-In Options:   EmPATH Unit @ [facility] ([place]): [phone] - Specialized mental health emergency area designed to be calming  [facility] ([place]): [phone]  [facility] ([place]): [phone]  [facility]): [phone]    [facility] Crisis Information:   [place]: [phone]  [place]: [phone]  [place] (ATILIO) - Adult: [phone]     Child: [phone]  [place] - Adult: [phone]     Child: [phone]  [place]:  [phone]  List of all [facility] resources:   https://[place].gov/dhs/people-we-serve/adults/health-care/mental-health/resources/crisis-contacts.jsp    National Crisis Information:   Crisis Text Line: Text  [place]  to 122771  National Suicide Prevention Lifeline: 6-269-692-TALK (1-215.493.7242)       For online chat options, visit https://suicidepreventionlifeline.org/chat/  Poison Control Center: 1-593.574.2879  Trans Lifeline: 1-307.800.5578 - Hotline for transgender people of all ages  The [employer] Project: 1-207-900-3361 - Hotline for LGBT youth     For Non-Emergency Support:   Fast Tracker: Mental Health & Substance Use Disorder Resources -   https://www.[employer]om[employer] controln.org/      Treatment Plan Today:     1) Medications-no medication changes, continue current medications as prescribed    2) Follow-up appt with next scheduled provider in 6 weeks    3) Crisis numbers are below and clinic after hours number is [phone]

## 2022-06-17 NOTE — PROGRESS NOTES
Lux Bailey is a 26 year old who has consented to receive services via billable video visit.      Pt will join video visit via: [place]  If there are problems joining the visit, send backup video invite via: Send to preferred e-mail: [email or web address]      Originating Location (patient location): Patient's home  Distant Location (provider location): [facility] [place] [facility]    Will anyone else be joining the video visit? No    How would you prefer to obtain AVS?: [place]

## 2022-06-17 NOTE — PROGRESS NOTES
TELEPHONE VISIT  Lux Bailey is a 26 year old pt. who is being evaluated via a billable telephone visit.      The patient has been notified of the following:    We have found that certain health care needs can be provided without the need for a physical exam. This service lets us provide the care you need with a short phone conversation. If a prescription is necessary we can send it directly to your pharmacy. If lab work is needed we can place an order for that and you can then stop by our lab to have the test done at a later time. Insurers are generally covering virtual visits as they would in-office visits so billing should not be different than normal.  If for some reason you do get billed incorrectly, you should contact the billing office to correct it and that number is in the AVS .    Patient has given verbal consent for a telephone visit?:  Yes   How would the pt like to obtain the AVS?:  [email or web address]  AVS SmartPhrase [PsychAVS] has been placed in 'Patient Instructions':  Yes     Start Time:  8:16 am          End Time:  8:38 am       [facility]  Psychiatry Clinic  MEDICAL PROGRESS NOTE     CARE TEAM:  PCP- Physician No Ref-Primary, Psychotherapist- None,     Lux Bailey is a 26 year old who prefers the name Regino and uses pronouns he, him, himself.      DIAGNOSIS   Bipolar Disorder type 1, most recent episode manic, severe, with psychosis, in full remission  ADHD, inattentive type   Obessive compulsive symptoms, r/o OCD     ASSESSMENT   Today:   Given Regino's stable mood, adequate sleep, absence of reported side effects, reduced psychosocial stressors and desire to maintain his current medication regimen, no medication changes are indicated today.  We discussed [place] subtherapeutic lithium level, which was 0.5 on 5/24/2022 along with concurrent use of stimulant medications placed him at elevated risk for marline.  Will reports he has a history of good management of his mood  symptoms, no manic episodes since 2018 despite subtherapeutic lithium levels and declined to increase his lithium dose today.  Will agreed to notify his care team if he experiences reduced need for sleep, insomnia, racing thoughts, or significant changes in his mood.  He denied suicidal ideation, or safety concerns today.    Future considerations: History of stable mood despite subtherapeutic lithium levels. Consider increasing lithium given concurrent stimulant prescriptions. Consider trazodone or mirtazapine as alternative antidepressant medication.  Consider initiation of PDE 5 inhibitor if erectile dysfunction is a concern again. Consider VPA and re-evaluate other SGA trials.     [place]NPMP was checked today:  Indicates taking controlled medication as prescribed.     PLAN                                                                                                                1) Meds-  - Continue Concerta 36 mg daily  - Continue lithium 300 mg BID  - Continue methylphenidate 5 mg daily at 2 PM   - Continue sertraline 50 mg daily.    Non prescribed medications:  - magnesium supplement and daily multivitamin qhs    2) Psychotherapy- Interested in therapy for ADHD and eating disorder history. Referral placed on 10/2021. Pending insurance approval    3) Next due-  Labs- Lithium level of 0.5 on 5/24/22. Repeat lithium due on 11/2022  EKG- As needed  Rating scales-PHQ 9      4) Referrals-  None     5) Dispo- Return to clinic in 6 weeks for an in person visit     PERTINENT BACKGROUND                           [most recent eval 07/23/21]   Previous diagnoses include depression, schizophrenia 2016 when starting college and ADHD 2017. Was initially diagnosed with schizophrenia and placed on antipsychotic for 2 years. In 2018 diagnosis changed from schizophrenia to bipolar disorder and placed on lamotrigine. Lamotrigine was not effective and transitioned to lithium at end of 2018. Mood has been stable with no manic  "episodes since starting lithium. Mood symptoms have been well managed at subtherapeutic lithium levels. He recalls he grew up in a family where \"mental illness wasn't real\" but struggled with depression starting in 1st or 2nd grade, \"regularly had teachers pull me aside and ask if everything was OK at home.\" He reports a history of chronic suicidal ideation since 3rd grade, particularly when depressed. He has never \"actually attempted it.\" Has also had intrusive thoughts \"all the time\" about hurting or killing himself, despite not wanting to self-harm or having ever done it. Episodes began to get worse and worse and he \"played the 'things are going to get better when I get to the next stage of life' game.\" He first started medications at the beginning of college, when he had his first manic episode, likely triggered by many factors including lack of sleep , along with stress. Has not had a manic episode since he started lithium about 2018/2019.  In college he got care through the university clinic. Then he was working with [facility] for some time, and had a psychiatrist that advised him to immediately go off all his mood stabilizers, which made him concerned and he wanted a second opinion. After transferring care to [facility] he subsequently self discontinued venlafaxine due to affective flattening.    Psych pertinent item history includes suicidal ideation, mutiple psychotropic trials , psych hosp (x1, but several psych ER visits) and substance use: alcohol, cannabis and hallucinogens     SUBJECTIVE   Since the last visit:   Mood: \"great\" minimal depression and anxiety that is stable.  Meds: Denies side effects, good adherence, takes ritalin 5 mg 2-3 times a week when having a long day. Declines lithium dose increase despite subtherapeutic level and agreed to notify team mood symptoms worsens.   Sleep: \"perfect\" Has a pending appointment with sleep medicine on 7/11/22. 7-8 hrs of sleep per night. Denies recent nightmares. " "Naps once per week on weekends.   Stress: \"going down\" Family stressors have improved. Doing well at work. Found a new apartment and moving plan is going well.    Health: Appetite and weight are stable.   Therapy: Pending insurance approval. States he is working with insurance   Maria M: Denies    RECENT PSYCH ROS:   Depression:  none  Elevated:  none  Psychosis:  none  Anxiety:  Less anxiety than usual  Trauma Related:  none  Sleep: yes  Other: N/A    Adverse Effects: none   Pertinent Negative Symptoms: No violent ideation, psychosis, hallucinations or maria m, hypomania  Recent Substance Use:     Alcohol- none  Tobacco- none  Caffeine- 1 cup of coffee per day   Cannabis- none     Opioids- none           Narcan Kit- n/a   Other illicit drugs- none, no use in 2 years    PSYCH and SUBSTANCE USE Critical Summary Points since July 2021 6/18/21: Stopped effexor on own  7/26/21: Lithium increased to 750 mg for 2 weeks then to 900 mg if well-tolerated.  8/23/21: Lithium return to 600 mg daily.  Cariprazine 1.5 mg initiated  10/4/21: Concerta increased to 54 mg daily. Lithium self-discontinued  11/29/21: Lithium resumed at 300 mg qam for 14 days, then increase to 300 BID. Concerta decreased to 36 mg daily.  1/3/22: No changes  3/4/22: Start sertraline 50 mg daily  4/1/22: No medication changes, sleep medicine referral placed  5/6/22: Start methylphenidate 5 mg daily at 2 PM  6/17/22: No changes    Past Medication Trials        Medication Max Dose (mg) Dates / Duration Helpful? DC Reason / Adverse Effects?   lamotrigine    Lack of efficacy   lithium 900 current Y [place] \"on edge\" at higher doses (900), severe lethargy and memory impairment at doses above 600 mg. Despite side effects works better than other mood stabilizer trials.   bupropion    Maria M, while on lithium   escitalopram    Maria M, while on lithium, OCD   atomoxetine   N Hot flashes, 1 month trial   Adderall   8565-1430 Y \"I hated it\" moderate efficacy " "  lisdexamfetamine   N Lack of efficacy   dexadrine       lurasidone    Somnolence, cognitive slowing   methylphenidate 54 current Y Wears off quick, emotional blunting and low libido.    venlafaxine  150  4995-4112 Y  initially helpful, discontinued due to affect of flattening and emotional blunting.   cariprazine  1.5  2021 to current Y    risperidone    Cognitive slowing   aripirazole    Tremors, cognitive slowing   ziprazidone    Cognitive slowing   olanzapine    Cognitive slowing   cariprazine 1.5 2021-current Y Weight gain      MEDICAL HISTORY and ALLERGY     ALLERGIES: Patient has no known allergies.    There is no problem list on file for this patient.       MEDICAL REVIEW OF SYSTEMS   Contraception- vasectomy    A comprehensive review of systems was performed and is negative other than noted in the HPI.     MEDICATIONS     Current Outpatient Medications   Medication Sig Dispense Refill     lithium ER (LITHOBID) 300 MG CR tablet Take 1 tablet (300 mg) by mouth 2 times daily 60 tablet 1     methylphenidate (CONCERTA) 36 MG CR tablet Take 1 tablet (36 mg) by mouth every morning 30 tablet 0     sertraline (ZOLOFT) 50 MG tablet Take 1 tablet (50 mg) by mouth daily 30 tablet 1      VITALS   There were no vitals taken for this visit.    MENTAL STATUS EXAM   Alertness: alert   Appearance: N/A (phone visit)  Behavior/Demeanor: cooperative, pleasant and calm, with N/A (phone visit) eye contact   Speech: normal and regular rate and rhythm  Language: intact and no problems  Psychomotor: normal or unremarkable  Mood: \"great\"  Affect: N/A (phone visit);   Thought Process/Associations: unremarkable  Thought Content:  Reports none;  Denies suicidal & violent ideation and delusions  Perception:  Reports none;  Denies hallucinations  Insight: good  Judgment: fair  Cognition: does  appear grossly intact; formal cognitive testing was not done  Gait and Station: N/A (telehealth)     LABS and DATA     PHQ 5/14/2021 6/18/2021 " 1/3/2022   PHQ-9 Total Score 21 15 6   Q9: Thoughts of better off dead/self-harm past 2 weeks Nearly every day More than half the days Not at all   F/U: Thoughts of suicide or self-harm Yes Yes -   F/U: Self harm-plan No No -   F/U: Self-harm action No No -   F/U: Safety concerns No No -         Recent Labs   Lab Test 05/24/22  1159 06/02/21 0827   LITHIUM 0.5 0.42*     Recent Labs   Lab Test 06/02/21 0827   CR 1.14   GFRESTIMATED 90      POTASSIUM 4.1   JODI 9.2     Recent Labs   Lab Test 06/02/21 0827   SG 1.025     Recent Labs   Lab Test 06/02/21 0827   TSH 0.98     Recent Labs   Lab Test 06/02/21 0827   GLC 91     No lab results found.  Recent Labs   Lab Test 06/02/21 0827   AST 28   ALT 49   ALKPHOS 90     Recent Labs   Lab Test 05/24/22  1159 06/02/21 0827   WBC 7.3 5.6   ANEU  --  3.2   HGB 17.4 17.2    201       ECG: None on file     PSYCHOTROPIC DRUG INTERACTIONS     Additive serotonin syndrome: Lithium, sertraline    MANAGEMENT:  Monitoring for adverse effects, routine vitals and routine labs     RISK STATEMENT for SAFETY   did not appear to be an imminent safety risk to self or others.    TREATMENT RISK STATEMENT: The risks, benefits, alternatives and potential adverse effects have been discussed and are understood by the pt. The pt understands the risks of using street drugs or alcohol. There are no medical contraindications, the pt agrees to treatment with the ability to do so. The pt knows to call the clinic for any problems or to access emergency care if needed.  Medical and substance use concerns are documented above.  Psychotropic drug interaction check was done, including changes made today.     PROVIDER: Lupillo Petty MD    Patient not staffed in clinic.  Note will be reviewed and signed by supervisor Dr. Lozano.      Level of Medical Decision Making:   - At least 2 stable chronic problems  - Engaged in prescription drug management during visit (discussed any  medication benefits, side effects, alternatives, etc.)

## 2022-07-24 NOTE — PROGRESS NOTES
[facility]  Psychiatry Clinic  TRANSFER of CARE DIAGNOSTIC ASSESSMENT     CARE TEAM:  PCP- Physician No Ref-Primary, Psychotherapist- None,      Lux Bailey is a 26 year old who prefers the name Will and uses pronouns he, him, himself       DIAGNOSIS     Bipolar disorder type 1, most recent episode manic, severe, with psychosis, in full remission  ADHD, inattentive type   Obsessive compulsive symptoms, r/o OCD     ASSESSMENT     Will is doing well overall. He reports stable mood, adequate sleep, no medication side effects, no major psychosocial stressors, and desire to maintain his current medication regimen. No medication changes are indicated today. He states he has been managing his mood symptoms and no manic episodes since 2018 despite subtherapeutic lithium levels (0.5 on 5/24/2022). He understands that concurrent use of stimulant medications places him at elevated risk for marline.He is not interested in increasing his lithium dose today. He prefers to take his lithium in the morning.     Patient will monitor his symptoms and understands that if a reduced need for sleep, insomnia, racing thoughts, or significant changes in his mood occur he will call the clinic or go to the emergency room. He denied suicidal ideation. No safety concerns today.    Per Dr. Petty's note on 6/17/2022:  Future considerations: History of stable mood despite subtherapeutic lithium levels. Consider increasing lithium given concurrent stimulant prescriptions. Consider trazodone or mirtazapine as alternative antidepressant medication.  Consider initiation of PDE 5 inhibitor if erectile dysfunction is a concern again. Consider VPA and re-evaluate other SGA trials.        [place]NPMP was checked today:  Indicates taking controlled medication as prescribed.     PLAN                                                                                                                1) Meds-  - Continue  "methylphenidate (CONCERTA) tablet 36 mg daily  - Continue lithium 300 mg BID  - Continue methylphenidate 5 mg daily at 2 PM   - Continue sertraline 50 mg daily.     Non prescribed medications:  - Magnesium supplement and daily multivitamin qhs     2) Psychotherapy- Interested in therapy for ADHD and eating disorder history. Referral placed on 10/2021. Pending insurance approval.     3) Next due-  Labs- Lithium level of 0.5 on 5/24/22. Repeat lithium due on 11/2022  EKG- as needed     4) Referrals-  None     5) Dispo- Return to clinic in 6-8 weeks for an in person visit     PERTINENT BACKGROUND                                                    [most recent eval 07/24/22]     Previous diagnoses include depression, schizophrenia 2016 when starting college and ADHD 2017. Was initially diagnosed with schizophrenia and placed on antipsychotic for 2 years. In 2018 diagnosis changed from schizophrenia to bipolar disorder and placed on lamotrigine. Lamotrigine was not effective and transitioned to lithium at end of 2018. Mood has been stable with no manic episodes since starting lithium. Mood symptoms have been well managed at subtherapeutic lithium levels. He recalls he grew up in a family where \"mental illness wasn't real\" but struggled with depression starting in 1st or 2nd grade, \"regularly had teachers pull me aside and ask if everything was OK at home.\" He reports a history of chronic suicidal ideation since 3rd grade, particularly when depressed. He has never \"actually attempted it.\" Has also had intrusive thoughts \"all the time\" about hurting or killing himself, despite not wanting to self-harm or having ever done it. Episodes began to get worse and worse and he \"played the 'things are going to get better when I get to the next stage of life' game.\" He first started medications at the beginning of college, when he had his first manic episode, likely triggered by many factors including lack of sleep , along with " "stress. Has not had a manic episode since he started lithium about 2018/2019.  In college he got care through the university clinic. Then he was working with [facility] for some time, and had a psychiatrist that advised him to immediately go off all his mood stabilizers, which made him concerned and he wanted a second opinion. After transferring care to [facility] he subsequently self discontinued venlafaxine due to affective flattening.     Psych pertinent item history includes suicidal ideation, multiple psychotropic trials , psych hosp (x1, but several psych ER visits) and substance use: alcohol, cannabis and hallucinogens       SUBJECTIVE     Today:  Mood is \"pretty good\"  Moving in w/girlfriend, packing, move day is in August  , working for [employer], plans to go back to school, pursuing medicine, wants to be a psychiatrist  Has some concerns with weight gain  Hx of being bullied when child  No medication side effects, only dry mouth.  Describes feeling \"hangover\" in the morning, he thinks that concerta is wearing off overnight. Feels better after taking it at 7:00 am  Hx of binge eating in college   Denies any marline, depression of ADHD symptoms  No medication changes today.   Discussed the combination tx for ADHD  Denies SI, plan or intent. No safety concerns    Recent Social History: see below    Recent Psych Symptoms:   Depression:  low energy   Elevated:  none  Psychosis:  none  Anxiety:  excessive worry and nervous/overwhelmed  Trauma Related:  none  Sleep: no, \"perfect sleep\" although referred for [facility], waiting for evaluation  Other: no     Adverse Effects: none   Pertinent Negative Symptoms: No violent ideation, psychosis, hallucinations or marline, hypomania    Recent Substance Use:     Alcohol- none  Tobacco- none  Caffeine- 1 cup of coffee per day   Cannabis- none     Opioids- none           Narcan Kit- n/a   Other illicit drugs- none, no use in 2 years    Pertinent Negative Symptoms: No violent " ideation, psychosis, hallucinations or marline, hypomania  Adverse Effects: none    PSYCH and SUBSTANCE USE Critical Summary Points since July 2021        Per Dr. Petty's not from 6/17/2022:    6/18/21: Stopped effexor on own  7/26/21: Lithium increased to 750 mg for 2 weeks then to 900 mg if well-tolerated.  8/23/21: Lithium return to 600 mg daily.  Cariprazine 1.5 mg initiated  10/4/21: Concerta increased to 54 mg daily. Lithium self-discontinued  11/29/21: Lithium resumed at 300 mg qam for 14 days, then increase to 300 BID. Concerta decreased to 36 mg daily.  1/3/22: No changes  3/4/22: Start sertraline 50 mg daily  4/1/22: No medication changes, sleep medicine referral placed  5/6/22: Start methylphenidate 5 mg daily at 2 PM  6/17/22: No changes     FAMILY and SOCIAL HISTORY                                 pt reported     Family Hx:   Father, paternal grandfather with alcoholism.  Maternal grandfather and mother have bipolar disorder.   Brother with ADHD.  Parkinson's and Alzheimer's on both sides of family. Nothing diagnosed in the immediate family.      Social Hx:  Financial/ Work- Working,  for [employer]  Partner/ - girlfriend  Children- None      Living situation- lives in [place] in apartment alone.   Social/ Spiritual Support- friends, girlfriend, family      Feels Safe at Home- yes   Legal- None     Trauma History (self-report)- None    Early History/Education- Born in [place], [place]. Moved often in childhood in [place], [place], [place], [place], [place]. Grew up poor on food stamps during childhood. Did well academically until high school, not many friends until high school. Oldest of 3 brothers. Didn't finish college d/t COVID and related job loss. Dual majored in informatics and math, minors in bio and computer science. 1 semester away from graduation. Lived on own since getting job.     PAST PSYCHIATRIC HISTORY     SIB- None  Suicide Attempt [#, most recent]- None  Suicidal Ideation Hx- yes, near overdose on  "APAP in 2018     Violence/Aggression Hx- None  Psychosis Hx- yes, during maria m, auditory hallucinations  Eating Disorder Hx- yes, restricting, binging in college. Better now.   Other- None     Psych Hosp [#, most recent]- 1, in 2018 for maria m in the context of insomnia and substance use (LSD and ketamine)  Commitment- None  ECT- None  Outpatient Programs - None  Other - N/A     PAST MED TRIALS     Medication Max Dose (mg) Dates / Duration Helpful? DC Reason / Adverse Effects?   lamotrigine       Lack of efficacy   lithium 900 current Y [place] \"on edge\" at higher doses (900), severe lethargy and memory impairment at doses above 600 mg. Despite side effects works better than other mood stabilizer trials.   bupropion       Maria M, while on lithium   escitalopram       Maria M, while on lithium, OCD   atomoxetine     N Hot flashes, nausea. 1 month trial   Adderall    9143-5130 Y \"I hated it\" moderate efficacy   lisdexamfetamine     N Lack of efficacy   dexadrine           lurasidone       Somnolence, cognitive slowing   methylphenidate 54 current Y Wears off quick, emotional blunting and low libido.    venlafaxine  150  3222-7236 Y  initially helpful, discontinued due to affect of flattening and emotional blunting.   cariprazine  1.5  2021 to current Y     risperidone       Cognitive slowing   aripirazole       Tremors, cognitive slowing   ziprazidone       Cognitive slowing   olanzapine       Cognitive slowing   cariprazine 1.5 2021-current Y Weight gain      PAST SUBSTANCE USE HISTORY     Past Use-  Numerous drugs in college, former cannabis use. LSD, ketamine, cannabis, DMT, psylocybin, research chemicals. No opioids or benzodiazapines  Treatment- #, most recent- no  Medical Consequences- no  Legal Consequences- no  Other- N/A     MEDICAL HISTORY and ALLERGY     ALLERGIES: Patient has no known allergies.    There is no problem list on file for this patient.       MEDICAL REVIEW OF SYSTEMS   Contraception- Vasectomy  A " "comprehensive review of systems was performed and is negative other than noted in the HPI.     MEDICATIONS     Current Outpatient Medications   Medication Sig Dispense Refill    lithium ER (LITHOBID) 300 MG CR tablet Take 1 tablet (300 mg) by mouth 2 times daily for 90 days 180 tablet 0    methylphenidate (CONCERTA) 36 MG CR tablet Take 1 tablet (36 mg) by mouth every morning for 30 days 30 tablet 0    methylphenidate (RITALIN) 5 MG tablet Take 1 tablet (5 mg) by mouth daily for 30 days 30 tablet 0    sertraline (ZOLOFT) 50 MG tablet Take 1 tablet (50 mg) by mouth daily for 90 days 90 tablet 0      VITALS   BP (!) 155/96   Pulse 99   Wt 107.8 kg (237 lb 9.6 oz)     MENTAL STATUS EXAM     Alertness: alert  and oriented  Appearance: adequately groomed  Behavior/Demeanor: cooperative, pleasant and calm, with good  eye contact   Speech: normal and regular rate and rhythm  Language: intact and no problems  Psychomotor: normal or unremarkable  Mood: \"pretty good\"  Affect: full range; congruent to: mood- yes, content- yes  Thought Process/Associations: unremarkable  Thought Content:  Reports none;  Denies suicidal & violent ideation and delusions  Perception:  Reports none;  Denies none  Insight: good  Judgment: good  Cognition: does  appear grossly intact; formal cognitive testing was not done  Gait and Station: unremarkable     LABS and DATA     PHQ 5/14/2021 6/18/2021 1/3/2022   PHQ-9 Total Score 21 15 6   Q9: Thoughts of better off dead/self-harm past 2 weeks Nearly every day More than half the days Not at all   F/U: Thoughts of suicide or self-harm Yes Yes -   F/U: Self harm-plan No No -   F/U: Self-harm action No No -   F/U: Safety concerns No No -       Recent Labs   Lab Test 06/02/21  0827   GLC 91     No lab results found.  Recent Labs   Lab Test 06/02/21  0827   AST 28   ALT 49   ALKPHOS 90         Recent Labs   Lab Test 05/24/22  1159 06/02/21  0827   LITHIUM 0.5 0.42*     Recent Labs   Lab Test 06/02/21  0827 "   CR 1.14   GFRESTIMATED 90      POTASSIUM 4.1   JODI 9.2     Recent Labs   Lab Test 06/02/21  0827   SG 1.025     Recent Labs   Lab Test 06/02/21  0827   TSH 0.98     Recent Labs   Lab Test 05/24/22  1159 06/02/21 0827   WBC 7.3 5.6   ANEU  --  3.2      PSYCHOTROPIC DRUG INTERACTIONS                                                       PSYCHCLINICDDI     Additive serotonin syndrome: Lithium, sertraline     MANAGEMENT:  Monitoring for adverse effects, routine vitals and routine labs       RISK STATEMENT for SAFETY     Will did not appear to be an imminent safety risk to self or others.    TREATMENT RISK STATEMENT: The risks, benefits, alternatives and potential adverse effects have been discussed and are understood by the pt. The pt understands the risks of using street drugs or alcohol. There are no medical contraindications, the pt agrees to treatment with the ability to do so. The pt knows to call the clinic for any problems or to access emergency care if needed.  Medical and substance use concerns are documented above.  Psychotropic drug interaction check was done, including changes made today.     PROVIDER: Anastasia Perrin MD, PhD    Patient staffed in clinic with Dr. Calderon who will sign the note.  Supervisor is Dr. Lozano.

## 2022-07-25 ENCOUNTER — OFFICE VISIT (OUTPATIENT)
Dept: PSYCHIATRY | Facility: CLINIC | Age: 27
End: 2022-07-25
Attending: PSYCHIATRY & NEUROLOGY
Payer: COMMERCIAL

## 2022-07-25 VITALS — DIASTOLIC BLOOD PRESSURE: 96 MMHG | WEIGHT: 237.6 LBS | HEART RATE: 99 BPM | SYSTOLIC BLOOD PRESSURE: 155 MMHG

## 2022-07-25 DIAGNOSIS — F31.9 BIPOLAR 1 DISORDER (H): Primary | ICD-10-CM

## 2022-07-25 DIAGNOSIS — F90.0 ATTENTION DEFICIT HYPERACTIVITY DISORDER (ADHD), PREDOMINANTLY INATTENTIVE TYPE: ICD-10-CM

## 2022-07-25 PROCEDURE — 90792 PSYCH DIAG EVAL W/MED SRVCS: CPT | Mod: GC | Performed by: STUDENT IN AN ORGANIZED HEALTH CARE EDUCATION/TRAINING PROGRAM

## 2022-07-25 PROCEDURE — G0463 HOSPITAL OUTPT CLINIC VISIT: HCPCS

## 2022-07-25 RX ORDER — METHYLPHENIDATE HYDROCHLORIDE 36 MG/1
36 TABLET ORAL EVERY MORNING
Qty: 30 TABLET | Refills: 0 | Status: CANCELLED | OUTPATIENT
Start: 2022-07-25

## 2022-07-25 RX ORDER — METHYLPHENIDATE HYDROCHLORIDE 5 MG/1
5 TABLET ORAL DAILY
Qty: 30 TABLET | Refills: 0 | Status: CANCELLED | OUTPATIENT
Start: 2022-07-25

## 2022-07-25 ASSESSMENT — PAIN SCALES - GENERAL: PAINLEVEL: NO PAIN (0)

## 2022-07-25 NOTE — NURSING NOTE
Chief Complaint   Patient presents with     Recheck Medication     Bipolar I disorder in remission (H)

## 2022-07-25 NOTE — Clinical Note
Scot Calderon,  This patient's note is ready for your review and signature.   There are also two pending orders for control substances to sign.  Thank you,  Anastasia

## 2022-07-26 RX ORDER — METHYLPHENIDATE HYDROCHLORIDE 36 MG/1
36 TABLET ORAL EVERY MORNING
Qty: 30 TABLET | Refills: 0 | Status: SHIPPED | OUTPATIENT
Start: 2022-08-05 | End: 2022-09-06

## 2022-07-26 RX ORDER — METHYLPHENIDATE HYDROCHLORIDE 5 MG/1
5 TABLET ORAL DAILY
Qty: 30 TABLET | Refills: 0 | Status: SHIPPED | OUTPATIENT
Start: 2022-08-05 | End: 2022-09-06

## 2022-07-26 NOTE — PATIENT INSTRUCTIONS
**For crisis resources, please see the information at the end of this document**   Patient Education    Thank you for coming to the [facility] [place] [facility].     Lab Testing:  If you had lab testing today and your results are reassuring or normal they will be mailed to you or sent through [employer] within 7 days. If the lab tests need quick action we will call you with the results. The phone number we will call with results is # [phone]. If this is not the best number please call our clinic and change the number.     Medication Refills:  If you need any refills please call your pharmacy and they will contact us. Our fax number for refills is [phone].   Three business days of notice are needed for general medication refill requests.   Five business days of notice are needed for controlled substance refill requests.   If you need to change to a different pharmacy, please contact the new pharmacy directly. The new pharmacy will help you get your medications transferred.     Contact Us:  Please call [phone] during business hours (8-5:00 M-F).   If you have medication related questions after clinic hours, or on the weekend, please call [phone].     Financial Assistance [phone]   Medical Records [phone]       MENTAL HEALTH CRISIS RESOURCES:  For a emergency help, please call 911 or go to the nearest Emergency Department.     Emergency Walk-In Options:   EmPATH Unit @ [facility] ([place]): [phone] - Specialized mental health emergency area designed to be calming  [facility] [place] ([place]): [phone]  [facility] ([place]): [phone]  [facility]): [phone]    [facility] Crisis Information:   [place]: [phone]  [place]: [phone]  [place] (ATILIO) - Adult: [phone]     Child: [phone]  [place] - Adult: [phone]     Child: [phone]  [place]:  [phone]  List of all [facility] resources:   https://[place].gov/dhs/people-we-serve/adults/health-care/mental-health/resources/crisis-contacts.jsp    National Crisis Information:   Crisis Text Line: Text  [place]  to 076698  National Suicide Prevention Lifeline: 2-928-660-TALK (1-260.139.3543)       For online chat options, visit https://suicidepreventionlifeline.org/chat/  Poison Control Center: 0-984-985-1545  Trans Lifeline: 5-401-125-7754 - Hotline for transgender people of all ages  The [employer] Project: 3-397-757-6709 - Hotline for LGBT youth     For Non-Emergency Support:   Fast Tracker: Mental Health & Substance Use Disorder Resources -   https://www.[employer] softwaren.org/

## 2022-07-27 RX ORDER — LITHIUM CARBONATE 300 MG/1
300 TABLET, FILM COATED, EXTENDED RELEASE ORAL 2 TIMES DAILY
Qty: 180 TABLET | Refills: 0 | Status: SHIPPED | OUTPATIENT
Start: 2022-07-27 | End: 2022-09-06

## 2022-08-09 ENCOUNTER — MYC REFILL (OUTPATIENT)
Dept: PSYCHIATRY | Facility: CLINIC | Age: 27
End: 2022-08-09

## 2022-08-09 ENCOUNTER — TELEPHONE (OUTPATIENT)
Dept: PSYCHIATRY | Facility: CLINIC | Age: 27
End: 2022-08-09

## 2022-08-09 DIAGNOSIS — F31.70 BIPOLAR I DISORDER IN REMISSION (H): ICD-10-CM

## 2022-08-09 DIAGNOSIS — F90.0 ATTENTION DEFICIT HYPERACTIVITY DISORDER (ADHD), PREDOMINANTLY INATTENTIVE TYPE: ICD-10-CM

## 2022-08-09 RX ORDER — METHYLPHENIDATE HYDROCHLORIDE 36 MG/1
36 TABLET ORAL EVERY MORNING
Qty: 30 TABLET | Refills: 0 | Status: CANCELLED | OUTPATIENT
Start: 2022-08-09

## 2022-08-09 RX ORDER — METHYLPHENIDATE HYDROCHLORIDE 5 MG/1
5 TABLET ORAL DAILY
Qty: 30 TABLET | Refills: 0 | Status: CANCELLED | OUTPATIENT
Start: 2022-08-09

## 2022-08-09 RX ORDER — LITHIUM CARBONATE 300 MG/1
300 TABLET, FILM COATED, EXTENDED RELEASE ORAL 2 TIMES DAILY
Qty: 180 TABLET | Refills: 0 | Status: CANCELLED | OUTPATIENT
Start: 2022-08-09

## 2022-08-09 NOTE — TELEPHONE ENCOUNTER
Upon chart review, it appears that all requested medications were sent to pharmacy on 7/26 and 7/27. Writer called patient to update and he states they initially told him that there were not any refills available for him. He states that he will contact the pharmacy again and call the clinic back if there are any issues.

## 2022-08-09 NOTE — TELEPHONE ENCOUNTER
Health Call Center    Phone Message    May a detailed message be left on voicemail: yes     Reason for Call: Medication Refill Request    Has the patient contacted the pharmacy for the refill? Yes     Name of medication being requested: Ritalin, Concerta, Lithobid, Zoloft    Provider who prescribed the medication: Aydin    Pharmacy:    [id number] - [place], [place] - [id number]    Date medication is needed: ASAP - pt is completely out.  He also sent a [employer] request to Dr Calderon.  He stated he was not able to send Dr Perrin a msg directly through [employer] as she is not yet listed as his provider.      Action Taken: Other: Nursing pool    Travel Screening: Not Applicable

## 2022-09-06 ENCOUNTER — OFFICE VISIT (OUTPATIENT)
Dept: PSYCHIATRY | Facility: CLINIC | Age: 27
End: 2022-09-06
Attending: PSYCHIATRY & NEUROLOGY
Payer: COMMERCIAL

## 2022-09-06 VITALS — WEIGHT: 233.2 LBS | SYSTOLIC BLOOD PRESSURE: 134 MMHG | HEART RATE: 116 BPM | DIASTOLIC BLOOD PRESSURE: 90 MMHG

## 2022-09-06 DIAGNOSIS — F90.0 ATTENTION DEFICIT HYPERACTIVITY DISORDER (ADHD), PREDOMINANTLY INATTENTIVE TYPE: Primary | ICD-10-CM

## 2022-09-06 DIAGNOSIS — F31.70 BIPOLAR I DISORDER IN REMISSION (H): ICD-10-CM

## 2022-09-06 PROCEDURE — G0463 HOSPITAL OUTPT CLINIC VISIT: HCPCS

## 2022-09-06 PROCEDURE — 99214 OFFICE O/P EST MOD 30 MIN: CPT | Mod: GC | Performed by: STUDENT IN AN ORGANIZED HEALTH CARE EDUCATION/TRAINING PROGRAM

## 2022-09-06 RX ORDER — METHYLPHENIDATE HYDROCHLORIDE 36 MG/1
36 TABLET ORAL EVERY MORNING
Qty: 30 TABLET | Refills: 0 | Status: SHIPPED | OUTPATIENT
Start: 2022-09-06 | End: 2022-10-11

## 2022-09-06 RX ORDER — METHYLPHENIDATE HYDROCHLORIDE 5 MG/1
5 TABLET ORAL DAILY
Qty: 30 TABLET | Refills: 0 | Status: SHIPPED | OUTPATIENT
Start: 2022-09-06 | End: 2022-10-11 | Stop reason: DRUGHIGH

## 2022-09-06 RX ORDER — LITHIUM CARBONATE 300 MG/1
300 TABLET, FILM COATED, EXTENDED RELEASE ORAL 2 TIMES DAILY
Qty: 60 TABLET | Refills: 2 | Status: SHIPPED | OUTPATIENT
Start: 2022-09-06 | End: 2022-10-11

## 2022-09-06 ASSESSMENT — PAIN SCALES - GENERAL: PAINLEVEL: MILD PAIN (3)

## 2022-09-06 NOTE — PROGRESS NOTES
[facility]  Psychiatry Clinic  MEDICAL PROGRESS NOTE     CARE TEAM:  PCP- Physician No Ref-Primary, Psychotherapist- None     Lux Bailey is a 26 year old who prefers the name Will and uses pronouns he, him, himself    This patient has been seen by previous providers in the [facility]. Sections of the history below have been copied from previous diagnostic assessments and were independently confirmed and updated as needed during this appointment.     DIAGNOSIS     Bipolar disorder I, severe, most recent episode manic, with psychotic features, in full remission  ADHD, inattentive type   Obsessive compulsive symptoms, r/o OCD     ASSESSMENT     Patient states that the mood has been stable. Denies any marline or psychosis symptoms. His main concern today is his ADHD symptoms, primarily organizational issues. States that methylphenidate (CONCERTA) 36 mg daily has not been doing much for him, and he does not want to go up on it since he did not tolerate 54 mg previously. We discussed the option of switching to methylphenidate (METADATE CD) 10 mg or adding guanfacine. Patient decided not to make any changes to his treatment plan today. We'll discuss these options again at the next visit. In the meantime, he'll monitor his BP (today's BP is 134/90 mmHg).     He understands that concurrent use of stimulant and antidepressant medications places him at elevated risk for marline. He is not interested in increasing his lithium dose today. He states he has managed his mood symptoms and no manic episodes since 2018 despite subtherapeutic lithium levels (0.5 on 5/24/2022).     Per chart review, he was at higher doses of lithium in 2021 - 750 mg daily, which resulted in a significant increase in lethargy. At 900 mg daily, the patient experienced worsening lethargy, difficulty getting out of bed, difficulty getting out of bed, and considerable impairment in memory and concentration. His  dose was reduced to 600 mg. He self-discontinued 600 mg daily dose on 10/2021, then agreed to resume at 300 mg BID on 11/2022.      Denies SI, plan, or intent. No safety concerns    Patient will monitor his symptoms and understand that if a reduced need for sleep, insomnia, racing thoughts, or significant changes in his mood occur, he will call the clinic or go to the emergency room. He denied suicidal ideation. No safety concerns today.    Per Dr. Petty's note on 6/17/2022:  Future considerations: History of stable mood despite subtherapeutic lithium levels. Consider increasing lithium given concurrent stimulant prescriptions. Consider trazodone or mirtazapine as alternative antidepressant medication.  Consider initiation of PDE 5 inhibitor if erectile dysfunction is a concern again. Consider VPA and re-evaluate other SGA trials.      [place] was checked today:  Indicates taking controlled medication as prescribed.     PLAN                                                                                                                1) Meds-  - Continue methylphenidate (CONCERTA) 36 mg daily  - Continue lithium 300 mg BID  - Continue methylphenidate 5 mg daily at 2 PM (used only three times during the past month)  - Continue sertraline 50 mg daily.     Non prescribed medications:  - Magnesium supplement and daily multivitamin qhs     2) Psychotherapy- Interested in therapy for ADHD and eating disorder history. Referral has been made.     3) Next due-  Labs- Lithium level of 0.5 on 5/24/22. Repeat lithium due on 11/2022  EKG- as needed  Rating scales-PHQ 9 as needed     4) Referrals- Psychotherapy      5) Dispo- 4-6 weeks     PERTINENT BACKGROUND                                [most recent eval 07/24/22]     Previous diagnoses include depression, schizophrenia in 2016 when starting college, and ADHD in 2017.  He was initially diagnosed with schizophrenia and placed on antipsychotics for two years. In 2018 diagnosis  "changed from schizophrenia to bipolar disorder, and he was put on lamotrigine.  Lamotrigine was ineffective, and he was transitioned to lithium at the end of 2018.  Mood has been stable, with no manic episodes since starting lithium.  Mood symptoms have been well managed at subtherapeutic lithium levels.  He recalls he grew up in a family where \"mental illness wasn't real\" but struggled with depression starting in 1st or 2nd grade, \"regularly had teachers pull me aside and ask if everything was OK at home.\" He reports a history of chronic suicidal ideation since 3rd grade, particularly when depressed.  He has never \"actually attempted it.\" He has also had intrusive thoughts \"all the time\" about hurting or killing himself, despite not wanting to self-harm or do it.  Episodes began to get worse and worse, and he \"played the 'things are going to get better when I get to the next stage of life' game.\" He first started medications at the beginning of college, when he had his first manic episode, likely triggered by many factors, including lack of sleep and stress.  He has not had a manic episode since he started lithium in 2018/2019.  In college, he got care [facility].  His psychiatrist advised him to immediately go off all his mood stabilizers, which made him concerned, and he wanted a second opinion.  After transferring care to [facility], he subsequently self-discontinued venlafaxine due to affective flattening.     Psych pertinent item history includes suicidal ideation, multiple psychotropic trials , psych hosp (x1, but several psych ER visits) and substance use: alcohol, cannabis and hallucinogens     SUBJECTIVE     Today:  - Mood is \"pretty good\" and stable, \"no highs, lows are consistently not too bad.\"    - He moved with his girlfriend into their new apartment in early August. The move itself was stressful, and he now enjoys living w/girlfriend  - Continues to work as a  at [employer], " and work has been busy (he previously stated that he plans to pursue medicine and wants to be a psychiatrist.)  - His main concern today is his ADHD symptoms, primarily related to organizational issues and getting motivated and getting things done; he states he has trouble focusing also    - States that methylphenidate (CONCERTA) 36 mg daily has not been doing much for him, and he does not want to go up on it since he did not tolerate 54 mg previously. He stopped the medication two days ago and did see any difference.   - He wanted to follow up on our discussion from the last visit about mixing ADHD medication and adding Guanfacine to target the ADHD symptoms. - We discussed concerns related to BP and monitoring  - We discussed the option of switching to methylphenidate (METADATE CD) 10 mg.   - He understands that concurrent use of stimulant and antidepressant medications places him at elevated risk for marline.   - He is not interested in increasing his lithium dose today.  - Patient decided not to make any changes today. We'll discuss it at the next visit. He will monitor BP (his last BP is 134/90 mmHg).   - He reports decreased libido and erectile dysfunction, and we will address these concerns at the next visit  - He was previously engaged in talk therapy and group therapy for depression and trauma, not in therapy currently. He is interested in ADHD therapy, and a referral was made today,   - No concerns with weight gain; he started swimming and reports no change in weight since the last visit   - Denies SI, plan, or intent. No safety concerns    Recent Social History: see below    Recent Psych Symptoms:   Depression:  low energy.  Elevated:  none  Psychosis:  none  Anxiety:  excessive worry and nervous/overwhelmed, attributes to ADHD concerns   Trauma Related:  none  Sleep: no and denies significant sleep issues, referred for [facility], waiting for evaluation  Other: no     Adverse Effects: none   Pertinent Negative  Symptoms: No violent ideation, psychosis, hallucinations or marline, hypomania    Recent Substance Use:     Alcohol- none  Tobacco- none  Caffeine- 1 cup of coffee per day   Cannabis- none     Opioids- none           Narcan Kit- n/a   Other illicit drugs- none, no use in 2 years    Pertinent Negative Symptoms: No violent ideation, psychosis, hallucinations or marline, hypomania  Adverse Effects: none    PSYCH and SUBSTANCE USE Critical Summary Points since July 2021 6/18/21: Stopped effexor on own  7/26/21: Lithium increased to 750 mg for 2 weeks then to 900 mg if well-tolerated.  8/23/21: Lithium return to 600 mg daily.  Cariprazine 1.5 mg initiated  10/4/21: Concerta increased to 54 mg daily. Lithium self-discontinued  11/29/21: Lithium resumed at 300 mg qam for 14 days, then increase to 300 BID. Concerta decreased to 36 mg daily.  1/3/22: No changes  3/4/22: Start sertraline 50 mg daily  4/1/22: No medication changes, sleep medicine referral placed  5/6/22: Start methylphenidate 5 mg daily at 2 PM  6/17/22: No changes    7/25/2022: No medication change today  9/6/2022: No medication changes today.     FAMILY and SOCIAL HISTORY                                 pt reported     Family Hx:   Father, paternal grandfather with alcoholism.  Maternal grandfather and mother have bipolar disorder.   Middle brother with ADHD and autism  Parkinson's and Alzheimer's on both sides of family. Nothing diagnosed in the immediate family.      Social Hx:  Financial/ Work- Working,  for [employer]  Partner/ - girlfriend  Children- None      Living situation- lives in [place] in apartment alone.   Social/ Spiritual Support- friends, girlfriend, family      Feels Safe at Home- yes   Legal- None     Trauma History (self-report)- None    Early History/Education- Born in [place], [place]. Moved often in childhood in [place], [place], [place], [place], [place]. Grew up poor on food stamps during childhood. Did well academically until high school,  "not many friends until high school. Oldest of 3 brothers. Didn't finish college d/t COVID and related job loss. Dual majored in informatics and math, minors in bio and computer science. 1 semester away from graduation. Lived on own since getting job.     PAST PSYCHIATRIC HISTORY     SIB- None  Suicide Attempt [#, most recent]- None  Suicidal Ideation Hx- yes, near overdose on APAP in 2018     Violence/Aggression Hx- None  Psychosis Hx- yes, during maria m, auditory hallucinations  Eating Disorder Hx- yes, restricting, binging in college. Better now.   Other- None     Psych Hosp [#, most recent]- 1, in 2018 for maria m in the context of insomnia and substance use (LSD and ketamine)  Commitment- None  ECT- None  Outpatient Programs - None  Other - N/A     PAST MED TRIALS     Medication Max Dose (mg) Dates / Duration Helpful? DC Reason / Adverse Effects?   lamotrigine       Lack of efficacy   lithium 900 current Y [place] \"on edge\" at higher doses (900), severe lethargy and memory impairment at doses above 600 mg. Despite side effects works better than other mood stabilizer trials.   bupropion       Maria M, while on lithium   escitalopram       Maria M, while on lithium, OCD   atomoxetine     N Hot flashes, nausea. 1 month trial   Adderall    3452-0004 Y \"I hated it\" moderate efficacy   lisdexamfetamine     N Lack of efficacy   dexadrine       Felt like a zombie, dissociated    lurasidone       Somnolence, cognitive slowing   methylphenidate 54 current Y Wears off quick, emotional blunting and low libido.    venlafaxine  150  3195-4305 Y  initially helpful, discontinued due to affect of flattening and emotional blunting.   cariprazine  1.5  2021 to current Y     risperidone       Cognitive slowing   aripirazole       Tremors, cognitive slowing   ziprazidone       Cognitive slowing   olanzapine       Cognitive slowing   cariprazine 1.5 2021-current Y Weight gain           PAST SUBSTANCE USE HISTORY     Past Use-  Numerous drugs " "in college, former cannabis use. LSD, ketamine, cannabis, DMT, psylocybin, research chemicals. No opioids or benzodiazapines  Treatment- #, most recent- no  Medical Consequences- no  Legal Consequences- no  Other- N/A     MEDICAL HISTORY and ALLERGY     ALLERGIES: Patient has no known allergies.    There is no problem list on file for this patient.       MEDICAL REVIEW OF SYSTEMS   Contraception- Vasectomy,  Pregnant- N/A  A comprehensive review of systems was performed and is negative other than noted in the HPI.     MEDICATIONS     Current Outpatient Medications   Medication Sig Dispense Refill     lithium ER (LITHOBID) 300 MG CR tablet Take 1 tablet (300 mg) by mouth 2 times daily 180 tablet 0     methylphenidate (CONCERTA) 36 MG CR tablet Take 1 tablet (36 mg) by mouth every morning 30 tablet 0     methylphenidate (RITALIN) 5 MG tablet Take 1 tablet (5 mg) by mouth daily 30 tablet 0     sertraline (ZOLOFT) 50 MG tablet Take 1 tablet (50 mg) by mouth daily 90 tablet 0      VITALS   BP (!) 134/90 (BP Location: Left arm, Patient Position: Sitting, Cuff Size: Adult Regular)   Pulse 116   Wt 105.8 kg (233 lb 3.2 oz)     MENTAL STATUS EXAM     Alertness: alert  and oriented  Appearance: adequately groomed  Behavior/Demeanor: cooperative, pleasant and calm, with good  eye contact   Speech: normal and regular rate and rhythm  Language: intact and no problems  Psychomotor: normal or unremarkable  Mood: \"pretty good\"  Affect: full range; congruent to: mood- yes, content- yes  Thought Process/Associations: unremarkable  Thought Content:  Reports none;  Denies suicidal & violent ideation and delusions  Perception:  Reports none;  Denies none  Insight: good  Judgment: good  Cognition: does  appear grossly intact; formal cognitive testing was not done  Gait and Station: unremarkable     LABS and DATA     PHQ 5/14/2021 6/18/2021 1/3/2022   PHQ-9 Total Score 21 15 6   Q9: Thoughts of better off dead/self-harm past 2 weeks " Nearly every day More than half the days Not at all   F/U: Thoughts of suicide or self-harm Yes Yes -   F/U: Self harm-plan No No -   F/U: Self-harm action No No -   F/U: Safety concerns No No -     9/6/2022: PHQ-9 total score is 9 (Q9: Not at all).       Recent Labs   Lab Test 06/02/21  0827   GLC 91     No lab results found.  Recent Labs   Lab Test 06/02/21  0827   AST 28   ALT 49   ALKPHOS 90         Recent Labs   Lab Test 05/24/22  1159 06/02/21  0827   LITHIUM 0.5 0.42*     Recent Labs   Lab Test 06/02/21 0827   CR 1.14   GFRESTIMATED 90      POTASSIUM 4.1   JODI 9.2     Recent Labs   Lab Test 06/02/21 0827   SG 1.025     Recent Labs   Lab Test 06/02/21 0827   TSH 0.98     Recent Labs   Lab Test 05/24/22  1159 06/02/21  0827   WBC 7.3 5.6   ANEU  --  3.2       No ECG in the chart.      PSYCHOTROPIC DRUG INTERACTIONS                                                       PSYCHCLINICDDI     Additive serotonin syndrome: Lithium, sertraline     MANAGEMENT:  Monitoring for adverse effects, routine vitals and routine labs       RISK STATEMENT for SAFETY     Will did not appear to be an imminent safety risk to self or others.    TREATMENT RISK STATEMENT: The risks, benefits, alternatives and potential adverse effects have been discussed and are understood by the pt. The pt understands the risks of using street drugs or alcohol. There are no medical contraindications, the pt agrees to treatment with the ability to do so. The pt knows to call the clinic for any problems or to access emergency care if needed.  Medical and substance use concerns are documented above.  Psychotropic drug interaction check was done, including changes made today.     PROVIDER: Anastasia Perrin MD, PhD    Patient staffed in clinic with Dr. Fierro who will sign the note.  Supervisor is Dr. Lozano.

## 2022-09-12 ENCOUNTER — OFFICE VISIT (OUTPATIENT)
Dept: SLEEP MEDICINE | Facility: CLINIC | Age: 27
End: 2022-09-12
Payer: COMMERCIAL

## 2022-09-12 VITALS
DIASTOLIC BLOOD PRESSURE: 85 MMHG | SYSTOLIC BLOOD PRESSURE: 127 MMHG | HEART RATE: 82 BPM | HEIGHT: 72 IN | OXYGEN SATURATION: 96 % | WEIGHT: 232 LBS | BODY MASS INDEX: 31.42 KG/M2

## 2022-09-12 DIAGNOSIS — R06.83 SNORING: ICD-10-CM

## 2022-09-12 DIAGNOSIS — E66.811 OBESITY (BMI 30.0-34.9): ICD-10-CM

## 2022-09-12 DIAGNOSIS — G47.9 SLEEP DISTURBANCE: Primary | ICD-10-CM

## 2022-09-12 PROCEDURE — 99203 OFFICE O/P NEW LOW 30 MIN: CPT | Performed by: INTERNAL MEDICINE

## 2022-09-12 ASSESSMENT — SLEEP AND FATIGUE QUESTIONNAIRES
HOW LIKELY ARE YOU TO NOD OFF OR FALL ASLEEP WHILE SITTING AND READING: MODERATE CHANCE OF DOZING
HOW LIKELY ARE YOU TO NOD OFF OR FALL ASLEEP WHILE LYING DOWN TO REST IN THE AFTERNOON WHEN CIRCUMSTANCES PERMIT: HIGH CHANCE OF DOZING
HOW LIKELY ARE YOU TO NOD OFF OR FALL ASLEEP WHEN YOU ARE A PASSENGER IN A CAR FOR AN HOUR WITHOUT A BREAK: SLIGHT CHANCE OF DOZING
HOW LIKELY ARE YOU TO NOD OFF OR FALL ASLEEP IN A CAR, WHILE STOPPED FOR A FEW MINUTES IN TRAFFIC: WOULD NEVER DOZE
HOW LIKELY ARE YOU TO NOD OFF OR FALL ASLEEP WHILE SITTING AND TALKING TO SOMEONE: WOULD NEVER DOZE
HOW LIKELY ARE YOU TO NOD OFF OR FALL ASLEEP WHILE SITTING INACTIVE IN A PUBLIC PLACE: WOULD NEVER DOZE
HOW LIKELY ARE YOU TO NOD OFF OR FALL ASLEEP WHILE WATCHING TV: MODERATE CHANCE OF DOZING
HOW LIKELY ARE YOU TO NOD OFF OR FALL ASLEEP WHILE SITTING QUIETLY AFTER LUNCH WITHOUT ALCOHOL: SLIGHT CHANCE OF DOZING

## 2022-09-12 NOTE — PATIENT INSTRUCTIONS
Your BMI is Body mass index is 31.46 kg/m .    What is BMI?  Body mass index (BMI) is one way to tell whether you are at a healthy weight, overweight, or obese. It measures your weight in relation to your height.  A BMI of 18.5 to 24.9 is in the healthy range. A person with a BMI of 25 to 29.9 is considered overweight, and someone with a BMI of 30 or greater is considered obese.  Another way to find out if you are at risk for health problems caused by overweight and obesity is to measure your waist. If you are a woman and your waist is more than 35 inches, or if you are a man and your waist is more than 40 inches, your risk of disease may be higher.  More than two-thirds of American adults are considered overweight or obese. Being overweight or obese increases the risk for further weight gain.  Excess weight may lead to heart disease and diabetes. Creating and following plans for healthy eating and physical activity may help you improve your health.    Methods for maintaining or losing weight.  Weight control is part of healthy lifestyle and includes exercise, emotional health, and healthy eating habits.  Careful eating habits lifelong is the mainstay of weight control.  Though there are significant health benefits from weight loss, long-term weight loss with diet alone may be very difficult to achieve- studies show long-term success with dietary management in less than 10% of people. Attaining a healthy weight may be especially difficult to achieve in those with severe obesity. In some cases, medications, devices and surgical management might be considered.    What can you do?  If you are overweight or obese and are interested in methods for weight loss, you should discuss this with your provider. In addition, we recommend that you review healthy life styles and methods for weight loss available through the National Institutes of Health patient information sites:  "  http://win.niddk.nih.gov/publications/index.htm        MY TREATMENT INFORMATION FOR SLEEP APNEA-  Lux Bailey    DOCTOR : Karla Turner MD    Am I having a home sleep study?  --->Watch the video for the device you are using:    -/drop off device-   https://www.LuxTicket.sgube.com/watch?v=yGGFBdELGhk    -Disposable device sent out require phone/computer application-   https://www.LuxTicket.sgube.com/watch?v=BCce_vbiwxE      Frequently asked questions:  1. What is Obstructive Sleep Apnea (CARLOS)? CARLOS is the most common type of sleep apnea. Apnea means, \"without breath.\"  Apnea is most often caused by narrowing or collapse of the upper airway as muscles relax during sleep.   Almost everyone has occasional apneas. Most people with sleep apnea have had brief interruptions at night frequently for many years.  The severity of sleep apnea is related to how frequent and severe the events are.   2. What are the consequences of CARLOS? Symptoms include: feeling sleepy during the day, snoring loudly, gasping or stopping of breathing, trouble sleeping, and occasionally morning headaches or heartburn at night.  Sleepiness can be serious and even increase the risk of falling asleep while driving. Other health consequences may include development of high blood pressure and other cardiovascular disease in persons who are susceptible. Untreated CARLOS  can contribute to heart disease, stroke and diabetes.   3. What are the treatment options? In most situations, sleep apnea is a lifelong disease that must be managed with daily therapy. Medications are not effective for sleep apnea and surgery is generally not considered until other therapies have been tried. Your treatment is your choice . Continuous Positive Airway (CPAP) works right away and is the therapy that is effective in nearly everyone. An oral device to hold your jaw forward is usually the next most reliable option. Other options include postioning devices " (to keep you off your back), weight loss, and surgery including a tongue pacing device. There is more detail about some of these options below.  4. Are my sleep studies covered by insurance? Although we will request verification of coverage, we advise you also check in advance of the study to ensure there is coverage.    Important tips for those choosing CPAP and similar devices   Know your equipment:  CPAP is continuous positive airway pressure that prevents obstructive sleep apnea by keeping the throat from collapsing while you are sleeping. In most cases, the device is  smart  and can slowly self-adjusts if your throat collapses and keeps a record every day of how well you are treated-this information is available to you and your care team.  BPAP is bilevel positive airway pressure that keeps your throat open and also assists each breath with a pressure boost to maintain adequate breathing.  Special kinds of BPAP are used in patients who have inadequate breathing from lung or heart disease. In most cases, the device is  smart  and can slowly self-adjusts to assist breathing. Like CPAP, the device keeps a record of how well you are treated.  Your mask is your connection to the device. You get to choose what feels most comfortable and the staff will help to make sure if fits. Here: are some examples of the different masks that are available:       Key points to remember on your journey with sleep apnea:  Sleep study.  PAP devices often need to be adjusted during a sleep study to show that they are effective and adjusted right.  Good tips to remember: Try wearing just the mask during a quiet time during the day so your body adapts to wearing it. A humidifier is recommended for comfort in most cases to prevent drying of your nose and throat. Allergy medication from your provider may help you if you are having nasal congestion.  Getting settled-in. It takes more than one night for most of us to get used to wearing a  mask. Try wearing just the mask during a quiet time during the day so your body adapts to wearing it. A humidifier is recommended for comfort in most cases. Our team will work with you carefully on the first day and will be in contact within 4 days and again at 2 and 4 weeks for advice and remote device adjustments. Your therapy is evaluated by the device each day.   Use it every night. The more you are able to sleep naturally for 7-8 hours, the more likely you will have good sleep and to prevent health risks or symptoms from sleep apnea. Even if you use it 4 hours it helps. Occasionally all of us are unable to use a medical therapy, in sleep apnea, it is not dangerous to miss one night.   Communicate. Call our skilled team on the number provided on the first day if your visit for problems that make it difficult to wear the device. Over 2 out of 3 patients can learn to wear the device long-term with help from our team. Remember to call our team or your sleep providers if you are unable to wear the device as we may have other solutions for those who cannot adapt to mask CPAP therapy. It is recommended that you sleep your sleep provider within the first 3 months and yearly after that if you are not having problems.   Use it for your health. We encourage use of CPAP masks during daytime quiet periods to allow your face and brain to adapt to the sensation of CPAP so that it will be a more natural sensation to awaken to at night or during naps. This can be very useful during the first few weeks or months of adapting to CPAP though it does not help medically to wear CPAP during wakefulness and  should not be used as a strategy just to meet guidelines.  Take care of your equipment. Make sure you clean your mask and tubing using directions every day and that your filter and mask are replaced as recommended or if they are not working.     BESIDES CPAP, WHAT OTHER THERAPIES ARE THERE?    Positioning Device  Positioning  devices are generally used when sleep apnea is mild and only occurs on your back.This example shows a pillow that straps around the waist. It may be appropriate for those whose sleep study shows milder sleep apnea that occurs primarily when lying flat on one's back. Preliminary studies have shown benefit but effectiveness at home may need to be verified by a home sleep test. These devices are generally not covered by medical insurance.  Examples of devices that maintain sleeping on the back to prevent snoring and mild sleep apnea.    Belt type body positioner  http://Happyshoposa.Avimoto/    Electronic reminder  http://nightshifttherapy.com/            Oral Appliance  What is oral appliance therapy?  An oral appliance device fits on your teeth at night like a retainer used after having braces. The device is made by a specialized dentist and requires several visits over 1-2 months before a manufactured device is made to fit your teeth and is adjusted to prevent your sleep apnea. Once an oral device is working properly, snoring should be improved. A home sleep test may be recommended at that time if to determine whether the sleep apnea is adequately treated.       Some things to remember:  -Oral devices are often, but not always, covered by your medical insurance. Be sure to check with your insurance provider.   -If you are referred for oral therapy, you will be given a list of specialized dentists to consider or you may choose to visit the Web site of the American Academy of Dental Sleep Medicine  -Oral devices are less likely to work if you have severe sleep apnea or are extremely overweight.     More detailed information  An oral appliance is a small acrylic device that fits over the upper and lower teeth  (similar to a retainer or a mouth guard). This device slightly moves jaw forward, which moves the base of the tongue forward, opens the airway, improves breathing for effective treat snoring and obstructive sleep apnea  in perhaps 7 out of 10 people .  The best working devices are custom-made by a dental device  after a mold is made of the teeth 1, 2, 3.  When is an oral appliance indicated?  Oral appliance therapy is recommended as a first-line treatment for patients with primary snoring, mild sleep apnea, and for patients with moderate sleep apnea who prefer appliance therapy to use of CPAP4, 5. Severity of sleep apnea is determined by sleep testing and is based on the number of respiratory events per hour of sleep.   How successful is oral appliance therapy?  The success rate of oral appliance therapy in patients with mild sleep apnea is 75-80% while in patients with moderate sleep apnea it is 50-70%. The chance of success in patients with severe sleep apnea is 40-50%. The research also shows that oral appliances have a beneficial effect on the cardiovascular health of CARLOS patients at the same magnitude as CPAP therapy7.  Oral appliances should be a second-line treatment in cases of severe sleep apnea, but if not completely successful then a combination therapy utilizing CPAP plus oral appliance therapy may be effective. Oral appliances tend to be effective in a broad range of patients although studies show that the patients who have the highest success are females, younger patients, those with milder disease, and less severe obesity. 3, 6.   Finding a dentist that practices dental sleep medicine  Specific training is available through the American Academy of Dental Sleep Medicine for dentists interested in working in the field of sleep. To find a dentist who is educated in the field of sleep and the use of oral appliances, near you, visit the Web site of the American Academy of Dental Sleep Medicine.    References  1. Lalita et al. Objectively measured vs self-reported compliance during oral appliance therapy for sleep-disordered breathing. Chest 2013; 144(5): 2421-4958.  2. Meir et al. Objective  measurement of compliance during oral appliance therapy for sleep-disordered breathing. Thorax 2013; 68(1): 91-96.  3. Marli et al. Mandibular advancement devices in 620 men and women with CARLOS and snoring: tolerability and predictors of treatment success. Chest 2004; 125: 4956-8847.  4. Eleno et al. Oral appliances for snoring and CARLOS: a review. Sleep 2006; 29: 244-262.  5. Ayad et al. Oral appliance treatment for CARLOS: an update. J Clin Sleep Med 2014; 10(2): 215-227.  6. Don et al. Predictors of OSAH treatment outcome. J Dent Res 2007; 86: 5746-2366.      Weight Loss:    Weight loss is a long-term strategy that may improve sleep apnea in some patients.    Weight management is a personal decision and the decision should be based on your interest and the potential benefits.  If you are interested in exploring weight loss strategies, the following discussion covers the impact on weight loss on sleep apnea and the approaches that may be successful.    Being overweight does not necessarily mean you will have health consequences.  Those who have BMI over 35 or over 27 with existing medical conditions carries greater risk.   Weight loss decreases severity of sleep apnea in most people with obesity. For those with mild obesity who have developed snoring with weight gain, even 15-30 pound weight loss can improve and occasionally eliminate sleep apnea.  Structured and life-long dietary and health habits are necessary to lose weight and keep healthier weight levels.     Though there may be significant health benefits from weight loss, long-term weight loss is very difficult to achieve- studies show success with dietary management in less than 10% of people. In addition, substantial weight loss may require years of dietary control and may be difficult if patients have severe obesity. In these cases, surgical management may be considered.  Finally, older individuals who have tolerated obesity without health  complications may be less likely to benefit from weight loss strategies.      Surgery:    Surgery for obstructive sleep apnea is considered generally only when other therapies fail to work. Surgery may be discussed with you if you are having a difficult time tolerating CPAP and or when there is an abnormal structure that requires surgical correction.  Nose and throat surgeries often enlarge the airway to prevent collapse.  Most of these surgeries create pain for 1-2 weeks and up to half of the most common surgeries are not effective throughout life.  You should carefully discuss the benefits and drawbacks to surgery with your sleep provider and surgeon to determine if it is the best solution for you.   More information  Surgery for CARLOS is directed at areas that are responsible for narrowing or complete obstruction of the airway during sleep.  There are a wide range of procedures available to enlarge and/or stabilize the airway to prevent blockage of breathing in the three major areas where it can occur: the palate, tongue, and nasal regions.  Successful surgical treatment depends on the accurate identification of the factors responsible for obstructive sleep apnea in each person.  A personalized approach is required because there is no single treatment that works well for everyone.  Because of anatomic variation, consultation with an examination by a sleep surgeon is a critical first step in determining what surgical options are best for each patient.  In some cases, examination during sedation may be recommended in order to guide the selection of procedures.  Patients will be counseled about risks and benefits as well as the typical recovery course after surgery. Surgery is typically not a cure for a person s CARLOS.  However, surgery will often significantly improve one s CARLOS severity (termed  success rate ).  Even in the absence of a cure, surgery will decrease the cardiovascular risk associated with OSA7; improve  overall quality of life8 (sleepiness, functionality, sleep quality, etc).      Palate Procedures:  Patients with CARLOS often have narrowing of their airway in the region of their tonsils and uvula.  The goals of palate procedures are to widen the airway in this region as well as to help the tissues resist collapse.  Modern palate procedure techniques focus on tissue conservation and soft tissue rearrangement, rather than tissue removal.  Often the uvula is preserved in this procedure. Residual sleep apnea is common in patient after pharyngoplasty with an average reduction in sleep apnea events of 33%2.      Tongue Procedures:  ExamWhile patients are awake, the muscles that surround the throat are active and keep this region open for breathing. These muscles relax during sleep, allowing the tongue and other structures to collapse and block breathing.  There are several different tongue procedures available.  Selection of a tongue base procedure depends on characteristics seen on physical exam.  Generally, procedures are aimed at removing bulky tissues in this area or preventing the back of the tongue from falling back during sleep.  Success rates for tongue surgery range from 50-62%3.    Hypoglossal Nerve Stimulation:  Hypoglossal nerve stimulation has recently received approval from the [place] Food and Drug Administration for the treatment of obstructive sleep apnea.  This is based on research showing that the system was safe and effective in treating sleep apnea6.  Results showed that the median AHI score decreased 68%, from 29.3 to 9.0. This therapy uses an implant system that senses breathing patterns and delivers mild stimulation to airway muscles, which keeps the airway open during sleep.  The system consists of three fully implanted components: a small generator (similar in size to a pacemaker), a breathing sensor, and a stimulation lead.  Using a small handheld remote, a patient turns the therapy on  before bed and off upon awakening.    Candidates for this device must be greater than 22 years of age, have moderate to severe CARLOS (AHI between 20-65), BMI less than 32, have tried CPAP/oral appliance without success, and have appropriate upper airway anatomy (determined by a sleep endoscopy performed by Dr. Virgen).    Hypoglossal Nerve Stimulation Pathway:    The sleep surgeon s office will work with the patient through the insurance prior-authorization process (including communications and appeals).    Nasal Procedures:  Nasal obstruction can interfere with nasal breathing during the day and night.  Studies have shown that relief of nasal obstruction can improve the ability of some patients to tolerate positive airway pressure therapy for obstructive sleep apnea1.  Treatment options include medications such as nasal saline, topical corticosteroid and antihistamine sprays, and oral medications such as antihistamines or decongestants. Non-surgical treatments can include external nasal dilators for selected patients. If these are not successful by themselves, surgery can improve the nasal airway either alone or in combination with these other options.      Combination Procedures:  Combination of surgical procedures and other treatments may be recommended, particularly if patients have more than one area of narrowing or persistent positional disease.  The success rate of combination surgery ranges from 66-80%2,3.    References  Gamal ALMODOVAR. The Role of the Nose in Snoring and Obstructive Sleep Apnoea: An Update.  Eur Arch Otorhinolaryngol. 2011; 268: 1365-73.   Brayan SM; Kena JA; Cary JR; Pallanch JF; Amna MB; Sharmaine SG; Jose BLISS. Surgical modifications of the upper airway for obstructive sleep apnea in adults: a systematic review and meta-analysis. SLEEP 2010;33(10):3147-9379. Jaziel CRUZ. Hypopharyngeal surgery in obstructive sleep apnea: an evidence-based medicine review.  Arch Otolaryngol Head Neck Surg.  2006 Feb;132(2):206-13.  Juancho YH1, Osorio Y, Derian SAGE. The efficacy of anatomically based multilevel surgery for obstructive sleep apnea. Otolaryngol Head Neck Surg. 2003 Oct;129(4):327-35.  Jaziel CRUZ, Goldberg A. Hypopharyngeal Surgery in Obstructive Sleep Apnea: An Evidence-Based Medicine Review. Arch Otolaryngol Head Neck Surg. 2006 Feb;132(2):206-13.  Penny BANKS et al. Upper-Airway Stimulation for Obstructive Sleep Apnea.  N Engl J Med. 2014 Jan 9;370(2):139-49.  Yanni Y et al. Increased Incidence of Cardiovascular Disease in Middle-aged Men with Obstructive Sleep Apnea. Am J Respir Crit Care Med; 2002 166: 159-165  Tiradojailene STAHL et al. Studying Life Effects and Effectiveness of Palatopharyngoplasty (SLEEP) study: Subjective Outcomes of Isolated Uvulopalatopharyngoplasty. Otolaryngol Head Neck Surg. 2011; 144: 623-631.        WHAT IF I ONLY HAVE SNORING?    Mandibular advancement devices, lateral sleep positioning, long-term weight loss and treatment of nasal allergies have been shown to improve snoring.  Exercising tongue muscles with a game (Razumettps://apps.Wedge Buster/us/rafael/soundly-reduce-snoring/dk3356351207) or stimulating the tongue during the day with a device (https://doi.org/10.3390/jdg21544903) have improved snoring in some individuals.    Remember to Drive Safe... Drive Alive     Sleep health profoundly affects your health, mood, and your safety.  Thirty three percent of the population (one in three of us) is not getting enough sleep and many have a sleep disorder. Not getting enough sleep or having an untreated / undertreated sleep condition may make us sleepy without even knowing it. In fact, our driving could be dramatically impaired due to our sleep health. As your provider, here are some things I would like you to know about driving:     Here are some warning signs for impairment and dangerous drowsy driving:              -Having been awake more than 16 hours               -Looking tired                -Eyelid drooping              -Head nodding (it could be too late at this point)              -Driving for more than 30 minutes     Some things you could do to make the driving safer if you are experiencing some drowsiness:              -Stop driving and rest              -Call for transportation              -Make sure your sleep disorder is adequately treated     Some things that have been shown NOT to work when experiencing drowsiness while driving:              -Turning on the radio              -Opening windows              -Eating any  distracting  /  entertaining  foods (e.g., sunflower seeds, candy, or any other)              -Talking on the phone      Your decision may not only impact your life, but also the life of others. Please, remember to drive safe for yourself and all of us.

## 2022-09-12 NOTE — PROGRESS NOTES
Outpatient Sleep Medicine Consultation:      Name: Lux Bailey MRN# [id number]   Age: 26 year old YOB: 1995     Date of Consultation: September 12, 2022  Consultation is requested by: Lupillo Petty MD  [address] [place] [address] Lupillo Petty  Primary care provider: No Ref-Primary, Physician       Reason for Sleep Consult:     Lux Bailey is sent by Lupillo Petty for a sleep consultation obtain evaluation for possible sleep apnea    Patient s Reason for visit  Lux Bailey main reason for visit: To talk to doctor about the possibility of sleep apnea.  Patient states problem(s) started: ~6 years ago  Lux Bailey's goals for this visit: Determine if I have sleep apnea.           Assessment and Plan:    Snoring, observed apneas,  non restorative sleep, fatigue, EDS, in the setting of obesity and Bipolar disorder I, severe, most recent episode manic, with psychotic features, in full remission  ADHD, inattentive type. Obsessive compulsive symptoms.Possible Obstructive sleep apnea  STOP BANG score is 5 out of 8. Patient likely has sleep apnea based on clinical history, male gender, body habitus, neck circumference and positive family history for CARLOS.   HST/ Polysomnography reviewed. Recommend HST to evaluate for CARLOS and if the HST is negative, we will consider obtaining in-lab sleep study and patient was agreeable with the plan.  Obstructive sleep apnea and consequences of untreated sleep apnea were reviewed.  Information provided regarding treatment options for CARLOS.  Patient is willing to consider CPAP treatment if it is indicated. He also expressed interested in obtaining surgical evaluation if he has CARLOS    Obesity: We discussed weight management with healthy diet, and exercise.    --Encouraged to follow good sleep hygiene/behavioral techniques such as avoiding using electronic devices in bed    --Recommended to  "follow regular sleep schedule and aim at obtaining at least 7 to 8 hours per night and avoid sleep deprivation    --Patient was strongly advised to avoid driving, operating any heavy machinery or other hazardous situations while drowsy or sleepy.  Patient was counseled on the importance of driving while alert, to pull over if drowsy, or nap before getting into the vehicle if sleepy.       Bipolar disorder I, severe, most recent episode manic, with psychotic features, in full remission, ADHD, inattentive type. Obsessive compulsive symptoms: He will continue follow-up with psychiatry.    Plan is to communicate results of sleep study via [email or web address].    Summary Counseling:    Sleep Testing Reviewed  Obstructive Sleep Apnea Reviewed  Complications of Untreated Sleep Apnea Reviewed    Medical Decision-making:   Educational materials provided in instruction.    CC: Lupillo Petty    The above note was dictated using voice recognition software. Although reviewed after completion, some word and grammatical error may remain . Please contact the author for any clarifications.    \"I spent a total of 45 minutes  face to face with Lux Bailey during today's office visit. 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  Wakes up due to Snorting self awake  He has trouble falling back asleep 0 times a week.   It usually takes n/a to get back to sleep  Patient is usually up at 4:45am  Uses alarm: Yes    Weekends/Non-work Days/All Other Days:  Usually gets into bed at 10:00pm   Takes patient about 3 minutes to fall asleep  Patient is usually up at 6:00 am  Uses alarm: Yes    He reports non restorative sleep, feel groggy, fatigue, occasional EDS.    Sleep Need  Patient gets  7 Hours sleep on average   Patient thinks he needs about 7 Hours sleep    Lux Bailey prefers to sleep in this position(s): Side;Stomach   Patient states they do the following activities in bed: Use phone, computer, or tablet    Naps  Patient takes a purposeful nap 0 times a week and naps are usually 1 hour in duration  He feels better after a nap: Yes  He dozes off unintentionally ~3 days per week  Patient has had a driving accident or near-miss due to sleepiness/drowsiness: No      SLEEP DISRUPTIONS:    Breathing/Snoring  Patient snores:Yes  Other people complain about his snoring: Yes, he has been told that he snores in all body positions  Patient has been told he stops breathing in his sleep:Yes   Gasping/choking: if he sleeps on his back  He has issues with the following: Morning headaches;Morning mouth dryness    Movement:  Patient gets pain, discomfort, with an urge to move:  No  It happens when he is resting:  No  It happens more at night:  No  Patient has been told he kicks his legs at night:  No     Behaviors in Sleep:  Lux Bailey has experienced the following behaviors while sleeping: Recurring Nightmares;Teeth grinding   He reports occasional sleep talking  Denies sleep walking, sleep eating or dream enactment behavior   He has experienced sudden muscle weakness during the day: No        CAFFEINE AND OTHER SUBSTANCES:    Patient consumes caffeinated  beverages per day:  0  Last caffeine use is usually: n/a  List of any prescribed or over the counter stimulants that patient takes: Concerta (taken at 10am)  List of any prescribed or over the counter sleep medication patient takes: n/a  List of previous sleep medications that patient has tried: n/a  Patient drinks alcohol to help them sleep: No  Patient drinks alcohol near bedtime: No      SCALES:    EPWORTH SLEEPINESS SCALE      [place] Sleepiness Scale ( OLEKSANDR Webster  [id number]<br>ESS - [place]/English - Final version - 21 Nov 07 - [facility] [employer].) 9/12/2022   Sitting and reading Moderate chance of dozing   Watching TV Moderate chance of dozing   Sitting, inactive in a public place (e.g. a theatre or a meeting) Would never doze   As a passenger in a car for an hour without a break Slight chance of dozing   Lying down to rest in the afternoon when circumstances permit High chance of dozing   Sitting and talking to someone Would never doze   Sitting quietly after a lunch without alcohol Slight chance of dozing   In a car, while stopped for a few minutes in traffic Would never doze   [place] Score (MC) 9   [place] Score (Sleep) 9         INSOMNIA SEVERITY INDEX (YOGESH)      Insomnia Severity Index (YOGESH) 9/12/2022   Difficulty falling asleep 0   Difficulty staying asleep 0   Problems waking up too early 2   How SATISFIED/DISSATISFIED are you with your CURRENT sleep pattern? 2   How NOTICEABLE to others do you think your sleep problem is in terms of impairing the quality of your life? 2   How WORRIED/DISTRESSED are you about your current sleep problem? 2   To what extent do you consider your sleep problem to INTERFERE with your daily functioning (e.g. daytime fatigue, mood, ability to function at work/daily chores, concentration, memory, mood, etc.) CURRENTLY? 4   YOGESH Total Score 12       Guidelines for Scoring/Interpretation:  Total score categories:  0-7 = No clinically significant insomnia   8-14 = Subthreshold  "insomnia   15-21 = Clinical insomnia (moderate severity)  22-28 = Clinical insomnia (severe)  Used via courtesy of www.myhealth.va.gov with permission from Nigel Zepeda PhD., [facility]      STOP BANG     STOP BANG Questionnaire (  2008, the American Society of Anesthesiologists, Inc. [employer] 9/12/2022   1. Snoring - Do you snore loudly (louder than talking or loud enough to be heard through closed doors)? Yes   2. Tired - Do you often feel tired, fatigued, or sleepy during daytime? Yes   3. Observed - Has anyone observed you stop breathing during your sleep? Yes   4. Blood pressure - Do you have or are you being treated for high blood pressure? No   5. BMI - BMI more than 35 kg/m2? No   6. Age - Age over 50 yr old? No   7. Neck circumference - Neck circumference greater than 40 cm? Yes   8. Gender - Gender male? Yes   STOP BANG Score (MC): 5(High risk of CARLOS)   B/P Clinic: -   Some encounter information is confidential and restricted. Go to Review Flowsheets activity to see all data.         GAD7    No flowsheet data found.      CAGE-AID    No flowsheet data found.    CAGE-AID reprinted with permission from the [place] Medical Journal, MARTY Fairbanks. and LIZET Felipe, \"Conjoint screening questionnaires for alcohol and drug abuse\" [place] Medical Journal 94: 135-140, 1995.      PATIENT HEALTH QUESTIONNAIRE-9 (PHQ - 9)    PHQ-9 ([employer]) 6/18/2021   1.  Little interest or pleasure in doing things Several days   2.  Feeling down, depressed, or hopeless Several days   3.  Trouble falling or staying asleep, or sleeping too much Several days   4.  Feeling tired or having little energy Nearly every day   5.  Poor appetite or overeating Several days   6.  Feeling bad about yourself Nearly every day   7.  Trouble concentrating Nearly every day   8.  Moving slowly or restless Not at all   9.  Suicidal or self-harm thoughts More than half the days   PHQ-9 via [employer] TOTAL SCORE-----> 15 " (Moderately severe depression)   Difficulty at work, home, or with people Extremely difficult   F/U: Thoughts of suicide or self harm? Yes   F/U: Plan or intention for self harm? No   F/U: Acted on thoughts? No   F/U: Safety concerns for self or others? No   Some encounter information is confidential and restricted. Go to Review Flowsheets activity to see all data.       Developed by Christopher Weir, Dee Ortiz, Yifan Akers and colleagues, with an educational obdulio from [employer]. No permission required to reproduce, translate, display or distribute.        Allergies:    No Known Allergies    Medications:    Current Outpatient Medications   Medication Sig Dispense Refill     lithium ER (LITHOBID) 300 MG CR tablet Take 1 tablet (300 mg) by mouth 2 times daily 60 tablet 2     methylphenidate (CONCERTA) 36 MG CR tablet Take 1 tablet (36 mg) by mouth every morning 30 tablet 0     methylphenidate (RITALIN) 5 MG tablet Take 1 tablet (5 mg) by mouth daily 30 tablet 0     sertraline (ZOLOFT) 50 MG tablet Take 1 tablet (50 mg) by mouth daily 30 tablet 2       Problem List:  There are no problems to display for this patient.       Past Medical/Surgical History:  Bipolar disorder I, severe, most recent episode manic, with psychotic features, in full remission  ADHD, inattentive type   Obsessive compulsive symptoms    No past surgical history on file.    Social History:  Social History     Socioeconomic History     Marital status: Single     Spouse name: Not on file     Number of children: Not on file     Years of education: Not on file     Highest education level: Not on file   Occupational History     Not on file   Tobacco Use     Smoking status: Never Smoker     Smokeless tobacco: Never Used   Substance and Sexual Activity     Alcohol use: Not on file     Drug use: Not on file     Sexual activity: Not on file   Other Topics Concern     Not on file   Social History Narrative     Not on file     Social  Determinants of Health     Financial Resource Strain: Not on file   Food Insecurity: Not on file   Transportation Needs: Not on file   Physical Activity: Not on file   Stress: Not on file   Social Connections: Not on file   Intimate Partner Violence: Not on file   Housing Stability: Not on file       Family History:  Patient has a family member been diagnosed with a sleep disorder: Yes  Father and Father s Mother both diagnosed with sleep apnea.     Review of Systems:  A complete review of systems reviewed by me is negative with the exeption of what has been mentioned in the history of present illness.  In the last TWO WEEKS have you experienced any of the following symptoms?  Fevers: No  Night Sweats: No  Weight Gain: No  Pain at Night: No  Double Vision: No  Changes in Vision: No  Difficulty Breathing through Nose: No  Sore Throat in Morning: Yes  Dry Mouth in the Morning: Yes  Shortness of Breath Lying Flat: No  Shortness of Breath With Activity: No  Awakening with Shortness of Breath: No  Increased Cough: No  Heart Racing at Night: No  Swelling in Feet or Legs: No  Diarrhea at Night: No  Heartburn at Night: No  Urinating More than Once at Night: No  Losing Control of Urine at Night: No  Joint Pains at Night: No  Headaches in Morning: No  Weakness in Arms or Legs: No  Depressed Mood: Yes, denies suicidal ideation/thpoughts of harming others  Anxiety: Yes           Physical Examination:  Vitals: /85   Pulse 82   Ht 1.829 m (6')   Wt 105.2 kg (232 lb)   SpO2 96%   BMI 31.46 kg/m    BMI= Body mass index is 31.46 kg/m .    Neck Cir (cm): 44 cm    [place] Total Score 9/12/2022   Total score - [place] 9     General: No apparent distress, appropriately groomed  Head: Normocephalic, atraumatic  Nose: Nares patent. No exudate/erythema.   Mouth: op pink and moist, prominent tongue base tongue  Orophraynx:  uvula: Thick and elongated   Mallampati Class: IV.   Tonsillar Stage: 2  visible at pillars.  Neck: Supple,  Circumference:17.25 inches  Cardiac: Regular S1, S2 no gallops or murmurs  Chest: Symmetric air movement, lungs clear to auscultation bilaterally  Musculoskeletal: no edema noted  Skin: Warm, dry, intact  Psych: Mood pleasant, affect congruent  Neuro:  Mental status: Awake, alert, attentive, oriented.  Speech: Normal   Gait: Normal  No focal neurological deficit           Data: All pertinent previous laboratory data reviewed     Recent Labs   Lab Test 06/02/21  0827      POTASSIUM 4.1   CHLORIDE 106   CO2 27   ANIONGAP 7   GLC 91   BUN 18   CR 1.14   JODI 9.2       Recent Labs   Lab Test 05/24/22  1159   WBC 7.3   RBC 6.20*   HGB 17.4   HCT 50.4   MCV 81   MCH 28.1   MCHC 34.5   RDW 12.7          Recent Labs   Lab Test 06/02/21  0827   PROTTOTAL 7.4   ALBUMIN 4.4   BILITOTAL 0.8   ALKPHOS 90   AST 28   ALT 49       TSH (mU/L)   Date Value   06/02/2021 0.98       No results found for: UAMP, UBARB, BENZODIAZEUR, UCANN, UCOC, OPIT, UPCP    No results found for: IRONSAT, [id number], ANDREW    No results found for: PH, PHARTERIAL, PO2, VJ4YSQHFOGY, SAT, PCO2, HCO3, BASEEXCESS, ALISON, BEB    Echocardiology: No results found for this or any previous visit (from the past 4320 hour(s)).    Chest x-ray: No results found for this or any previous visit from the past 365 days.      Chest CT: No results found for this or any previous visit from the past 365 days.      PFT: Most Recent [facility]ze Pulmonary Function Testing    No results found for: 20001    Karla Turner MD 9/12/2022

## 2022-09-12 NOTE — NURSING NOTE
Chief Complaint   Patient presents with     Sleep Problem     Snoring, difficulty falling and staying asleep       Initial /85   Pulse 82   Ht 1.829 m (6')   Wt 105.2 kg (232 lb)   SpO2 96%   BMI 31.46 kg/m   Estimated body mass index is 31.46 kg/m  as calculated from the following:    Height as of this encounter: 1.829 m (6').    Weight as of this encounter: 105.2 kg (232 lb).    Medication Reconciliation: complete    Neck circumference: 17.5 inches / 44 centimeters.  ESS 9  YOGESH 12  Debby Tejada MA

## 2022-09-23 ENCOUNTER — VIRTUAL VISIT (OUTPATIENT)
Dept: SLEEP MEDICINE | Facility: CLINIC | Age: 27
End: 2022-09-23
Attending: INTERNAL MEDICINE
Payer: COMMERCIAL

## 2022-09-23 DIAGNOSIS — G47.9 SLEEP DISTURBANCE: ICD-10-CM

## 2022-09-23 NOTE — PROGRESS NOTES
watchpat home sleep test device is registered, and was mailed out today via [employer] Priority mail.    Patient was notified via [employer].

## 2022-09-24 ENCOUNTER — DOCUMENTATION ONLY (OUTPATIENT)
Dept: SLEEP MEDICINE | Facility: CLINIC | Age: 27
End: 2022-09-24

## 2022-09-24 PROCEDURE — 95800 SLP STDY UNATTENDED: CPT | Performed by: INTERNAL MEDICINE

## 2022-09-25 ENCOUNTER — HEALTH MAINTENANCE LETTER (OUTPATIENT)
Age: 27
End: 2022-09-25

## 2022-09-26 NOTE — PROGRESS NOTES
Watch Pat has been scored using rule 1B, 4%.  Patient to follow up with provider to determine appropriate therapy.    PAT AHI: 10.9    Ordering Provider: Dr. Yung

## 2022-10-06 ENCOUNTER — MYC MEDICAL ADVICE (OUTPATIENT)
Dept: SLEEP MEDICINE | Facility: CLINIC | Age: 27
End: 2022-10-06

## 2022-10-06 DIAGNOSIS — G47.33 OSA (OBSTRUCTIVE SLEEP APNEA): Primary | ICD-10-CM

## 2022-10-07 ENCOUNTER — OFFICE VISIT (OUTPATIENT)
Dept: INTERNAL MEDICINE | Facility: CLINIC | Age: 27
End: 2022-10-07
Payer: COMMERCIAL

## 2022-10-07 VITALS
OXYGEN SATURATION: 98 % | RESPIRATION RATE: 20 BRPM | SYSTOLIC BLOOD PRESSURE: 139 MMHG | HEIGHT: 72 IN | HEART RATE: 71 BPM | WEIGHT: 235 LBS | DIASTOLIC BLOOD PRESSURE: 87 MMHG | BODY MASS INDEX: 31.83 KG/M2 | TEMPERATURE: 97.6 F

## 2022-10-07 DIAGNOSIS — Z23 NEED FOR IMMUNIZATION AGAINST INFLUENZA: ICD-10-CM

## 2022-10-07 DIAGNOSIS — Z23 HIGH PRIORITY FOR 2019-NCOV VACCINE: ICD-10-CM

## 2022-10-07 DIAGNOSIS — Z11.4 SCREENING FOR HIV (HUMAN IMMUNODEFICIENCY VIRUS): ICD-10-CM

## 2022-10-07 DIAGNOSIS — Z23 NEED FOR COVID-19 VACCINE: ICD-10-CM

## 2022-10-07 DIAGNOSIS — Z11.59 NEED FOR HEPATITIS C SCREENING TEST: ICD-10-CM

## 2022-10-07 DIAGNOSIS — G44.209 TENSION HEADACHE: Primary | ICD-10-CM

## 2022-10-07 PROCEDURE — 90686 IIV4 VACC NO PRSV 0.5 ML IM: CPT | Performed by: INTERNAL MEDICINE

## 2022-10-07 PROCEDURE — 0124A COVID-19,PF,PFIZER BOOSTER BIVALENT: CPT | Performed by: INTERNAL MEDICINE

## 2022-10-07 PROCEDURE — 99213 OFFICE O/P EST LOW 20 MIN: CPT | Mod: 25 | Performed by: INTERNAL MEDICINE

## 2022-10-07 PROCEDURE — 91312 COVID-19,PF,PFIZER BOOSTER BIVALENT: CPT | Performed by: INTERNAL MEDICINE

## 2022-10-07 PROCEDURE — 90471 IMMUNIZATION ADMIN: CPT | Performed by: INTERNAL MEDICINE

## 2022-10-07 ASSESSMENT — ENCOUNTER SYMPTOMS
GASTROINTESTINAL NEGATIVE: 1
CONSTITUTIONAL NEGATIVE: 1
RESPIRATORY NEGATIVE: 1
MUSCULOSKELETAL NEGATIVE: 1
HEADACHES: 1
CARDIOVASCULAR NEGATIVE: 1

## 2022-10-07 NOTE — PROGRESS NOTES
Assessment & Plan     Tension headache  At this time, patient is giving a description of tension headaches when describing his frequent headaches.  I did discuss with the patient that it is possible that his current psychiatric medications could be contributing to his headaches, but he did state that he would discuss his medications further with his psychiatrist.  Patient did seem to be reluctant to make any adjustments to his current medication regimen as his mental health seems to be stable.  Patient is not reporting any red flag symptoms that would necessitate imaging of his head.  I did spend some time discussing tension headaches as well as treatment options with him today.  Patient did ultimately elect to continue with his as needed use of Excedrin as he is concerned about medications that may interact with his lithium.    Need for immunization against influenza  Influenza immunization administered    Need for COVID-19 vaccine  Pfizer booster administered.        30 minutes spent on the date of the encounter doing chart review, history and exam, documentation and further activities per the note       BMI:   Estimated body mass index is 31.87 kg/m  as calculated from the following:    Height as of this encounter: 1.829 m (6').    Weight as of this encounter: 106.6 kg (235 lb).       See Patient Instructions    Return in about 4 weeks (around 11/4/2022) for Routine preventive.    Van Marinelli MD  [facility] YOSELYN Lacy is a 26 year old, presenting for the following health issues:  Headache      Patient is a 26-year-old  male who presents to the clinic to discuss headaches.  He reports that for the last 5 to 6 months he has had issues with almost daily headaches.  He describes the headaches as a pressure-like sensation that wraps around the back of his head, but it is most intense in his right parietal/occipital region.  Patient denies any exacerbating  "factors such as photophobia, phonophobia, or physical activity.  He does report that the headache responds well to Excedrin.  He denies any issues with nausea, vomiting, or change in vision.  Patient is also on had no issues with nighttime awakenings secondary to headache pain.  He does wear contacts and glasses, and his last vision examination was reportedly 2 months ago.  Patient states that there was no changes to his prescription lenses.  He does take sertraline, methylphenidate, and lithium as part of his mental health medication regimen.  Patient states that none of his dosages have been adjusted in quite some time.  Patient does report a significant mount of stress in his life, and he is concerned that that could be contributing to his headache.  He would like to have a flu shot and COVID booster today.    Headache     History of Present Illness       Headaches:   Since the patient's last clinic visit, headaches are: no change  The patient is getting headaches:  Daily  He is not able to do normal daily activities when he has a migraine.  The patient is taking the following rescue/relief medications:  Excedrin   Patient states \"I get some relief\" from the rescue/relief medications.   The patient is taking the following medications to prevent migraines:  No medications to prevent migraines  In the past 4 weeks, the patient has gone to an Urgent Care or Emergency Room 0 times times due to headaches.    He eats 0-1 servings of fruits and vegetables daily.He consumes 0 sweetened beverage(s) daily.He exercises with enough effort to increase his heart rate 30 to 60 minutes per day.  He exercises with enough effort to increase his heart rate 4 days per week.   He is taking medications regularly.       Review of Systems   Constitutional: Negative.    HENT: Negative.    Respiratory: Negative.    Cardiovascular: Negative.    Gastrointestinal: Negative.    Genitourinary: Negative.    Musculoskeletal: Negative.  "   Neurological: Positive for headaches.            Objective    Blood pressure 139/87, pulse 71, temperature 97.6  F (36.4  C), resp. rate 20, height 1.829 m (6'), weight 106.6 kg (235 lb), SpO2 98 %.      Physical Exam  Vitals reviewed.   Constitutional:       General: He is not in acute distress.     Appearance: He is well-developed.   HENT:      Right Ear: Tympanic membrane and external ear normal.      Left Ear: Tympanic membrane and external ear normal.      Nose: Nose normal.      Mouth/Throat:      Mouth: No oral lesions.      Pharynx: No oropharyngeal exudate.   Eyes:      General:         Right eye: No discharge.         Left eye: No discharge.      Conjunctiva/sclera: Conjunctivae normal.      Pupils: Pupils are equal, round, and reactive to light.   Neck:      Thyroid: No thyromegaly.      Trachea: No tracheal deviation.   Cardiovascular:      Rate and Rhythm: Normal rate and regular rhythm.      Pulses: Normal pulses.      Heart sounds: Normal heart sounds, S1 normal and S2 normal. No murmur heard.    No S3 or S4 sounds.   Pulmonary:      Effort: Pulmonary effort is normal. No respiratory distress.      Breath sounds: Normal breath sounds. No wheezing or rales.   Abdominal:      General: Bowel sounds are normal.      Palpations: Abdomen is soft. There is no mass.      Tenderness: There is no abdominal tenderness.   Musculoskeletal:         General: No deformity. Normal range of motion.      Cervical back: Neck supple.   Lymphadenopathy:      Cervical: No cervical adenopathy.   Skin:     General: Skin is warm and dry.      Findings: No lesion or rash.   Neurological:      Mental Status: He is alert and oriented to person, place, and time.      Motor: No abnormal muscle tone.      Deep Tendon Reflexes: Reflexes are normal and symmetric.   Psychiatric:         Speech: Speech normal.         Thought Content: Thought content normal.         Judgment: Judgment normal.

## 2022-10-07 NOTE — PROCEDURES
Watch[facility] - HOME SLEEP STUDY INTERPRETATION    Patient: Lux Bailey  MRN: [id number]  YOB: 1995  Study Date: 2022  Referring Provider: No Ref-Primary, Physician  Ordering Provider: Renae Turner MD    Chain of custody patient verification was not enabled.  Chain of custody verification was not present throughout the entire study.     Indications for Home Study: Lux Bailey is a 26 year old male with symptoms suggestive of obstructive sleep apnea.    Estimated body mass index is 31.46 kg/m  as calculated from the following:    Height as of 22: 1.829 m (6').    Weight as of 22: 105.2 kg (232 lb).  Total score - [place]: 9 (2022 11:22 AM)  STOP-BAN/8    Data: A full night home sleep study was performed recording the standard physiologic parameters including peripheral arterial tonometry (PAT), sound/snoring, body position,  movement, sound, and oxygen saturation by pulse oximetry. Pulse rate was estimated by oximetry recording. Sleep staging (wake, REM, light, and deep sleep) was derived from PAT signal.  This study was considered adequate based on > 4 hours of quality oximetry and respiratory recording. As specified by the AASM Manual for the Scoring of Sleep and Associated events, version 2.3, Rule VIII.D 1B, 4% oxygen desaturation scoring for hypopneas is used as a standard of care on all home sleep apnea testing.    Total Recording Time: 9 hrs, 59 min  Total Sleep Time: 8 hrs, 52 min  % of Sleep Time REM: 27.3%    Respiratory:  Snoring: Snoring was present.  Respiratory events: The PAT respiratory disturbance index [pRDI] was 17.6 events per hour.  The PAT apnea/hypopnea index [pAHI] was 10.9 events per hour.  AMIE was 7.3 events per hour.  During REM sleep the pAHI was 18.3 events per hour.  Sleep Associated Hypoxemia: sustained hypoxemia was not present. Mean oxygen saturation was 94%.  Minimum was 88%.  Time with saturation less than 88% was 0  minutes.    Heart Rate: By pulse oximetry, mean pulse rate was normal at 71 bpm.     Position: Percent of time spent: supine -28%, prone -0%, on right -37.2%, on left -34.8%.  pAHI was 26.7 per hour supine, n/a prone, 1.6 per hour on right side, and 7.8 per hour on left side.     Assessment:     Mild obstructive sleep apnea was present, pronounced during supine sleep position and during REM sleep.    Sleep associated hypoxemia was not present.    Recommendations:    Consider auto-CPAP at 5-15 cmH2O or positional therapy in combination with dental appliance through referral to sleep dentistry or referral to sleep ENT provider to obtain evaluation for possible surgical options.    Suggest optimizing sleep hygiene and avoiding sleep deprivation.    Weight management.    Diagnosis Code(s): Obstructive Sleep Apnea G47.33, Snoring R06.83    Karla Turner MD, October 6, 2022   Diplomate, American Board of Internal Medicine, Sleep Medicine

## 2022-10-11 ENCOUNTER — OFFICE VISIT (OUTPATIENT)
Dept: PSYCHIATRY | Facility: CLINIC | Age: 27
End: 2022-10-11
Attending: PSYCHIATRY & NEUROLOGY
Payer: COMMERCIAL

## 2022-10-11 VITALS
DIASTOLIC BLOOD PRESSURE: 94 MMHG | BODY MASS INDEX: 32.14 KG/M2 | WEIGHT: 237 LBS | HEART RATE: 114 BPM | SYSTOLIC BLOOD PRESSURE: 146 MMHG

## 2022-10-11 DIAGNOSIS — F90.0 ATTENTION DEFICIT HYPERACTIVITY DISORDER (ADHD), PREDOMINANTLY INATTENTIVE TYPE: ICD-10-CM

## 2022-10-11 DIAGNOSIS — F31.70 BIPOLAR I DISORDER IN REMISSION (H): Primary | ICD-10-CM

## 2022-10-11 PROCEDURE — G0463 HOSPITAL OUTPT CLINIC VISIT: HCPCS

## 2022-10-11 PROCEDURE — 99214 OFFICE O/P EST MOD 30 MIN: CPT | Mod: HN | Performed by: STUDENT IN AN ORGANIZED HEALTH CARE EDUCATION/TRAINING PROGRAM

## 2022-10-11 RX ORDER — LITHIUM CARBONATE 300 MG/1
300 TABLET, FILM COATED, EXTENDED RELEASE ORAL 2 TIMES DAILY
Qty: 60 TABLET | Refills: 1 | Status: SHIPPED | OUTPATIENT
Start: 2022-10-11 | End: 2022-11-08

## 2022-10-11 RX ORDER — METHYLPHENIDATE HYDROCHLORIDE 36 MG/1
36 TABLET ORAL EVERY MORNING
Qty: 30 TABLET | Refills: 0 | Status: SHIPPED | OUTPATIENT
Start: 2022-10-11 | End: 2022-11-08

## 2022-10-11 RX ORDER — GUANFACINE 1 MG/1
1 TABLET, EXTENDED RELEASE ORAL AT BEDTIME
Qty: 30 TABLET | Refills: 1 | Status: SHIPPED | OUTPATIENT
Start: 2022-10-11 | End: 2022-11-08

## 2022-10-11 ASSESSMENT — PAIN SCALES - GENERAL: PAINLEVEL: NO PAIN (0)

## 2022-10-11 NOTE — PROGRESS NOTES
"     [facility]  Psychiatry Clinic  MEDICAL PROGRESS NOTE     CARE TEAM:  PCP- Physician No Ref-Primary, Psychotherapist- None     Lux Bailey is a 26 year old who prefers the name Will and uses pronouns he, him, himself    This patient has been seen by previous providers in the [facility]. Sections of the history below have been copied from previous diagnostic assessments and were independently confirmed and updated as needed during this appointment.     DIAGNOSIS     Bipolar disorder I, severe, most recent episode manic, with psychotic features, in full remission  ADHD, inattentive type   Obsessive compulsive symptoms, r/o OCD    Obstructive sleep apnea     ASSESSMENT     Will reports that his mood has been stable since the last visit. Denies any marline or psychosis symptoms.     ADHD symptoms remain the same, and he still experiences mainly organizational issues and getting motivated and getting things done; he states he has trouble focusing and reports some concerns with \"working memory.\" He notes that he tends to forget details, getting worse during the past couple of months. He feels methylphenidate (CONCERTA) 36 mg daily has not been doing much for him. He does not want to go up on it since he previously did not tolerate 54 mg. We revisited the options of switching to methylphenidate (METADATE CD) 10 mg or adding guanfacine. We'll start guanfacine today to improve attention and cognitive concerns (it would also have secondary benefits with high BP). He will continue to monitor his BP (today BP is 146/94 mmHg). Encouraged the patient to contact his IM/PCP.     Will brought up his wish to try Vyvanse, maybe after his new insurance starts later in the fall.     He understands that concurrent use of stimulant and antidepressant medications places him at elevated risk for marline. He is not interested in increasing his lithium dose today. He states he has managed his mood " symptoms and no manic episodes since 2018 despite subtherapeutic lithium levels (0.5 on 5/24/2022).     Of note, Will was at higher doses of lithium in 2021 - 750 mg daily, which resulted in a significant increase in lethargy. At 900 mg daily, the patient experienced worsening lethargy, difficulty getting out of bed, difficulty getting out of bed, and considerable impairment in memory and concentration. His dose was reduced to 600 mg. He self-discontinued 600 mg daily dose on 10/2021, then agreed to resume at 300 mg BID on 11/2022.    Denies SI, plan, or intent. No safety concerns    Patient will monitor his symptoms and understand that if a reduced need for sleep, insomnia, racing thoughts, or significant changes in his mood occur, he will call the clinic or go to the emergency room. He denied suicidal ideation. No safety concerns today.    Per Dr. Petty's note on 6/17/2022:  Future considerations: History of stable mood despite subtherapeutic lithium levels. Consider increasing lithium given concurrent stimulant prescriptions. Consider trazodone or mirtazapine as alternative antidepressant medication.  Consider initiation of PDE 5 inhibitor if erectile dysfunction is a concern again. Consider VPA and re-evaluate other SGA trials.      [place] was [place] today:  Indicates taking controlled medication as prescribed.     PLAN                                                                                                                1) Meds-  - Continue methylphenidate (CONCERTA) 36 mg daily  - Continue lithium ER (LITHOBID) 300 mg BID  - Discontinue methylphenidate 5 mg daily at 2 PM (not taking it)  - Continue sertraline (ZOLAFT) 50 mg daily  - Start guanfacine (INTUNIV) 1 MG TB24 24 hr at bedtime     Non prescribed medications:  - Magnesium supplement and daily multivitamin qhs     2) Psychotherapy -  Interested in therapy for ADHD and hx of eating disorder.   Referral was made. Waiting for the new insurance to  "start.     3) Next due-  Labs- Lithium level of 0.5 on 5/24/22. Repeat lithium due on 11/2022; order placed.  EKG- as needed  Rating scales-PHQ 9 as needed     4) Referrals- None     5) Dispo- 4-6 weeks     PERTINENT BACKGROUND                                [most recent eval 07/24/22]     Previous diagnoses include depression, schizophrenia in 2016 when starting college, and ADHD in 2017.  He was initially diagnosed with schizophrenia and placed on antipsychotics for two years. In 2018 diagnosis changed from schizophrenia to bipolar disorder, and he was put on lamotrigine.  Lamotrigine was ineffective, and he was transitioned to lithium at the end of 2018.  Mood has been stable, with no manic episodes since starting lithium.  Mood symptoms have been well managed at subtherapeutic lithium levels.  He recalls he grew up in a family where \"mental illness wasn't real\" but struggled with depression starting in 1st or 2nd grade, \"regularly had teachers pull me aside and ask if everything was OK at home.\" He reports a history of chronic suicidal ideation since 3rd grade, particularly when depressed.  He has never \"actually attempted it.\" He has also had intrusive thoughts \"all the time\" about hurting or killing himself, despite not wanting to self-harm or do it.  Episodes began to get worse and worse, and he \"played the 'things are going to get better when I get to the next stage of life' game.\" He first started medications at the beginning of college, when he had his first manic episode, likely triggered by many factors, including lack of sleep and stress.  He has not had a manic episode since he started lithium in 2018/2019.  In college, he got care through the university clinic.  His psychiatrist advised him to immediately go off all his mood stabilizers, which made him concerned, and he wanted a second opinion.  After transferring care to [facility], he subsequently self-discontinued venlafaxine due to affective " "flattening.     Psych pertinent item history includes suicidal ideation, multiple psychotropic trials , psych hosp (x1, but several psych ER visits) and substance use: alcohol, cannabis and hallucinogens     SUBJECTIVE     - Will reports that his mood is good and stable. Denies any psychosis or marline symptoms.   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 Had an IM visit at a [facility] for daily headaches (pressure-like sensation) for the last 5-6 months; no red flag symptoms were found that would necessitate head imaging.   - ADHD symptoms remain the same, mainly organizational issues and getting motivated and getting things done; he states he has trouble focusing, has some concerns with \"working memory,\" and tends to forget details, getting worse during a couple of months. We discussed that adding guanfacine may help with these symptoms. Patient information from [facility] site for guanfacine was provided.   - Feels that methylphenidate (CONCERTA) 36 mg is not doing much, and Vyvanse might be a better choice  - Does not want to go up on methylphenidate since he did not tolerate 54 mg previously  - He understands that concurrent use of stimulant and antidepressant medications places him at elevated risk for marline.   - Denies SI, plan, or intent. No safety concerns today.     Recent Social History: see below    Recent Psych Symptoms:   Depression:  low energy.  Elevated:  none  Psychosis:  none  Anxiety:  excessive " worry and nervous/overwhelmed, attributes to ADHD concerns   Trauma Related:  none  Sleep: no and denies significant sleep issues, CARLOS, CPAP recommended  Other: no     Adverse Effects: none   Pertinent Negative Symptoms: No violent ideation, psychosis, hallucinations or marline, hypomania    Recent Substance Use:     Alcohol- none  Tobacco- none  Caffeine- 1 cup of coffee per day   Cannabis- none     Opioids- none           Narcan Kit- n/a   Other illicit drugs- none, no use in 2 years    Pertinent Negative Symptoms: No violent ideation, psychosis, hallucinations or marline, hypomania  Adverse Effects: none    PSYCH and SUBSTANCE USE Critical Summary Points since July 2021 6/18/21: Stopped effexor on own  7/26/21: Lithium increased to 750 mg for 2 weeks then to 900 mg if well-tolerated.  8/23/21: Lithium return to 600 mg daily.  Cariprazine 1.5 mg initiated  10/4/21: Concerta increased to 54 mg daily. Lithium self-discontinued  11/29/21: Lithium resumed at 300 mg qam for 14 days, then increase to 300 BID. Concerta decreased to 36 mg daily.  1/3/22: No changes  3/4/22: Start sertraline 50 mg daily  4/1/22: No medication changes, sleep medicine referral placed  5/6/22: Start methylphenidate 5 mg daily at 2 PM  6/17/22: No changes    7/25/2022: No medication change today  9/6/2022: No medication changes today  10/11/2022: - Started guanfacine (INTUNIV) 1 MG TB24 24 hr at bedtime, discontinued methylphenidate 5 mg daily at 2 PM (patient not taking it)      FAMILY and SOCIAL HISTORY                                 pt reported     Family Hx:   Father, paternal grandfather with alcoholism.  Maternal grandfather and mother have bipolar disorder.   Middle brother with ADHD and autism  Parkinson's and Alzheimer's on both sides of family. Nothing diagnosed in the immediate family.      Social Hx:  Financial/ Work- Working,  for [employer]  Partner/ - girlfriend  Children- None      Living situation-  "lives in [place] in apartment alone.   Social/ Spiritual Support- friends, girlfriend, family      Feels Safe at Home- yes   Legal- None     Trauma History (self-report)- None    Early History/Education- Born in [place], [place]. Moved often in childhood in [place], [place], [place], [place], [place]. Grew up poor on food stamps during childhood. Did well academically until high school, not many friends until high school. Oldest of 3 brothers. Didn't finish college d/t COVID and related job loss. Dual majored in informatics and math, minors in bio and computer science. 1 semester away from graduation. Lived on own since getting job.     PAST PSYCHIATRIC HISTORY     SIB- None  Suicide Attempt [#, most recent]- None  Suicidal Ideation Hx- yes, near overdose on APAP in 2018     Violence/Aggression Hx- None  Psychosis Hx- yes, during maria m, auditory hallucinations  Eating Disorder Hx- yes, restricting, binging in college. Better now.   Other- None     Psych Hosp [#, most recent]- 1, in 2018 for maria m in the context of insomnia and substance use (LSD and ketamine)  Commitment- None  ECT- None  Outpatient Programs - None  Other - N/A     PAST MED TRIALS     Medication Max Dose (mg) Dates / Duration Helpful? DC Reason / Adverse Effects?   lamotrigine       Lack of efficacy   lithium 900 current Y [place] \"on edge\" at higher doses (900), severe lethargy and memory impairment at doses above 600 mg. Despite side effects works better than other mood stabilizer trials.   bupropion       Maria M, while on lithium   escitalopram       Maria M, while on lithium, OCD   atomoxetine     N Hot flashes, nausea. 1 month trial   Adderall    8452-5059 Y \"I hated it\" moderate efficacy   lisdexamfetamine     N Lack of efficacy   dexadrine       Felt like a zombie, dissociated    lurasidone       Somnolence, cognitive slowing   methylphenidate 54 current Y Wears off quick, emotional blunting and low libido.    venlafaxine  150  2141-5952 Y  initially helpful, discontinued due to " "affect of flattening and emotional blunting.   cariprazine  1.5  2021 to current Y     risperidone       Cognitive slowing   aripirazole       Tremors, cognitive slowing   ziprazidone       Cognitive slowing   olanzapine       Cognitive slowing   cariprazine 1.5 2021-current Y Weight gain           PAST SUBSTANCE USE HISTORY     Past Use-  Numerous drugs in college, former cannabis use. LSD, ketamine, cannabis, DMT, psylocybin, research chemicals. No opioids or benzodiazepines  Treatment- #, most recent- no  Medical Consequences- no  Legal Consequences- no  Other- N/A     MEDICAL HISTORY and ALLERGY     ALLERGIES: Patient has no known allergies.    There is no problem list on file for this patient.       MEDICAL REVIEW OF SYSTEMS   Contraception- Vasectomy,  Pregnant- N/A  A comprehensive review of systems was performed and is negative other than noted in the HPI.     MEDICATIONS     Current Outpatient Medications   Medication Sig Dispense Refill     guanFACINE (INTUNIV) 1 MG TB24 24 hr tablet Take 1 tablet (1 mg) by mouth At Bedtime 30 tablet 1     lithium ER (LITHOBID) 300 MG CR tablet Take 1 tablet (300 mg) by mouth 2 times daily 60 tablet 1     methylphenidate (CONCERTA) 36 MG CR tablet Take 1 tablet (36 mg) by mouth every morning 30 tablet 0     sertraline (ZOLOFT) 50 MG tablet Take 1 tablet (50 mg) by mouth daily 30 tablet 1      VITALS   BP (!) 146/94   Pulse 114   Wt 107.5 kg (237 lb)   BMI 32.14 kg/m      MENTAL STATUS EXAM     Alertness: alert  and oriented  Appearance: adequately groomed  Behavior/Demeanor: cooperative, pleasant and calm, with good  eye contact   Speech: normal and regular rate and rhythm  Language: intact and no problems  Psychomotor: normal or unremarkable  Mood: \"pretty good\"  Affect: full range; congruent to: mood- yes, content- yes  Thought Process/Associations: unremarkable  Thought Content:  Reports none;  Denies suicidal & violent ideation and delusions  Perception:  Reports " none;  Denies none  Insight: good  Judgment: good  Cognition: does  appear grossly intact; formal cognitive testing was not done  Gait and Station: unremarkable     LABS and DATA     PHQ 5/14/2021 6/18/2021 1/3/2022   PHQ-9 Total Score 21 15 6   Q9: Thoughts of better off dead/self-harm past 2 weeks Nearly every day More than half the days Not at all   F/U: Thoughts of suicide or self-harm Yes Yes -   F/U: Self harm-plan No No -   F/U: Self-harm action No No -   F/U: Safety concerns No No -     9/6/2022: PHQ-9 total score is 9 (Q9: Not at all).   10/11/2022: PHQ-9 total score is 7 (Q9: Not at all)    Recent Labs   Lab Test 06/02/21  0827   GLC 91     No lab results found.  Recent Labs   Lab Test 06/02/21  0827   AST 28   ALT 49   ALKPHOS 90         Recent Labs   Lab Test 05/24/22  1159 06/02/21  0827   LITHIUM 0.5 0.42*     Recent Labs   Lab Test 06/02/21  0827   CR 1.14   GFRESTIMATED 90      POTASSIUM 4.1   JODI 9.2     Recent Labs   Lab Test 06/02/21  0827   SG 1.025     Recent Labs   Lab Test 06/02/21  0827   TSH 0.98     Recent Labs   Lab Test 05/24/22  1159 06/02/21  0827   WBC 7.3 5.6   ANEU  --  3.2     No ECG in the chart.      PSYCHOTROPIC DRUG INTERACTIONS                                                       PSYCHCLINICDDI     Additive for serotonin syndrome risk: lithium + sertraline    Guanfacine + methylphenidate may affect heart rhythm      MANAGEMENT:  Monitoring for adverse effects, routine vitals and routine labs     RISK STATEMENT for SAFETY     Will did not appear to be an imminent safety risk to self or others.    TREATMENT RISK STATEMENT: The risks, benefits, alternatives and potential adverse effects have been discussed and are understood by the pt. The pt understands the risks of using street drugs or alcohol. There are no medical contraindications, the pt agrees to treatment with the ability to do so. The pt knows to call the clinic for any problems or to access emergency care if  needed.  Medical and substance use concerns are documented above.  Psychotropic drug interaction check was done, including changes made today.     PROVIDER: Anastasia Perrin MD, PhD    Patient is discussed with Dr. Alamo.     Patient not staffed in clinic.  Note will be reviewed and signed by supervisor Dr. Lozano.

## 2022-11-08 ENCOUNTER — LAB (OUTPATIENT)
Dept: LAB | Facility: CLINIC | Age: 27
End: 2022-11-08
Attending: PSYCHIATRY & NEUROLOGY
Payer: COMMERCIAL

## 2022-11-08 ENCOUNTER — OFFICE VISIT (OUTPATIENT)
Dept: PSYCHIATRY | Facility: CLINIC | Age: 27
End: 2022-11-08
Attending: PSYCHIATRY & NEUROLOGY
Payer: COMMERCIAL

## 2022-11-08 VITALS
DIASTOLIC BLOOD PRESSURE: 94 MMHG | WEIGHT: 232.6 LBS | HEART RATE: 79 BPM | SYSTOLIC BLOOD PRESSURE: 137 MMHG | BODY MASS INDEX: 31.55 KG/M2

## 2022-11-08 DIAGNOSIS — F90.0 ATTENTION DEFICIT HYPERACTIVITY DISORDER (ADHD), PREDOMINANTLY INATTENTIVE TYPE: Primary | ICD-10-CM

## 2022-11-08 DIAGNOSIS — F31.70 BIPOLAR I DISORDER IN REMISSION (H): ICD-10-CM

## 2022-11-08 DIAGNOSIS — G47.33 OSA (OBSTRUCTIVE SLEEP APNEA): ICD-10-CM

## 2022-11-08 LAB
ALBUMIN SERPL-MCNC: 4.6 G/DL (ref 3.4–5)
ALP SERPL-CCNC: 128 U/L (ref 40–150)
ALT SERPL W P-5'-P-CCNC: 54 U/L (ref 0–70)
ANION GAP SERPL CALCULATED.3IONS-SCNC: 7 MMOL/L (ref 3–14)
AST SERPL W P-5'-P-CCNC: 23 U/L (ref 0–45)
BASOPHILS # BLD AUTO: 0 10E3/UL (ref 0–0.2)
BASOPHILS NFR BLD AUTO: 0 %
BILIRUB SERPL-MCNC: 0.6 MG/DL (ref 0.2–1.3)
BUN SERPL-MCNC: 21 MG/DL (ref 7–30)
CALCIUM SERPL-MCNC: 10 MG/DL (ref 8.5–10.1)
CHLORIDE BLD-SCNC: 106 MMOL/L (ref 94–109)
CO2 SERPL-SCNC: 29 MMOL/L (ref 20–32)
CREAT SERPL-MCNC: 1.25 MG/DL (ref 0.66–1.25)
EOSINOPHIL # BLD AUTO: 0.2 10E3/UL (ref 0–0.7)
EOSINOPHIL NFR BLD AUTO: 2 %
ERYTHROCYTE [DISTWIDTH] IN BLOOD BY AUTOMATED COUNT: 12.6 % (ref 10–15)
GFR SERPL CREATININE-BSD FRML MDRD: 82 ML/MIN/1.73M2
GLUCOSE BLD-MCNC: 96 MG/DL (ref 70–99)
HCT VFR BLD AUTO: 50.8 % (ref 40–53)
HGB BLD-MCNC: 17.6 G/DL (ref 13.3–17.7)
IMM GRANULOCYTES # BLD: 0.1 10E3/UL
IMM GRANULOCYTES NFR BLD: 1 %
LYMPHOCYTES # BLD AUTO: 2.1 10E3/UL (ref 0.8–5.3)
LYMPHOCYTES NFR BLD AUTO: 30 %
MCH RBC QN AUTO: 27.9 PG (ref 26.5–33)
MCHC RBC AUTO-ENTMCNC: 34.6 G/DL (ref 31.5–36.5)
MCV RBC AUTO: 81 FL (ref 78–100)
MONOCYTES # BLD AUTO: 0.4 10E3/UL (ref 0–1.3)
MONOCYTES NFR BLD AUTO: 5 %
NEUTROPHILS # BLD AUTO: 4.3 10E3/UL (ref 1.6–8.3)
NEUTROPHILS NFR BLD AUTO: 62 %
NRBC # BLD AUTO: 0 10E3/UL
NRBC BLD AUTO-RTO: 0 /100
PLATELET # BLD AUTO: 205 10E3/UL (ref 150–450)
POTASSIUM BLD-SCNC: 5.2 MMOL/L (ref 3.4–5.3)
PROT SERPL-MCNC: 8.1 G/DL (ref 6.8–8.8)
RBC # BLD AUTO: 6.3 10E6/UL (ref 4.4–5.9)
SODIUM SERPL-SCNC: 142 MMOL/L (ref 133–144)
SP GR UR STRIP: 1.02 (ref 1–1.03)
TSH SERPL DL<=0.005 MIU/L-ACNC: 1.81 MU/L (ref 0.4–4)
WBC # BLD AUTO: 6.9 10E3/UL (ref 4–11)

## 2022-11-08 PROCEDURE — 85004 AUTOMATED DIFF WBC COUNT: CPT

## 2022-11-08 PROCEDURE — 80053 COMPREHEN METABOLIC PANEL: CPT

## 2022-11-08 PROCEDURE — 36415 COLL VENOUS BLD VENIPUNCTURE: CPT

## 2022-11-08 PROCEDURE — 82040 ASSAY OF SERUM ALBUMIN: CPT

## 2022-11-08 PROCEDURE — 99214 OFFICE O/P EST MOD 30 MIN: CPT | Mod: GC | Performed by: STUDENT IN AN ORGANIZED HEALTH CARE EDUCATION/TRAINING PROGRAM

## 2022-11-08 PROCEDURE — 84443 ASSAY THYROID STIM HORMONE: CPT

## 2022-11-08 PROCEDURE — 81003 URINALYSIS AUTO W/O SCOPE: CPT

## 2022-11-08 RX ORDER — GUANFACINE 1 MG/1
1 TABLET, EXTENDED RELEASE ORAL AT BEDTIME
Qty: 30 TABLET | Refills: 1 | Status: SHIPPED | OUTPATIENT
Start: 2022-11-08 | End: 2022-11-18

## 2022-11-08 RX ORDER — METHYLPHENIDATE HYDROCHLORIDE 36 MG/1
36 TABLET ORAL EVERY MORNING
Qty: 30 TABLET | Refills: 0 | Status: SHIPPED | OUTPATIENT
Start: 2022-11-08 | End: 2022-11-18

## 2022-11-08 RX ORDER — LITHIUM CARBONATE 300 MG/1
300 TABLET, FILM COATED, EXTENDED RELEASE ORAL 2 TIMES DAILY
Qty: 60 TABLET | Refills: 1 | Status: SHIPPED | OUTPATIENT
Start: 2022-11-08 | End: 2022-11-18

## 2022-11-08 ASSESSMENT — PAIN SCALES - GENERAL: PAINLEVEL: NO PAIN (0)

## 2022-11-08 NOTE — PROGRESS NOTES
[facility]  Psychiatry Clinic  MEDICAL PROGRESS NOTE     CARE TEAM:  PCP- Physician No Ref-Primary, Psychotherapist- None     Lux Bailey is a 27 year old who prefers the name Regino and uses pronouns he, him, himself    This patient has been seen by previous providers in the [facility]. Sections of the history below have been copied from previous diagnostic assessments and were independently confirmed and updated as needed during this appointment.     DIAGNOSIS     Bipolar disorder I, severe, most recent episode manic, with psychotic features, in full remission  ADHD, inattentive type   Obsessive compulsive symptoms, r/o OCD    Obstructive sleep apnea, on CPAP starting next week      ASSESSMENT     Will reports that his mood has been stable since the last visit. Denies any marline or psychosis symptoms. His ADHD symptoms are mainly organizational issues, getting motivated, and getting things done. He is on methylphenidate (CONCERTA) 36 mg daily and does not want to go up on it since he previously did not tolerate 54 mg. He states that starting guanfacine helped (it was added at the last visit for attention and cognitive concerns). He will continue to monitor his BP (today, BP is 137/94 mmHg). He will start using his CPAP next week, which is expected to help with the elevated BP. He is encouraged to contact his PCP, if high BP persists.      He understands that concurrent use of stimulant and antidepressant medications places him at elevated risk for marline. He is not interested in increasing his lithium dose today. He states he has managed his mood symptoms and no manic episodes since 2018 despite subtherapeutic lithium levels (0.5 on 5/24/2022).     Of note, Regino was at higher doses of lithium in 2021 - 750 mg daily, which resulted in a significant increase in lethargy. At 900 mg daily, the patient experienced worsening lethargy, difficulty getting out of bed,  difficulty getting out of bed, and considerable impairment in memory and concentration. His dose was reduced to 600 mg. He self-discontinued 600 mg daily dose on 10/2021, then agreed to resume at 300 mg BID on 11/2022.    Denies SI, plan, or intent. No safety concerns    Patient will monitor his symptoms and understand that if a reduced need for sleep, insomnia, racing thoughts, or significant changes in his mood occur, he will call the clinic or go to the emergency room. He denied suicidal ideation. No safety concerns today.    Per Dr. Petty's note on 6/17/2022:  Future considerations: History of stable mood despite subtherapeutic lithium levels. Consider increasing lithium given concurrent stimulant prescriptions. Consider trazodone or mirtazapine as alternative antidepressant medication.  Consider initiation of PDE 5 inhibitor if erectile dysfunction is a concern again. Consider VPA and re-evaluate other SGA trials.      [place] was checked today:  Indicates taking controlled medication as prescribed.     PLAN                                                                                                                1) Meds-   - Continue methylphenidate (CONCERTA) 36 mg daily  - Continue lithium ER (LITHOBID) 300 mg BID  - Continue sertraline (ZOLAFT) 50 mg daily  - Continue guanfacine (INTUNIV) 1 MG TB24 24 hr at bedtime     Non prescribed medications:  - Magnesium supplement and daily multivitamin qhs     2) Psychotherapy -  Interested in therapy for ADHD and hx of eating disorder. Referral was made. Waiting for the new insurance to start.     3) Next due-  Labs- [place] labs ordered today. Lithium level of 0.5 on 5/24/22.   EKG- as needed  Rating scales-PHQ 9 as needed     4) Referrals- None     5) Dispo- 4-6 weeks     PERTINENT BACKGROUND                                [most recent eval 07/24/22]     Previous diagnoses include depression, schizophrenia in 2016 when starting college, and ADHD in 2017.  He  "was initially diagnosed with schizophrenia and placed on antipsychotics for two years. In 2018 diagnosis changed from schizophrenia to bipolar disorder, and he was put on lamotrigine.  Lamotrigine was ineffective, and he was transitioned to lithium at the end of 2018.  Mood has been stable, with no manic episodes since starting lithium.  Mood symptoms have been well managed at subtherapeutic lithium levels.  He recalls he grew up in a family where \"mental illness wasn't real\" but struggled with depression starting in 1st or 2nd grade, \"regularly had teachers pull me aside and ask if everything was OK at home.\" He reports a history of chronic suicidal ideation since 3rd grade, particularly when depressed.  He has never \"actually attempted it.\" He has also had intrusive thoughts \"all the time\" about hurting or killing himself, despite not wanting to self-harm or do it.  Episodes began to get worse and worse, and he \"played the 'things are going to get better when I get to the next stage of life' game.\" He first started medications at the beginning of college, when he had his first manic episode, likely triggered by many factors, including lack of sleep and stress.  He has not had a manic episode since he started lithium in 2018/2019.  In college, he got care through the university clinic.  His psychiatrist advised him to immediately go off all his mood stabilizers, which made him concerned, and he wanted a second opinion.  After transferring care to [facility], he subsequently self-discontinued venlafaxine due to affective flattening.     Psych pertinent item history includes suicidal ideation, multiple psychotropic trials , psych hosp (x1, but several psych ER visits) and substance use: alcohol, cannabis and hallucinogens     SUBJECTIVE     Today:   - Will reports that his mood is \"good.\"   - Guanfacine \"helped feeling calm and collected.\"  - Denies any new symptoms, no psychosis or marline or depressive symptoms.  - " Planning to move in March to [facility] with his girlfriend. This was their plan all along. He lived there before.   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 Family hx of high BP. He will follow up with PCP.   - Plans to use his CPAP next week after receiving it; he states that during his visit with sleep medicine, he was told it would help with elevated BP.   - He has been eating healthy and lost some weight.   - No medication changes.   - Denies SI, plan, or intent. No safety concerns today    Recent Social History: see below    Recent Psych Symptoms:   Depression:  denies.  Elevated:  none  Psychosis:  none  Anxiety:  excessive worry, attributes to ADHD concerns   Trauma Related:  none  Sleep: no and denies significant sleep issues, CARLOS  Other: no     Adverse Effects: none   Pertinent Negative Symptoms: No violent ideation, psychosis, hallucinations or marline, hypomania    Recent Substance Use:     Alcohol- none  Tobacco- none  Caffeine- 1 cup of coffee per day   Cannabis- none     Opioids- none           Narcan Kit- n/a   Other illicit drugs- none, no use in 2 years    Pertinent Negative Symptoms: No violent ideation, psychosis, hallucinations or marline, hypomania  Adverse Effects: none    PSYCH and SUBSTANCE USE Critical Summary Points since July 2021 6/18/21: Stopped effexor on own  7/26/21: Lithium increased to 750 mg for 2 weeks then to 900 mg if well-tolerated.  8/23/21: Lithium return to 600 mg daily.  Cariprazine 1.5 mg initiated  10/4/21: Concerta increased to 54 mg daily. Lithium self-discontinued  11/29/21: Lithium resumed at 300 mg qam for 14 days, then increase to 300 BID. Concerta decreased to 36 mg daily.  1/3/22: No changes  3/4/22: Start sertraline 50 mg daily  4/1/22: No medication changes, sleep medicine referral placed  5/6/22: Start methylphenidate 5 mg daily at 2 PM  6/17/22: No changes    7/25/2022: No  medication change today  9/6/2022: No medication changes today  10/11/2022: - Started guanfacine (INTUNIV) 1 MG TB24 24 hr at bedtime, discontinued methylphenidate 5 mg daily at 2 PM (patient not taking it)   11/7/2022: No medication changes.      FAMILY and SOCIAL HISTORY                                 pt reported     Family Hx:   Father, paternal grandfather with alcoholism.  Maternal grandfather and mother have bipolar disorder.   Middle brother with ADHD and autism  Parkinson's and Alzheimer's on both sides of family. Nothing diagnosed in the immediate family.      Social Hx:  Financial/ Work- Working,  for [employer]  Partner/ - girlfriend  Children- None      Living situation- lives in [place] in apartment alone.   Social/ Spiritual Support- friends, girlfriend, family      Feels Safe at Home- yes   Legal- None     Trauma History (self-report)- None    Early History/Education- Born in [place], [place]. Moved often in childhood in [place], [place], [place], [place], [place]. Grew up poor on food stamps during childhood. Did well academically until high school, not many friends until high school. Oldest of 3 brothers. Didn't finish college d/t COVID and related job loss. Dual majored in informatics and math, minors in bio and computer science. 1 semester away from graduation. Lived on own since getting job.     PAST PSYCHIATRIC HISTORY     SIB- None  Suicide Attempt [#, most recent]- None  Suicidal Ideation Hx- yes, near overdose on APAP in 2018     Violence/Aggression Hx- None  Psychosis Hx- yes, during marline, auditory hallucinations  Eating Disorder Hx- yes, restricting, binging in college. Better now.   Other- None     Psych Hosp [#, most recent]- 1, in 2018 for marline in the context of insomnia and substance use (LSD and ketamine)  Commitment- None  ECT- None  Outpatient Programs - None  Other - N/A     PAST MED TRIALS     Medication Max Dose (mg) Dates / Duration Helpful? DC Reason / Adverse Effects?   lamotrigine      "  Lack of efficacy   lithium 900 current Y [place] \"on edge\" at higher doses (900), severe lethargy and memory impairment at doses above 600 mg. Despite side effects works better than other mood stabilizer trials.   bupropion       Maria M, while on lithium   escitalopram       Maria M, while on lithium, OCD   atomoxetine     N Hot flashes, nausea. 1 month trial   Adderall    6185-1475 Y \"I hated it\" moderate efficacy   lisdexamfetamine     N Lack of efficacy   dexadrine       Felt like a zombie, dissociated    lurasidone       Somnolence, cognitive slowing   methylphenidate 54 current Y Wears off quick, emotional blunting and low libido.    venlafaxine  150  3291-1917 Y  initially helpful, discontinued due to affect of flattening and emotional blunting.   cariprazine  1.5  2021 to current Y     risperidone       Cognitive slowing   aripirazole       Tremors, cognitive slowing   ziprazidone       Cognitive slowing   olanzapine       Cognitive slowing   cariprazine 1.5 2021-current Y Weight gain         PAST SUBSTANCE USE HISTORY     Past Use-  Numerous drugs in college, former cannabis use. LSD, ketamine, cannabis, DMT, psylocybin, research chemicals. No opioids or benzodiazepines  Treatment- #, most recent- no  Medical Consequences- no  Legal Consequences- no  Other- N/A     MEDICAL HISTORY and ALLERGY     ALLERGIES: Patient has no known allergies.    There is no problem list on file for this patient.       MEDICAL REVIEW OF SYSTEMS     Contraception- Vasectomy,  Pregnant- N/A    A comprehensive review of systems was performed and is negative other than noted in the HPI.     MEDICATIONS     Current Outpatient Medications   Medication Sig Dispense Refill     guanFACINE (INTUNIV) 1 MG TB24 24 hr tablet Take 1 tablet (1 mg) by mouth At Bedtime 30 tablet 1     lithium ER (LITHOBID) 300 MG CR tablet Take 1 tablet (300 mg) by mouth 2 times daily 60 tablet 1     methylphenidate (CONCERTA) 36 MG CR tablet Take 1 tablet (36 mg) by " "mouth every morning 30 tablet 0     sertraline (ZOLOFT) 50 MG tablet Take 1 tablet (50 mg) by mouth daily 30 tablet 1      VITALS   BP (!) 137/94   Pulse 79   Wt 105.5 kg (232 lb 9.6 oz)   BMI 31.55 kg/m       Pulse Readings from Last 5 Encounters:   11/08/22 79   10/11/22 114   10/07/22 71   09/12/22 82   09/06/22 116     Wt Readings from Last 5 Encounters:   11/08/22 105.5 kg (232 lb 9.6 oz)   10/11/22 107.5 kg (237 lb)   10/07/22 106.6 kg (235 lb)   09/12/22 105.2 kg (232 lb)   09/06/22 105.8 kg (233 lb 3.2 oz)     BP Readings from Last 5 Encounters:   11/08/22 (!) 137/94   10/11/22 (!) 146/94   10/07/22 139/87   09/12/22 127/85   09/06/22 (!) 134/90        MENTAL STATUS EXAM     Alertness: alert  and oriented  Appearance: adequately groomed  Behavior/Demeanor: cooperative, pleasant and calm, with good  eye contact   Speech: normal and regular rate and rhythm  Language: intact and no problems  Psychomotor: normal or unremarkable  Mood: \"pretty good\"  Affect: full range; congruent to: mood- yes, content- yes  Thought Process/Associations: unremarkable  Thought Content:  Reports none;  Denies suicidal & violent ideation and delusions  Perception:  Reports none;  Denies none  Insight: good  Judgment: good  Cognition: does  appear grossly intact; formal cognitive testing was not done  Gait and Station: unremarkable     LABS and DATA     PHQ 5/14/2021 6/18/2021 1/3/2022   PHQ-9 Total Score 21 15 6   Q9: Thoughts of better off dead/self-harm past 2 weeks Nearly every day More than half the days Not at all   F/U: Thoughts of suicide or self-harm Yes Yes -   F/U: Self harm-plan No No -   F/U: Self-harm action No No -   F/U: Safety concerns No No -     9/6/2022:    PHQ-9 total score is 9 (Q9: Not at all).   10/11/2022: PHQ-9 total score is 7 (Q9: Not at all)  11/7/2022:   PHQ-9 total score is 6 (Q9: Not at all)    Recent Labs   Lab Test 11/08/22  0844 06/02/21  0827   GLC 96 91     No lab results found.  Recent Labs "   Lab Test 11/08/22 0844 06/02/21 0827   AST 23 28   ALT 54 49   ALKPHOS 128 90       Recent Labs   Lab Test 05/24/22  1159 06/02/21 0827   LITHIUM 0.5 0.42*     Recent Labs   Lab Test 11/08/22 0844 06/02/21 0827   CR 1.25 1.14   GFRESTIMATED 82 90    140   POTASSIUM 5.2 4.1   JODI 10.0 9.2     Recent Labs   Lab Test 11/08/22 0844 06/02/21 0827   SG 1.020 1.025     Recent Labs   Lab Test 11/08/22 0844 06/02/21 0827   TSH 1.81 0.98     Recent Labs   Lab Test 11/08/22 0844 05/24/22 1159 06/02/21 0827   WBC 6.9 7.3 5.6   ANEU  --   --  3.2     No ECG in the chart.      PSYCHOTROPIC DRUG INTERACTIONS                                                       PSYCHCLINICDDI     Additive for serotonin syndrome risk: lithium + sertraline    Guanfacine + methylphenidate may affect heart rhythm      MANAGEMENT:  Monitoring for adverse effects, routine vitals and routine labs     RISK STATEMENT for SAFETY     Will did not appear to be an imminent safety risk to self or others.    TREATMENT RISK STATEMENT: The risks, benefits, alternatives and potential adverse effects have been discussed and are understood by the pt. The pt understands the risks of using street drugs or alcohol. There are no medical contraindications, the pt agrees to treatment with the ability to do so. The pt knows to call the clinic for any problems or to access emergency care if needed.  Medical and substance use concerns are documented above.  Psychotropic drug interaction check was done, including changes made today.     PROVIDER: Anastasia Perrin MD, PhD    Patient staffed in clinic with Dr. Alamo who will sign the note.  Supervisor is Dr. Alamo.

## 2022-11-14 ENCOUNTER — OFFICE VISIT (OUTPATIENT)
Dept: SLEEP MEDICINE | Facility: CLINIC | Age: 27
End: 2022-11-14
Payer: COMMERCIAL

## 2022-11-14 VITALS
BODY MASS INDEX: 32.23 KG/M2 | HEIGHT: 72 IN | OXYGEN SATURATION: 96 % | HEART RATE: 115 BPM | SYSTOLIC BLOOD PRESSURE: 127 MMHG | WEIGHT: 238 LBS | DIASTOLIC BLOOD PRESSURE: 76 MMHG

## 2022-11-14 DIAGNOSIS — G47.33 OSA (OBSTRUCTIVE SLEEP APNEA): Primary | ICD-10-CM

## 2022-11-14 PROCEDURE — 99213 OFFICE O/P EST LOW 20 MIN: CPT | Performed by: INTERNAL MEDICINE

## 2022-11-14 NOTE — NURSING NOTE
"Chief Complaint   Patient presents with     Sleep Problem     Follow up sleep study        Initial /76   Pulse 115   Ht 1.829 m (6' 0.01\")   Wt 108 kg (238 lb)   SpO2 96%   BMI 32.27 kg/m   Estimated body mass index is 32.27 kg/m  as calculated from the following:    Height as of this encounter: 1.829 m (6' 0.01\").    Weight as of this encounter: 108 kg (238 lb).    Medication Reconciliation: complete  ESS 6   YOGESH 11  Debby Tejada MA         "

## 2022-11-15 NOTE — PROGRESS NOTES
"       [facility]  Sleep clinic follow-up visit note      Date on this visit: November 14, 2022       Chief complaint: Review results of recently obtained home sleep  study     Lux Bailey is a 27 year old male who previously presented to sleep clinic with symptoms suggestive of obstructive sleep apnea.  Home sleep study was obtained on 9/24/22  to evaluate for CARLOS.  He presents to sleep clinic today to review the test results and to discuss plan of care.    Home sleep study report:   Study date:9/24/22  Total Recording Time: 9 hrs, 59 min  Total Sleep Time: 8 hrs, 52 min  % of Sleep Time REM: 27.3%     Respiratory:  Snoring: Snoring was present.  Respiratory events: The PAT respiratory disturbance index [pRDI] was 17.6 events per hour.  The PAT apnea/hypopnea index [pAHI] was 10.9 events per hour.  AMIE was 7.3 events per hour.  During REM sleep the pAHI was 18.3 events per hour.  Sleep Associated Hypoxemia: sustained hypoxemia was not present. Mean oxygen saturation was 94%.  Minimum was 88%.  Time with saturation less than 88% was 0 minutes.     Heart Rate: By pulse oximetry, mean pulse rate was normal at 71 bpm.      Position: Percent of time spent: supine -28%, prone -0%, on right -37.2%, on left -34.8%.  pAHI was 26.7 per hour supine, n/a prone, 1.6 per hour on right side, and 7.8 per hour on left side.      Assessment:     Mild obstructive sleep apnea was present, pronounced during supine sleep position and during REM sleep.    Sleep associated hypoxemia was not present.     Test results were discussed with the patient in detail.    Past medical/surgical history, family history, social history, medications and allergies were reviewed.            Physical Examination:    /76   Pulse 115   Ht 1.829 m (6' 0.01\")   Wt 108 kg (238 lb)   SpO2 96%   BMI 32.27 kg/m    General: Pleasant. Cooperative. In no apparent distress.  Pulmonary: Able to speak in full sentences easily. No cough or " wheeze.   Neurologic: Alert, oriented x3.  Psychiatric: Mood euthymic. Affect congruent with full range and intensity.             Assessment and Plan:   Mild obstructive sleep apnea was present, pronounced during supine sleep position and during REM sleep, without sleep associated hypoxemia.  The test results were discussed with the patient in detail.  We discussed the different options for the treatment of CARLOS and patient was interested in CPAP treatment. DME orders were generated for auto titrating CPAP with pressure settings 5 to 15 cm water. We discussed about the waiting list due to current CPAP supply shortage. Patient was instructed to use one of devices slumber bump or sleep noodle, to avoid supine sleep in the interim until the CPAP device becomes available.  After obtaining the CPAP, he was recommended to use the device regularly during sleep and get back to us if any concerns.   Patient mentioned that he is planning to relocate to [place] in the next couple of months. The DME orders were faxed to [facility] so that he will be easy for him when he relocates to [place] and can establish continuity of care with a local sleep provider.  Patient was instructed to call the sleep clinic after obtaining the CPAP equipment to schedule video visit in approximately 5-6 after initiating the CPAP therapy to review compliance measures if he is still in the [facility].  Otherwise he will follow-up with local sleep provider in [place].    Obesity: We discussed weight management with diet and exercise    Patient was strongly advised to avoid driving, operating any heavy machinery or other hazardous situations while drowsy or sleepy.  Patient was counseled on the importance of driving while alert, to pull over if drowsy, or nap before getting into the vehicle if sleepy. '    The above note was dictated using voice recognition software. Although reviewed after completion, some word and grammatical error may remain .  "Please contact the author for any clarifications.     \"I spent a total of 25 minutes face to face with Lux Bailey during today's video visit. Most of this time was spent counseling the patient and  coordinating care regarding CARLOS,  CPAP treatment, reviewing results of home sleep study and chart review., including documentation and further activities as noted above.\"      Karla Turner MD  [facility]  [address] , [place] [address]       "

## 2022-11-18 ENCOUNTER — MYC MEDICAL ADVICE (OUTPATIENT)
Dept: PSYCHIATRY | Facility: CLINIC | Age: 27
End: 2022-11-18

## 2022-11-18 ENCOUNTER — DOCUMENTATION ONLY (OUTPATIENT)
Dept: SLEEP MEDICINE | Facility: CLINIC | Age: 27
End: 2022-11-18

## 2022-11-18 DIAGNOSIS — F31.70 BIPOLAR I DISORDER IN REMISSION (H): ICD-10-CM

## 2022-11-18 DIAGNOSIS — F90.0 ATTENTION DEFICIT HYPERACTIVITY DISORDER (ADHD), PREDOMINANTLY INATTENTIVE TYPE: ICD-10-CM

## 2022-11-18 RX ORDER — METHYLPHENIDATE HYDROCHLORIDE 36 MG/1
36 TABLET ORAL EVERY MORNING
Qty: 30 TABLET | Refills: 0 | Status: SHIPPED | OUTPATIENT
Start: 2022-11-18 | End: 2023-01-12 | Stop reason: ALTCHOICE

## 2022-11-18 RX ORDER — LITHIUM CARBONATE 300 MG/1
300 TABLET, FILM COATED, EXTENDED RELEASE ORAL 2 TIMES DAILY
Qty: 60 TABLET | Refills: 1 | Status: SHIPPED | OUTPATIENT
Start: 2022-11-18 | End: 2022-12-10

## 2022-11-18 RX ORDER — GUANFACINE 1 MG/1
1 TABLET, EXTENDED RELEASE ORAL AT BEDTIME
Qty: 30 TABLET | Refills: 1 | Status: SHIPPED | OUTPATIENT
Start: 2022-11-18 | End: 2022-12-10

## 2022-11-18 NOTE — TELEPHONE ENCOUNTER
Prescriptions resent to [facility] due to insurance issues. Writer contacted [facility] to cancel previously sent prescriptions.

## 2022-12-06 NOTE — PROGRESS NOTES
[facility]  Psychiatry Clinic  MEDICAL PROGRESS NOTE     CARE TEAM:  PCP- Physician No Ref-Primary, Psychotherapist- None     Lux Bailey is a 27 year old who prefers the name Will and uses pronouns he, him, himself    This patient has been seen by previous providers in the [facility]. Sections of the history below have been copied from previous diagnostic assessments and were independently confirmed and updated as needed during this appointment.     DIAGNOSIS     Bipolar disorder I, severe, most recent episode manic, with psychotic features, in full remission  ADHD, inattentive type   Obsessive compulsive symptoms, r/o OCD    Obstructive sleep apnea, on CPAP starting next week      ASSESSMENT     Will reports that his mood has been stable since the last visit. Denies any marline or psychosis symptoms.     No significant change in his ADHD symptoms. He is on methylphenidate (Concerta) 36 mg daily for ADHD starting guanfacine a couple of months ago for attention, and cognitive concerns helped. He understands that concurrent use of stimulant and antidepressant medications places him at elevated risk for marline.     He stated that he could not refill his Concerta today due to a high co-pay. Getting new insurance in January with better coverage indicated that he has limited supply of Concerta and can get through December. Patient will contact the clinic if he continues to have issues with refills. A call was made to [facility] pharmacy in [place] to explore the patient's options for a lower-cost generic form. The pharmacist indicated that the lower-cost generic form is currently out of stock.     He will continue to monitor his BP (today, BP is 130/90 mmHg). He agreed to contact his PCP for his persistent elevated BP.     His mood has been stable despite subtherapeutic lithium levels (see labs below). No manic episodes since 2018. He is not interested in increasing his lithium  dose.     Of note, Will was at higher doses of lithium in 2021 - 750 mg daily, which resulted in a significant increase in lethargy. At 900 mg daily, the patient experienced worsening lethargy, difficulty getting out of bed, and considerable impairment in memory and concentration. His dose was reduced to 600 mg. He self-discontinued 600 mg daily dose on 10/2021, then agreed to resume at 300 mg BID on 11/2021.    Denies SI, plan, or intent. No safety concerns    Patient will monitor his symptoms and understand that if a reduced need for sleep, insomnia, racing thoughts, or significant changes in his mood occur, he will call the clinic or go to the emergency room. He denied suicidal ideation. No safety concerns today.    Per Dr. Petty's note on 6/17/2022:  Future considerations: History of stable mood despite subtherapeutic lithium levels. Consider increasing lithium given concurrent stimulant prescriptions. Consider trazodone or mirtazapine as alternative antidepressant medication.  Consider initiation of PDE 5 inhibitor if erectile dysfunction is a concern again. Consider VPA and re-evaluate other SGA trials.      [place] was checked today:  Indicates taking controlled medication as prescribed.     PLAN                                                                                                                1) Meds-   - Continue methylphenidate (CONCERTA) 36 mg daily  - Continue lithium ER (LITHOBID) 300 mg BID  - Continue sertraline (ZOLAFT) 50 mg daily  - Continue guanfacine (INTUNIV) 1 MG TB24 24 hr at bedtime     Non prescribed medications:  - Magnesium supplement and daily multivitamin qhs     2) Psychotherapy -  Interested in therapy for ADHD and hx of eating disorder. Referral was made. Waiting for the new insurance to start.     3) Next due-  Labs- as needed.    EKG- as needed  Rating scales-PHQ 9 as needed     4) Referrals- None     5) Dispo- 4-6 weeks     PERTINENT BACKGROUND                            "     [most recent eval 07/24/22]     Previous diagnoses include depression, schizophrenia in 2016 when starting college, and ADHD in 2017.  He was initially diagnosed with schizophrenia and placed on antipsychotics for two years. In 2018 diagnosis changed from schizophrenia to bipolar disorder, and he was put on lamotrigine. Lamotrigine was ineffective, and he was transitioned to lithium at the end of 2018.  Mood has been stable, with no manic episodes since starting lithium.  Mood symptoms have been well managed at subtherapeutic lithium levels.  He recalls he grew up in a family where \"mental illness wasn't real\" but struggled with depression starting in 1st or 2nd grade, \"regularly had teachers pull me aside and ask if everything was OK at home.\" He reports a history of chronic suicidal ideation since 3rd grade, particularly when depressed.  He has never \"actually attempted it.\" He has also had intrusive thoughts \"all the time\" about hurting or killing himself, despite not wanting to self-harm or do it.  Episodes began to get worse and worse, and he \"played the 'things are going to get better when I get to the next stage of life' game.\" He first started medications at the beginning of college, when he had his first manic episode, likely triggered by many factors, including lack of sleep and stress.  He has not had a manic episode since he started lithium in 2018/2019.  In college, he got care through the university clinic.  His psychiatrist advised him to immediately go off all his mood stabilizers, which made him concerned, and he wanted a second opinion.  After transferring care to [facility], he subsequently self-discontinued venlafaxine due to affective flattening.     Psych pertinent item history includes suicidal ideation, multiple psychotropic trials , psych hosp (x1, but several psych ER visits) and substance use: alcohol, cannabis and hallucinogens     SUBJECTIVE     - Will reports that he has been doing well " overall. Mood has been stable. No new symptoms.   - Denies psychosis, marline, or depressive symptoms.  - No significant changes in ADHD symptoms.  - Work is going well.   - Concerns with the high BP continue. Family hx of high BP. Agreed to follow up with his PCP.   - Could not  Concerta due to high co-pay; states he will get new insurance in January with a better medication coverage.   - Agreed to call his pharmacy to explore lower-cost options   - States that he has a sufficient supply of Concerta at home to get through until January   - Plans to move to [place] are moving forward  - Excited about the move and stressed out at the same time.   - Aware that our clinic can't manage his meds after he moves to [place]   - No medication changes.   - Denies SI, plan, or intent. No safety concerns today    Recent Social History: see below    Recent Psych Symptoms:   Depression:  low mood, feels from stress of work   Elevated:  none  Psychosis:  none  Anxiety:  excessive worry, attributes to ADHD concerns   Trauma Related:  none  Sleep: no and denies significant sleep issues, CARLOS  Other: no     Adverse Effects: none   Pertinent Negative Symptoms: No violent ideation, psychosis, hallucinations or marline, hypomania    Recent Substance Use:     Alcohol- none  Tobacco- none  Caffeine- 1 cup of coffee per day   Cannabis- none     Opioids- none           Narcan Kit- n/a   Other illicit drugs- none, no use in 2 years    Pertinent Negative Symptoms: No violent ideation, psychosis, hallucinations or marline, hypomania  Adverse Effects: none    PSYCH and SUBSTANCE USE Critical Summary Points since July 2021 6/18/21: Stopped effexor on own  7/26/21: Lithium increased to 750 mg for 2 weeks then to 900 mg if well-tolerated.  8/23/21: Lithium return to 600 mg daily.  Cariprazine 1.5 mg initiated  10/4/21: Concerta increased to 54 mg daily. Lithium self-discontinued  11/29/21: Lithium resumed at 300 mg qam for 14  days, then increase to 300 BID. Concerta decreased to 36 mg daily.  1/3/22: No changes  3/4/22: Start sertraline 50 mg daily  4/1/22: No medication changes, sleep medicine referral placed  5/6/22: Start methylphenidate 5 mg daily at 2 PM  6/17/22: No changes    7/25/2022: No medication change today  9/6/2022: No medication changes today  10/11/2022: - Started guanfacine (INTUNIV) 1 MG TB24 24 hr at bedtime, discontinued methylphenidate 5 mg daily at 2 PM (patient not taking it)   11/7/2022: No medication changes.   12/8/2022: No medication changes.      FAMILY and SOCIAL HISTORY                                 pt reported     Family Hx:   Father, paternal grandfather with alcoholism.  Maternal grandfather and mother have bipolar disorder.   Middle brother with ADHD and autism  Parkinson's and Alzheimer's on both sides of family. Nothing diagnosed in the immediate family.      Social Hx:  Financial/ Work- Working,  for [employer]  Partner/ - girlfriend  Children- None      Living situation- lives in [place] in apartment alone.   Social/ Spiritual Support- friends, girlfriend, family      Feels Safe at Home- yes   Legal- None     Trauma History (self-report)- None    Early History/Education- Born in [place], [place]. Moved often in childhood in [place], [place], [place], [place], [place]. Grew up poor on food stamps during childhood. Did well academically until high school, not many friends until high school. Oldest of 3 brothers. Didn't finish college d/t COVID and related job loss. Dual majored in informatics and math, minors in bio and computer science. 1 semester away from graduation. Lived on own since getting job.     PAST PSYCHIATRIC HISTORY     SIB- None  Suicide Attempt [#, most recent]- None  Suicidal Ideation Hx- yes, near overdose on APAP in 2018     Violence/Aggression Hx- None  Psychosis Hx- yes, during marline, auditory hallucinations  Eating Disorder Hx- yes, restricting, binging in college. Better now.   Other-  "None     Psych Hosp [#, most recent]- 1, in 2018 for maria m in the context of insomnia and substance use (LSD and ketamine)  Commitment- None  ECT- None  Outpatient Programs - None  Other - N/A     PAST MED TRIALS     Medication Max Dose (mg) Dates / Duration Helpful? DC Reason / Adverse Effects?   lamotrigine       Lack of efficacy   lithium 900 current Y [place] \"on edge\" at higher doses (900), severe lethargy and memory impairment at doses above 600 mg. Despite side effects works better than other mood stabilizer trials.   bupropion       Maria M, while on lithium   escitalopram       Maria M, while on lithium, OCD   atomoxetine     N Hot flashes, nausea. 1 month trial   Adderall    4901-6268 Y \"I hated it\" moderate efficacy   lisdexamfetamine     N Lack of efficacy   dexadrine       Felt like a zombie, dissociated    lurasidone       Somnolence, cognitive slowing   methylphenidate 54 current Y Wears off quick, emotional blunting and low libido.    venlafaxine  150  7538-3304 Y  initially helpful, discontinued due to affect of flattening and emotional blunting.   cariprazine  1.5  2021 to current Y     risperidone       Cognitive slowing   aripirazole       Tremors, cognitive slowing   ziprazidone       Cognitive slowing   olanzapine       Cognitive slowing   cariprazine 1.5 2021-current Y Weight gain         PAST SUBSTANCE USE HISTORY     Past Use-  Numerous drugs in college, former cannabis use. LSD, ketamine, cannabis, DMT, psylocybin, research chemicals. No opioids or benzodiazepines  Treatment- #, most recent- no  Medical Consequences- no  Legal Consequences- no  Other- N/A     MEDICAL HISTORY and ALLERGY     ALLERGIES: Patient has no known allergies.    There is no problem list on file for this patient.       MEDICAL REVIEW OF SYSTEMS     Contraception- Vasectomy,  Pregnant- N/A    A comprehensive review of systems was performed and is negative other than noted in the HPI.     MEDICATIONS     Current Outpatient " "Medications   Medication Sig Dispense Refill     guanFACINE (INTUNIV) 1 MG TB24 24 hr tablet Take 1 tablet (1 mg) by mouth At Bedtime 30 tablet 1     lithium ER (LITHOBID) 300 MG CR tablet Take 1 tablet (300 mg) by mouth 2 times daily 60 tablet 1     methylphenidate (CONCERTA) 36 MG CR tablet Take 1 tablet (36 mg) by mouth every morning 30 tablet 0     sertraline (ZOLOFT) 50 MG tablet Take 1 tablet (50 mg) by mouth daily 30 tablet 1      VITALS   BP (!) 130/90 (BP Location: Left arm, Patient Position: Sitting, Cuff Size: Adult Regular)   Pulse 74   Wt 109 kg (240 lb 6.4 oz)   BMI 32.60 kg/m       Pulse Readings from Last 5 Encounters:   12/08/22 74   11/14/22 115   11/08/22 79   10/11/22 114   10/07/22 71     Wt Readings from Last 5 Encounters:   12/08/22 109 kg (240 lb 6.4 oz)   11/14/22 108 kg (238 lb)   11/08/22 105.5 kg (232 lb 9.6 oz)   10/11/22 107.5 kg (237 lb)   10/07/22 106.6 kg (235 lb)     BP Readings from Last 5 Encounters:   12/08/22 (!) 130/90   11/14/22 127/76   11/08/22 (!) 137/94   10/11/22 (!) 146/94   10/07/22 139/87        MENTAL STATUS EXAM     Alertness: alert  and oriented  Appearance: adequately groomed  Behavior/Demeanor: cooperative, pleasant and calm, with good  eye contact   Speech: normal and regular rate and rhythm  Language: intact and no problems  Psychomotor: normal or unremarkable  Mood: \"good\"  Affect: full range; congruent to: mood- yes, content- yes  Thought Process/Associations: unremarkable  Thought Content:  Reports none;  Denies suicidal & violent ideation and delusions  Perception:  Reports none;  Denies none  Insight: good  Judgment: good  Cognition: does  appear grossly intact; formal cognitive testing was not done  Gait and Station: unremarkable     LABS and DATA     PHQ 5/14/2021 6/18/2021 1/3/2022   PHQ-9 Total Score 21 15 6   Q9: Thoughts of better off dead/self-harm past 2 weeks Nearly every day More than half the days Not at all   F/U: Thoughts of suicide or " self-harm Yes Yes -   F/U: Self harm-plan No No -   F/U: Self-harm action No No -   F/U: Safety concerns No No -     9/6/2022:    PHQ-9 total score is 9 (Q9: Not at all).   10/11/2022: PHQ-9 total score is 7 (Q9: Not at all)  11/7/2022:   PHQ-9 total score is 6 (Q9: Not at all)  12/8/2022:   PHQ-9 total score of 6 (Q9: Not at all)    Recent Labs   Lab Test 11/08/22  0844 06/02/21  0827   GLC 96 91     No lab results found.  Recent Labs   Lab Test 11/08/22  0844 06/02/21  0827   AST 23 28   ALT 54 49   ALKPHOS 128 90       Recent Labs   Lab Test 12/08/22  0845 05/24/22  1159 06/02/21  0827   LITHIUM 0.4 0.5 0.42*     Recent Labs   Lab Test 11/08/22  0844 06/02/21  0827   CR 1.25 1.14   GFRESTIMATED 82 90    140   POTASSIUM 5.2 4.1   JODI 10.0 9.2     Recent Labs   Lab Test 11/08/22  0844 06/02/21  0827   SG 1.020 1.025     Recent Labs   Lab Test 11/08/22  0844 06/02/21  0827   TSH 1.81 0.98     Recent Labs   Lab Test 11/08/22  0844 05/24/22  1159 06/02/21  0827   WBC 6.9 7.3 5.6   ANEU  --   --  3.2       Component      Latest Ref Rng & Units 6/2/2021 5/24/2022 12/8/2022   Lithium Level      mmol/L 0.42 (L) 0.5 0.4     Comment: Therapeutic: 0.60 - 1.20 mmol/L;   Potentially toxic: >1.50 mmol/L;   Severe toxicity: >2.50 mmol/L     No ECG in the chart.      PSYCHOTROPIC DRUG INTERACTIONS                                                       PSYCHCLINICDDI     Additive for serotonin syndrome risk: lithium + sertraline    Guanfacine + methylphenidate may affect heart rhythm      MANAGEMENT:  Monitoring for adverse effects, routine vitals and routine labs     RISK STATEMENT for SAFETY     Will did not appear to be an imminent safety risk to self or others.    TREATMENT RISK STATEMENT: The risks, benefits, alternatives and potential adverse effects have been discussed and are understood by the pt. The pt understands the risks of using street drugs or alcohol. There are no medical contraindications, the pt agrees to  treatment with the ability to do so. The pt knows to call the clinic for any problems or to access emergency care if needed.  Medical and substance use concerns are documented above.  Psychotropic drug interaction check was done, including changes made today.     PROVIDER: Anastasia Perrin MD, PhD    Patient staffed in clinic with Dr. Ward who will sign the note.  Supervisor is Dr. Lozano.

## 2022-12-08 ENCOUNTER — OFFICE VISIT (OUTPATIENT)
Dept: PSYCHIATRY | Facility: CLINIC | Age: 27
End: 2022-12-08
Attending: PSYCHIATRY & NEUROLOGY
Payer: COMMERCIAL

## 2022-12-08 ENCOUNTER — LAB (OUTPATIENT)
Dept: LAB | Facility: CLINIC | Age: 27
End: 2022-12-08
Attending: PSYCHIATRY & NEUROLOGY
Payer: COMMERCIAL

## 2022-12-08 VITALS
DIASTOLIC BLOOD PRESSURE: 90 MMHG | SYSTOLIC BLOOD PRESSURE: 130 MMHG | BODY MASS INDEX: 32.6 KG/M2 | WEIGHT: 240.4 LBS | HEART RATE: 74 BPM

## 2022-12-08 DIAGNOSIS — F90.0 ATTENTION DEFICIT HYPERACTIVITY DISORDER (ADHD), PREDOMINANTLY INATTENTIVE TYPE: ICD-10-CM

## 2022-12-08 DIAGNOSIS — F31.70 BIPOLAR I DISORDER IN REMISSION (H): Primary | ICD-10-CM

## 2022-12-08 DIAGNOSIS — F31.70 BIPOLAR I DISORDER IN REMISSION (H): ICD-10-CM

## 2022-12-08 LAB — LITHIUM SERPL-SCNC: 0.4 MMOL/L

## 2022-12-08 PROCEDURE — 99212 OFFICE O/P EST SF 10 MIN: CPT | Performed by: STUDENT IN AN ORGANIZED HEALTH CARE EDUCATION/TRAINING PROGRAM

## 2022-12-08 PROCEDURE — G0463 HOSPITAL OUTPT CLINIC VISIT: HCPCS

## 2022-12-08 PROCEDURE — 80178 ASSAY OF LITHIUM: CPT

## 2022-12-08 PROCEDURE — 36415 COLL VENOUS BLD VENIPUNCTURE: CPT

## 2022-12-08 PROCEDURE — 99214 OFFICE O/P EST MOD 30 MIN: CPT | Mod: GC | Performed by: STUDENT IN AN ORGANIZED HEALTH CARE EDUCATION/TRAINING PROGRAM

## 2022-12-08 RX ORDER — METHYLPHENIDATE HYDROCHLORIDE 36 MG/1
36 TABLET ORAL EVERY MORNING
Qty: 30 TABLET | Refills: 0 | Status: CANCELLED | OUTPATIENT
Start: 2022-12-08

## 2022-12-08 ASSESSMENT — PAIN SCALES - GENERAL: PAINLEVEL: NO PAIN (0)

## 2022-12-10 RX ORDER — LITHIUM CARBONATE 300 MG/1
300 TABLET, FILM COATED, EXTENDED RELEASE ORAL 2 TIMES DAILY
Qty: 60 TABLET | Refills: 1 | Status: SHIPPED | OUTPATIENT
Start: 2022-12-10 | End: 2023-01-12

## 2022-12-10 RX ORDER — GUANFACINE 1 MG/1
1 TABLET, EXTENDED RELEASE ORAL AT BEDTIME
Qty: 30 TABLET | Refills: 1 | Status: SHIPPED | OUTPATIENT
Start: 2022-12-10 | End: 2023-01-12

## 2022-12-19 ENCOUNTER — APPOINTMENT (OUTPATIENT)
Dept: SLEEP MEDICINE | Facility: CLINIC | Age: 27
End: 2022-12-19
Payer: COMMERCIAL

## 2022-12-19 ENCOUNTER — DOCUMENTATION ONLY (OUTPATIENT)
Dept: SLEEP MEDICINE | Facility: CLINIC | Age: 27
End: 2022-12-19

## 2022-12-19 DIAGNOSIS — G47.33 OSA (OBSTRUCTIVE SLEEP APNEA): Primary | ICD-10-CM

## 2022-12-19 NOTE — PROGRESS NOTES
Patient was offered choice of vendor and chose [facility].  Patient Lux Bailey was set up at [facility]  on December 19, 2022. Patient received a Resmed Airsense 11 Pressures were set at 5-15 cm H2O.   Patient s ramp is 5 cm H2O for Auto and FLEX/EPR is EPR, 2.  Patient received a [employer] Mask name: Dreamwear pillow mask size standard, heated tubing and heated humidifier.  Patient does not need to meet compliance. Patient declined to schedule sleep follow up at this time (he is moving in March and will need to establish care with local sleep provider.)  Qiana Morillo

## 2022-12-22 ENCOUNTER — DOCUMENTATION ONLY (OUTPATIENT)
Dept: SLEEP MEDICINE | Facility: CLINIC | Age: 27
End: 2022-12-22
Payer: COMMERCIAL

## 2022-12-22 NOTE — PROGRESS NOTES
3 day Sleep therapy management telephone visit    Diagnostic AHI:  10.9 events per hour watch PAT    Confirmed with patient at time of call- N/A Patient is still interested in STM service       Message left for patient to return call    Order settings:  CPAP MIN CPAP MAX   5 cm H2O 15 cm H2O       Device settings:  CPAP MIN CPAP MAX EPR RESMED SOFT RESPONSE SETTING   5.0 cm  H20 15.0 cm  H20 TWO OFF       Compliance 50 %    Assessment: Nighty usage most nights over four hours      Action plan: Patient to have 14 day STM visit. Patient has a follow up visit scheduled:   no and not required by insurance    Replacement device: No  STM ordered by provider: Yes     Total time spent on accessing and  interpreting remote patient PAP therapy data  10 minutes    Total time spent counseling, coaching  and reviewing PAP therapy data with patient  1 minutes    [id number] no

## 2023-01-04 ENCOUNTER — DOCUMENTATION ONLY (OUTPATIENT)
Dept: SLEEP MEDICINE | Facility: CLINIC | Age: 28
End: 2023-01-04
Payer: COMMERCIAL

## 2023-01-04 NOTE — PROGRESS NOTES
14  DAY STM VISIT    Diagnostic AHI:  10.9 events per hour watch PAT    Subjective measures: Patient reports things are going well with CPAP and denies any questions or concerns. Patient was given my contact information to call if they have any questions.      Assessment: Pt not meeting objective benchmarks for compliance  Patient meeting subjective benchmarks.     Action plan: pt to have 30 day STM visit.      Device type: Auto-CPAP    PAP settings:  DEVICE TYPE CPAP MIN CPAP MAX 95TH % PRESSURE EPR MASK DISPENSED   Auto-CPAP    5.0 cm  H20 15.0 cm  H20 10.5 cm  H20  TWO Per patient choice     Mask type:  Per patient choice    Objective measures: 14 day rolling measures   COMPLIANCE LEAK AHI AVERAGE USE IN MINUTES   57 % 0.46 3.81 193   GOAL >70% GOAL < 24 LPM GOAL <5 GOAL >240          Total time spent on accessing and interpreting remote patient PAP therapy data  10 minutes    Total time spent counseling, coaching  and reviewing PAP therapy data with patient  2 minutes    [id number]  [id number]  no (3 day STM)

## 2023-01-12 ENCOUNTER — OFFICE VISIT (OUTPATIENT)
Dept: PSYCHIATRY | Facility: CLINIC | Age: 28
End: 2023-01-12
Attending: PSYCHIATRY & NEUROLOGY
Payer: COMMERCIAL

## 2023-01-12 VITALS
DIASTOLIC BLOOD PRESSURE: 89 MMHG | HEART RATE: 96 BPM | BODY MASS INDEX: 32.92 KG/M2 | SYSTOLIC BLOOD PRESSURE: 134 MMHG | WEIGHT: 242.8 LBS

## 2023-01-12 DIAGNOSIS — G47.33 OSA (OBSTRUCTIVE SLEEP APNEA): ICD-10-CM

## 2023-01-12 DIAGNOSIS — F90.0 ATTENTION DEFICIT HYPERACTIVITY DISORDER (ADHD), PREDOMINANTLY INATTENTIVE TYPE: ICD-10-CM

## 2023-01-12 DIAGNOSIS — F31.70 BIPOLAR I DISORDER IN REMISSION (H): Primary | ICD-10-CM

## 2023-01-12 PROCEDURE — G0463 HOSPITAL OUTPT CLINIC VISIT: HCPCS | Performed by: STUDENT IN AN ORGANIZED HEALTH CARE EDUCATION/TRAINING PROGRAM

## 2023-01-12 PROCEDURE — 99214 OFFICE O/P EST MOD 30 MIN: CPT | Mod: GC | Performed by: STUDENT IN AN ORGANIZED HEALTH CARE EDUCATION/TRAINING PROGRAM

## 2023-01-12 RX ORDER — GUANFACINE 1 MG/1
1 TABLET, EXTENDED RELEASE ORAL AT BEDTIME
Qty: 30 TABLET | Refills: 1 | Status: CANCELLED | OUTPATIENT
Start: 2023-01-12

## 2023-01-12 RX ORDER — LITHIUM CARBONATE 300 MG/1
300 TABLET, FILM COATED, EXTENDED RELEASE ORAL 2 TIMES DAILY
Qty: 60 TABLET | Refills: 1 | Status: SHIPPED | OUTPATIENT
Start: 2023-01-12 | End: 2023-02-02

## 2023-01-12 RX ORDER — LISDEXAMFETAMINE DIMESYLATE 30 MG/1
30 CAPSULE ORAL EVERY MORNING
Qty: 30 CAPSULE | Refills: 0 | Status: SHIPPED | OUTPATIENT
Start: 2023-01-12 | End: 2023-02-02

## 2023-01-12 RX ORDER — GUANFACINE 1 MG/1
1 TABLET, EXTENDED RELEASE ORAL AT BEDTIME
Qty: 30 TABLET | Refills: 1 | Status: SHIPPED | OUTPATIENT
Start: 2023-01-12 | End: 2023-02-02

## 2023-01-12 ASSESSMENT — PAIN SCALES - GENERAL: PAINLEVEL: NO PAIN (0)

## 2023-01-12 NOTE — PROGRESS NOTES
"     [facility]  Psychiatry Clinic  MEDICAL PROGRESS NOTE     CARE TEAM:  PCP- Physician No Ref-Primary, Psychotherapist- None     Lux Bailey is a 27 year old who prefers the name Regino and uses pronouns he, him, himself    This patient has been seen by previous providers in the [facility]. Sections of the history below have been copied from previous diagnostic assessments and were independently confirmed and updated as needed during this appointment.     DIAGNOSIS     Bipolar disorder I, severe, most recent episode manic, with psychotic features, in full remission  ADHD, inattentive type   Obsessive compulsive symptoms, r/o OCD    Obstructive sleep apnea, on CPAP     ASSESSMENT     Will reports that his mood has been stable since the last visit. Denies any marline or psychosis symptoms. No significant change in his ADHD symptoms. He has not been taking methylphenidate (Concerta) 36 mg daily; he has been using his previous supplies of Ritalin 5 mg. Will brought up again his wish to try Vyvanse, and he believes that his new insurance would cover it although it is noted in his chart that \"Vyvanse didn't work\" previously. He recalls that he was on 30 mg and clarified that he had to discontinue Vyvanse due to issues with his insurance coverage. BP is normal today. He has been using CPAP regularly and feels it has helped his elevated BP.     Moving to [facility] in early March and looking for a new provider there. He is aware that our clinic can't manage his meds after he moves to another state.     His mood has been stable despite subtherapeutic lithium levels (see labs below). No manic episodes since 2018. He is not interested in increasing his lithium dose. Of note, Regino was at higher doses of lithium in 2021 - 750 mg daily, which resulted in a significant increase in lethargy. At 900 mg daily, the patient experienced worsening lethargy, difficulty getting out of bed, " and considerable impairment in memory and concentration. His dose was reduced to 600 mg. He self-discontinued 600 mg daily dose on 10/2021, then agreed to resume at 300 mg BID on 11/2021.    Denies SI, plan, or intent. No safety concerns    We discussed the potential side effects of stimulants again. Patient understands that concurrent use of stimulant and antidepressant medications places him at elevated risk for marline. He will monitor his symptoms and understand that if a reduced need for sleep, insomnia, racing thoughts, or significant changes in his mood occur, he will call the clinic or go to an emergency room. He denied suicidal ideation, plan or intent. No safety concerns today.    Per Dr. Petty's note on 6/17/2022:  Future considerations: History of stable mood despite subtherapeutic lithium levels. Consider increasing lithium given concurrent stimulant prescriptions. Consider trazodone or mirtazapine as alternative antidepressant medication.  Consider initiation of PDE 5 inhibitor if erectile dysfunction is a concern again. Consider VPA and re-evaluate other SGA trials.      [place] was checked today:  Indicates taking controlled medication as prescribed.     PLAN                                                                                                                1) Meds-   - Discontinue methylphenidate (CONCERTA) 36 mg daily  - Start Vyvanse 30 mg daily  - Continue lithium ER (LITHOBID) 300 mg BID  - Continue sertraline (ZOLOFT) 50 mg daily  - Continue guanfacine (INTUNIV) 1 MG TB24 24 hr at bedtime     Non prescribed medications:  - Magnesium supplement and daily multivitamin qhs     2) Psychotherapy -  Interested in therapy for ADHD and hx of eating disorder. Referral was made previously. .     3) Next due-  Labs- due Nov 2023  EKG- as needed  Rating scales-PHQ 9 as needed     4) Referrals- None     5) Dispo- 3-4 weeks     PERTINENT BACKGROUND                                [most recent eval  "07/24/22]     Previous diagnoses include depression, schizophrenia in 2016 when starting college, and ADHD in 2017.  He was initially diagnosed with schizophrenia and placed on antipsychotics for two years. In 2018 diagnosis changed from schizophrenia to bipolar disorder, and he was put on lamotrigine. Lamotrigine was ineffective, and he was transitioned to lithium at the end of 2018.  Mood has been stable, with no manic episodes since starting lithium.  Mood symptoms have been well managed at subtherapeutic lithium levels.  He recalls he grew up in a family where \"mental illness wasn't real\" but struggled with depression starting in 1st or 2nd grade, \"regularly had teachers pull me aside and ask if everything was OK at home.\" He reports a history of chronic suicidal ideation since 3rd grade, particularly when depressed.  He has never \"actually attempted it.\" He has also had intrusive thoughts \"all the time\" about hurting or killing himself, despite not wanting to self-harm or do it.  Episodes began to get worse and worse, and he \"played the 'things are going to get better when I get to the next stage of life' game.\" He first started medications at the beginning of college, when he had his first manic episode, likely triggered by many factors, including lack of sleep and stress.  He has not had a manic episode since he started lithium in 2018/2019.  In college, he [facility].  His psychiatrist advised him to immediately go off all his mood stabilizers, which made him concerned, and he wanted a second opinion.  After transferring care to [facility], he subsequently self-discontinued venlafaxine due to affective flattening.     Psych pertinent item history includes suicidal ideation, multiple psychotropic trials , psych hosp (x1, but several psych ER visits) and substance use: alcohol, cannabis and hallucinogens     SUBJECTIVE     - Will reports that he has been doing well overall.   - Mood has " been stable. No new symptoms.   - Denies psychosis, marline, or depressive symptoms.  - ADHD symptoms return when he skips his ADHD medication; currently, he has been taking Ritalin 5 mg from his previous supply and did not refill Concerta    - BP is normal today.   - States he has been using his CPAP now, and sleep has improved  - Denies SI, plan, or intent. No safety concerns today    Recent Social History: see below    Recent Psych Symptoms:   Depression:  low mood, feels from stress of work   Elevated:  none  Psychosis:  none  Anxiety:  excessive worry, attributes to ADHD concerns   Trauma Related:  none  Sleep: no and denies significant sleep issues, CARLOS  Other: no     Adverse Effects: none   Pertinent Negative Symptoms: No violent ideation, psychosis, hallucinations or marline, hypomania    Recent Substance Use:     Alcohol- none  Tobacco- none  Caffeine- 1 cup of coffee per day   Cannabis- none     Opioids- none           Narcan Kit- n/a   Other illicit drugs- none, no use in 2 years    Pertinent Negative Symptoms: No violent ideation, psychosis, hallucinations or marline, hypomania  Adverse Effects: none    PSYCH and SUBSTANCE USE Critical Summary Points since July 2021 6/18/21: Stopped effexor on own  7/26/21: Lithium increased to 750 mg for 2 weeks then to 900 mg if well-tolerated.  8/23/21: Lithium return to 600 mg daily.  Cariprazine 1.5 mg initiated  10/4/21: Concerta increased to 54 mg daily. Lithium self-discontinued  11/29/21: Lithium resumed at 300 mg qam for 14 days, then increase to 300 BID. Concerta decreased to 36 mg daily.  1/3/22: No changes  3/4/22: Start sertraline 50 mg daily  4/1/22: No medication changes, sleep medicine referral placed  5/6/22: Start methylphenidate 5 mg daily at 2 PM  6/17/22: No changes    7/25/2022: No medication change   9/6/2022: No medication changes  10/11/2022: - Started guanfacine (INTUNIV) 1 MG TB24 24 hr at bedtime, discontinued methylphenidate 5 mg daily at 2  PM (patient not taking it)   11/7/2022: No medication changes.   12/8/2022: No medication changes  1/12/2023: Discontinued methylphenidate (CONCERTA) 36 mg daily and started Vyvanse 30 mg daily     FAMILY and SOCIAL HISTORY                                 pt reported     Family Hx:   Father, paternal grandfather with alcoholism.  Maternal grandfather and mother have bipolar disorder.   Middle brother with ADHD and autism  Parkinson's and Alzheimer's on both sides of family. Nothing diagnosed in the immediate family.      Social Hx:  Financial/ Work- Working,  for [employer]  Partner/ - girlfriend  Children- None      Living situation- lives in [place] in apartment alone.   Social/ Spiritual Support- friends, girlfriend, family      Feels Safe at Home- yes   Legal- None     Trauma History (self-report)- None    Early History/Education- Born in [place], [place]. Moved often in childhood in [place], [place], [place], [place], [place]. Grew up poor on food stamps during childhood. Did well academically until high school, not many friends until high school. Oldest of 3 brothers. Didn't finish college d/t COVID and related job loss. Dual majored in informatics and math, minors in bio and computer science. 1 semester away from graduation. Lived on own since getting job.     PAST PSYCHIATRIC HISTORY     SIB- None  Suicide Attempt [#, most recent]- None  Suicidal Ideation Hx- yes, near overdose on APAP in 2018     Violence/Aggression Hx- None  Psychosis Hx- yes, during marline, auditory hallucinations  Eating Disorder Hx- yes, restricting, binging in college. Better now.   Other- None     Psych Hosp [#, most recent]- 1, in 2018 for marline in the context of insomnia and substance use (LSD and ketamine)  Commitment- None  ECT- None  Outpatient Programs - None  Other - N/A     PAST MED TRIALS     Medication Max Dose (mg) Dates / Duration Helpful? DC Reason / Adverse Effects?   lamotrigine       Lack of efficacy   lithium 900 current Y Felt  "\"on edge\" at higher doses (900), severe lethargy and memory impairment at doses above 600 mg. Despite side effects works better than other mood stabilizer trials.   bupropion       Maria M, while on lithium   escitalopram       Maria M, while on lithium, OCD   atomoxetine     N Hot flashes, nausea. 1 month trial   Adderall    8427-4829 Y \"I hated it\" moderate efficacy   lisdexamfetamine     N Lack of efficacy?   dexadrine       Felt like a zombie, dissociated    lurasidone       Somnolence, cognitive slowing   methylphenidate 54 current Y Wears off quick, emotional blunting and low libido.    venlafaxine  150  6230-7066 Y  initially helpful, discontinued due to affect of flattening and emotional blunting.   cariprazine  1.5  2021 to current Y     risperidone       Cognitive slowing   aripirazole       Tremors, cognitive slowing   ziprazidone       Cognitive slowing   olanzapine       Cognitive slowing   cariprazine 1.5 2021-current Y Weight gain         PAST SUBSTANCE USE HISTORY     Past Use-  Numerous drugs in college, former cannabis use. LSD, ketamine, cannabis, DMT, psylocybin, research chemicals. No opioids or benzodiazepines  Treatment- #, most recent- no  Medical Consequences- no  Legal Consequences- no  Other- N/A     MEDICAL HISTORY and ALLERGY     ALLERGIES: Patient has no known allergies.    There is no problem list on file for this patient.       MEDICAL REVIEW OF SYSTEMS     Contraception- Vasectomy,  Pregnant- N/A    A comprehensive review of systems was performed and is negative other than noted in the HPI.     MEDICATIONS     Current Outpatient Medications   Medication Sig Dispense Refill     guanFACINE (INTUNIV) 1 MG TB24 24 hr tablet Take 1 tablet (1 mg) by mouth At Bedtime 30 tablet 1     lisdexamfetamine (VYVANSE) 30 MG capsule Take 1 capsule (30 mg) by mouth every morning 30 capsule 0     lithium ER (LITHOBID) 300 MG CR tablet Take 1 tablet (300 mg) by mouth 2 times daily 60 tablet 1     sertraline " "(ZOLOFT) 50 MG tablet Take 1 tablet (50 mg) by mouth daily 30 tablet 1      VITALS   /89 (BP Location: Left arm, Patient Position: Sitting, Cuff Size: Adult Large)   Pulse 96   Wt 110.1 kg (242 lb 12.8 oz)   BMI 32.92 kg/m       Pulse Readings from Last 5 Encounters:   01/12/23 96   12/08/22 74   11/14/22 115   11/08/22 79   10/11/22 114     Wt Readings from Last 5 Encounters:   01/12/23 110.1 kg (242 lb 12.8 oz)   12/08/22 109 kg (240 lb 6.4 oz)   11/14/22 108 kg (238 lb)   11/08/22 105.5 kg (232 lb 9.6 oz)   10/11/22 107.5 kg (237 lb)     BP Readings from Last 5 Encounters:   01/12/23 134/89   12/08/22 (!) 130/90   11/14/22 127/76   11/08/22 (!) 137/94   10/11/22 (!) 146/94        MENTAL STATUS EXAM     Alertness: alert  and oriented  Appearance: adequately groomed  Behavior/Demeanor: cooperative, pleasant and calm, with good  eye contact   Speech: normal and regular rate and rhythm  Language: intact and no problems  Psychomotor: normal or unremarkable  Mood: \"good\"  Affect: full range; congruent to: mood- yes, content- yes  Thought Process/Associations: unremarkable  Thought Content:  Reports none;  Denies suicidal & violent ideation and delusions  Perception:  Reports none;  Denies none  Insight: good  Judgment: good  Cognition: does  appear grossly intact; formal cognitive testing was not done  Gait and Station: unremarkable     LABS and DATA     PHQ 5/14/2021 6/18/2021 1/3/2022   PHQ-9 Total Score 21 15 6   Q9: Thoughts of better off dead/self-harm past 2 weeks Nearly every day More than half the days Not at all   F/U: Thoughts of suicide or self-harm Yes Yes -   F/U: Self harm-plan No No -   F/U: Self-harm action No No -   F/U: Safety concerns No No -     9/6/2022:    PHQ-9 total score is 9 (Q9: Not at all).   10/11/2022: PHQ-9 total score is 7 (Q9: Not at all)  11/7/2022:   PHQ-9 total score is 6 (Q9: Not at all)  12/8/2022:   PHQ-9 total score of 6 (Q9: Not at all)    Recent Labs   Lab Test " 11/08/22  0844 06/02/21  0827   GLC 96 91     No lab results found.  Recent Labs   Lab Test 11/08/22  0844 06/02/21  0827   AST 23 28   ALT 54 49   ALKPHOS 128 90       Recent Labs   Lab Test 12/08/22  0845 05/24/22  1159 06/02/21  0827   LITHIUM 0.4 0.5 0.42*     Recent Labs   Lab Test 11/08/22 0844 06/02/21 0827   CR 1.25 1.14   GFRESTIMATED 82 90    140   POTASSIUM 5.2 4.1   JODI 10.0 9.2     Recent Labs   Lab Test 11/08/22 0844 06/02/21  0827   SG 1.020 1.025     Recent Labs   Lab Test 11/08/22 0844 06/02/21 0827   TSH 1.81 0.98     Recent Labs   Lab Test 11/08/22 0844 05/24/22  1159 06/02/21  0827   WBC 6.9 7.3 5.6   ANEU  --   --  3.2       Component      Latest Ref Rng & Units 6/2/2021 5/24/2022 12/8/2022   Lithium Level      Mmol/L     0.42 (L) 0.5 0.4     Comment: Therapeutic: 0.60 - 1.20 mmol/L;   Potentially toxic: >1.50 mmol/L;   Severe toxicity: >2.50 mmol/L     No ECG in the chart.      PSYCHOTROPIC DRUG INTERACTIONS                                                       PSYCHCLINICDDI     Additive for serotonin syndrome risk: lithium + sertraline    Guanfacine + Vyvanse may affect heart rhythm      MANAGEMENT:  Monitoring for adverse effects, routine vitals and routine labs     RISK STATEMENT for SAFETY     Will did not appear to be an imminent safety risk to self or others.    TREATMENT RISK STATEMENT: The risks, benefits, alternatives and potential adverse effects have been discussed and are understood by the pt. The pt understands the risks of using street drugs or alcohol. There are no medical contraindications, the pt agrees to treatment with the ability to do so. The pt knows to call the clinic for any problems or to access emergency care if needed.  Medical and substance use concerns are documented above.  Psychotropic drug interaction check was done, including changes made today.     PROVIDER: Anastasia Perrin MD, PhD    Patient staffed in clinic with Dr. Ward who will sign the note.   Supervisor is Dr. Lozano.

## 2023-01-18 ENCOUNTER — DOCUMENTATION ONLY (OUTPATIENT)
Dept: SLEEP MEDICINE | Facility: CLINIC | Age: 28
End: 2023-01-18
Payer: COMMERCIAL

## 2023-01-18 NOTE — PROGRESS NOTES
30 DAY STM VISIT    Diagnostic AHI:  10.9 events per hour watch PAT    PAP settings:  CPAP MIN CPAP MAX 95TH % PRESSURE EPR RESMED SOFT RESPONSE SETTING   5.0 cm  H20 15.0 cm  H20 10.6 cm  H20  TWO OFF     Device type: Auto-CPAP  Mask type:  Per patient choice    Objective measures: 14 day rolling measures:    COMPLIANCE LEAK AHI AVERAGE USE IN MINUTES   21 % 0.6 4.82 97   GOAL >70% GOAL < 24 LPM GOAL <5 GOAL >240        Assessment: Pt not meeting objective benchmarks for compliance  Patient meeting subjective benchmarks.   Subjective measures: Patient reports things are going well with CPAP. He feels much less lethargic. He has missed some nights due to traveling and not wanting to pack it but he feels much worse the next day when he does not use it so he wants to continue using it.   Action plan: pt to have 6 month [facility] visit  Patient has not scheduled a follow up visit.     Total time spent on accessing and interpreting remote patient PAP therapy data  10 minutes    Total time spent counseling, coaching  and reviewing PAP therapy data with patient  3 minutes     [id number] this call  [id number] no  at 3 or 14 day [facility]

## 2023-02-02 ENCOUNTER — OFFICE VISIT (OUTPATIENT)
Dept: PSYCHIATRY | Facility: CLINIC | Age: 28
End: 2023-02-02
Attending: PSYCHIATRY & NEUROLOGY
Payer: COMMERCIAL

## 2023-02-02 VITALS
HEART RATE: 97 BPM | SYSTOLIC BLOOD PRESSURE: 135 MMHG | WEIGHT: 238.8 LBS | BODY MASS INDEX: 32.38 KG/M2 | DIASTOLIC BLOOD PRESSURE: 91 MMHG

## 2023-02-02 DIAGNOSIS — G47.33 OSA (OBSTRUCTIVE SLEEP APNEA): ICD-10-CM

## 2023-02-02 DIAGNOSIS — F31.70 BIPOLAR I DISORDER IN REMISSION (H): ICD-10-CM

## 2023-02-02 DIAGNOSIS — F90.0 ATTENTION DEFICIT HYPERACTIVITY DISORDER (ADHD), PREDOMINANTLY INATTENTIVE TYPE: ICD-10-CM

## 2023-02-02 DIAGNOSIS — F31.70 BIPOLAR I DISORDER IN REMISSION (H): Primary | ICD-10-CM

## 2023-02-02 PROCEDURE — G0463 HOSPITAL OUTPT CLINIC VISIT: HCPCS | Performed by: STUDENT IN AN ORGANIZED HEALTH CARE EDUCATION/TRAINING PROGRAM

## 2023-02-02 PROCEDURE — 99214 OFFICE O/P EST MOD 30 MIN: CPT | Mod: GC | Performed by: STUDENT IN AN ORGANIZED HEALTH CARE EDUCATION/TRAINING PROGRAM

## 2023-02-02 RX ORDER — GUANFACINE 1 MG/1
1 TABLET, EXTENDED RELEASE ORAL AT BEDTIME
Qty: 30 TABLET | Refills: 1 | Status: SHIPPED | OUTPATIENT
Start: 2023-02-02 | End: 2023-03-07

## 2023-02-02 RX ORDER — LITHIUM CARBONATE 300 MG/1
300 TABLET, FILM COATED, EXTENDED RELEASE ORAL 2 TIMES DAILY
Qty: 60 TABLET | Refills: 1 | Status: SHIPPED | OUTPATIENT
Start: 2023-02-02 | End: 2023-03-07

## 2023-02-02 RX ORDER — LISDEXAMFETAMINE DIMESYLATE 30 MG/1
30 CAPSULE ORAL EVERY MORNING
Qty: 30 CAPSULE | Refills: 0 | Status: SHIPPED | OUTPATIENT
Start: 2023-02-02 | End: 2023-03-08

## 2023-02-02 ASSESSMENT — PAIN SCALES - GENERAL: PAINLEVEL: NO PAIN (0)

## 2023-02-02 NOTE — PROGRESS NOTES
[facility]  Psychiatry Clinic  MEDICAL PROGRESS NOTE     CARE TEAM:  PCP- Physician No Ref-Primary, Psychotherapist- None     Lux Bailey is a 27 year old who prefers the name Regino and uses pronouns he, him, himself    This patient has been seen by previous providers in the [facility]. Sections of the history below have been copied from previous diagnostic assessments and were independently confirmed and updated as needed during this appointment.     DIAGNOSES     Bipolar disorder I, severe, most recent episode manic, with psychotic features, in full remission  ADHD, inattentive type     Other:  Obstructive sleep apnea, on CPAP     ASSESSMENT       Will reports that his mood has been stable since the last visit. Denies any marline or psychosis symptoms. No significant change in his ADHD symptoms. He states that switching to Vyvanse has been helpful and describes the effect of the medication as smooth and less intense. He requested support for the insurance coverage, and we'll look into this and provide prior authorization for it if needed.     He has been using CPAP regularly and feels it has helped his elevated BP. Will continue monitor his BP.     As planned, he is moving to [facility] at the end of March and looking for a new provider. He knows our clinic can't manage his meds after he moves to another state.     Regarding his current lithium dose, his mood has been stable despite subtherapeutic lithium levels (see labs below). No manic episodes since 2018. He is not interested in increasing his lithium dose. Of note, Regino was at higher doses of lithium in 2021 - 750 mg daily, which resulted in a significant increase in lethargy. At 900 mg daily, the patient experienced worsening lethargy, difficulty getting out of bed, and considerable impairment in memory and concentration. His dose was reduced to 600 mg. He self-discontinued 600 mg daily dose on 10/2021,  then agreed to resume at 300 mg BID on 11/2021.    Dicussed today the previously noted obsessive-compulsive symptoms, r/o OCD diagnosis. He stated that these symptoms occurred briefly in the past during a manic episode, and he denied any current symptoms. OCD was removed from the diagnosis list.    Denies SI, plan, or intent. No safety concerns    We discussed the potential side effects of stimulants again. Patient understands that concurrent use of stimulant and antidepressant medications places him at elevated risk for marline. He will monitor his symptoms and understand that if a reduced need for sleep, insomnia, racing thoughts, or significant changes in his mood occur, he will call the clinic or go to an emergency room. He denied suicidal ideation, plan or intent. No safety concerns today.    Per Dr. Petty's note on 6/17/2022:  Future considerations: History of stable mood despite subtherapeutic lithium levels. Consider increasing lithium given concurrent stimulant prescriptions. Consider trazodone or mirtazapine as alternative antidepressant medication. Consider initiation of PDE 5 inhibitor if erectile dysfunction is a concern again. Consider VPA and re-evaluate other SGA trials.      [place] was checked today:  Indicates taking controlled medication as prescribed.     PLAN                                                                                                                1) Meds-   - Continue lisdexamfetamine (VYVANSE) 30 mg daily  - Continue lithium ER (LITHOBID) 300 mg BID  - Continue sertraline (ZOLOFT) 50 mg daily  - Continue guanfacine (INTUNIV) 1 MG TB24 24 hr at bedtime     Non prescribed medications:  - Magnesium supplement and daily multivitamin qhs     2) Psychotherapy -  Interested in therapy for ADHD and hx of eating disorder. Referral was made previously. .     3) Next due-  Labs- due Nov 2023  EKG- not required for routine monitoring   Rating scales-PHQ 9 as needed     4) Referrals-  "None     5) Dispo- 4-6  weeks     PERTINENT BACKGROUND                                [most recent eval 07/24/22]     Previous diagnoses include depression, schizophrenia in 2016 when starting college, and ADHD in 2017.  He was initially diagnosed with schizophrenia and placed on antipsychotics for two years. In 2018 diagnosis changed from schizophrenia to bipolar disorder, and he was put on lamotrigine. Lamotrigine was ineffective, and he was transitioned to lithium at the end of 2018.  Mood has been stable, with no manic episodes since starting lithium.  Mood symptoms have been well managed at subtherapeutic lithium levels.  He recalls he grew up in a family where \"mental illness wasn't real\" but struggled with depression starting in 1st or 2nd grade, \"regularly had teachers pull me aside and ask if everything was OK at home.\" He reports a history of chronic suicidal ideation since 3rd grade, particularly when depressed.  He has never \"actually attempted it.\" He has also had intrusive thoughts \"all the time\" about hurting or killing himself, despite not wanting to self-harm or do it.  Episodes began to get worse and worse, and he \"played the 'things are going to get better when I get to the next stage of life' game.\" He first started medications at the beginning of college, when he had his first manic episode, likely triggered by many factors, including lack of sleep and stress.  He has not had a manic episode since he started lithium in 2018/2019.  In college, he got care through the university clinic.  His psychiatrist advised him to immediately go off all his mood stabilizers, which made him concerned, and he wanted a second opinion.  After transferring care to [facility], he subsequently self-discontinued venlafaxine due to affective flattening.     Psych pertinent item history includes suicidal ideation, multiple psychotropic trials , psych hosp (x1, but several psych ER visits) and substance use: alcohol, cannabis " "and hallucinogens     SUBJECTIVE      - Will reports that he has been doing well overall.   - Vyvanse has been helpful; it takes 2-3 hrs to kick in; \"it is mellow, not tense, slower to come on.\"   - Mood has been stable. No new symptoms.   - Denies psychosis, marline, or depressive symptoms.  - Anxious about the move to [facility] in March  - ADHD symptoms improved  -Will continue to monitor BP   - States he continues to use his CPAP, and sleep has improved  - Denies SI, plan, or intent. No safety concerns today    Recent Social History: see below    Recent Psych Symptoms:   Depression:  denies  Elevated:  none  Psychosis:  none  Anxiety:  excessive worry, anxious about the move to [facility], managable  Trauma Related:  none  Sleep: no and denies significant sleep issues, CARLOS on CPAP  Other: no     Adverse Effects: none   Pertinent Negative Symptoms: No violent ideation, psychosis, hallucinations or marline, hypomania    Recent Substance Use:     Alcohol- none  Tobacco- none  Caffeine- 1 cup of coffee per day   Cannabis- none     Opioids- none           Narcan Kit- n/a   Other illicit drugs- none, no use in 2 years    Pertinent Negative Symptoms: No violent ideation, psychosis, hallucinations or marline, hypomania  Adverse Effects: none    PSYCH and SUBSTANCE USE Critical Summary Points since July 2021 6/18/21: Stopped effexor on own  7/26/21: Lithium increased to 750 mg for 2 weeks then to 900 mg if well-tolerated.  8/23/21: Lithium return to 600 mg daily.  Cariprazine 1.5 mg initiated  10/4/21: Concerta increased to 54 mg daily. Lithium self-discontinued  11/29/21: Lithium resumed at 300 mg qam for 14 days, then increase to 300 BID. Concerta decreased to 36 mg daily.  1/3/22: No changes  3/4/22: Start sertraline 50 mg daily  4/1/22: No medication changes, sleep medicine referral placed  5/6/22: Start methylphenidate 5 mg daily at 2 PM  6/17/22: No changes    7/25/2022: No medication change "   9/6/2022: No medication changes  10/11/2022: Started guanfacine (INTUNIV) 1 MG TB24 24 hr at bedtime, discontinued methylphenidate 5 mg daily at 2 PM (patient not taking it)   11/7/2022: No medication changes.   12/8/2022: No medication changes  1/12/2023: Discontinued methylphenidate (CONCERTA) 36 mg daily and started Vyvanse 30 mg daily  2/2/2023: No medication changes.      FAMILY and SOCIAL HISTORY                                 pt reported     Family Hx:   Father, paternal grandfather with alcoholism.  Maternal grandfather and mother have bipolar disorder.   Middle brother with ADHD and autism  Parkinson's and Alzheimer's on both sides of family. Nothing diagnosed in the immediate family.      Social Hx:  Financial/ Work- Working,  for [employer]  Partner/ - girlfriend  Children- None      Living situation- lives in [place] in apartment alone.   Social/ Spiritual Support- friends, girlfriend, family      Feels Safe at Home- yes   Legal- None     Trauma History (self-report)- None    Early History/Education- Born in [place], [place]. Moved often in childhood in [place], [place], [place], [place], [place]. Grew up poor on food stamps during childhood. Did well academically until high school, not many friends until high school. Oldest of 3 brothers. Didn't finish college d/t COVID and related job loss. Dual majored in informatics and math, minors in bio and computer science. 1 semester away from graduation. Lived on own since getting job.     PAST PSYCHIATRIC HISTORY     SIB- None  Suicide Attempt [#, most recent]- None  Suicidal Ideation Hx- yes, near overdose on APAP in 2018     Violence/Aggression Hx- None  Psychosis Hx- yes, during marline, auditory hallucinations  Eating Disorder Hx- yes, restricting, binging in college. Better now.   Other- None     Psych Hosp [#, most recent]- 1, in 2018 for marline in the context of insomnia and substance use (LSD and ketamine)  Commitment- None  ECT- None  Outpatient Programs -  "None  Other - N/A     PAST MED TRIALS     Medication Max Dose (mg) Dates / Duration Helpful? DC Reason / Adverse Effects?   lamotrigine       Lack of efficacy   lithium 900 current Y [place] \"on edge\" at higher doses (900), severe lethargy and memory impairment at doses above 600 mg. Despite side effects works better than other mood stabilizer trials.   bupropion       Maria M, while on lithium   escitalopram       Maria M, while on lithium, OCD   atomoxetine     N Hot flashes, nausea. 1 month trial   Adderall    3240-4841 Y \"I hated it\" moderate efficacy   lisdexamfetamine     N Lack of efficacy?   dexadrine       Felt like a zombie, dissociated    lurasidone       Somnolence, cognitive slowing   methylphenidate 54 current Y Wears off quick, emotional blunting and low libido.    venlafaxine  150  [id number] Y  initially helpful, discontinued due to affect of flattening and emotional blunting.   cariprazine  1.5  2021 to current Y     risperidone       Cognitive slowing   aripirazole       Tremors, cognitive slowing   ziprazidone       Cognitive slowing   olanzapine       Cognitive slowing   cariprazine 1.5 2021-current Y Weight gain         PAST SUBSTANCE USE HISTORY     Past Use-  Numerous drugs in college, former cannabis use. LSD, ketamine, cannabis, DMT, psylocybin, research chemicals. No opioids or benzodiazepines  Treatment- #, most recent- no  Medical Consequences- no  Legal Consequences- no  Other- N/A     MEDICAL HISTORY and ALLERGY     ALLERGIES: Patient has no known allergies.    There is no problem list on file for this patient.       MEDICAL REVIEW OF SYSTEMS     Contraception- Vasectomy,  Pregnant- N/A    A comprehensive review of systems was performed and is negative other than noted in the HPI.     MEDICATIONS     Current Outpatient Medications   Medication Sig Dispense Refill     guanFACINE (INTUNIV) 1 MG TB24 24 hr tablet Take 1 tablet (1 mg) by mouth At Bedtime 30 tablet 1     lisdexamfetamine (VYVANSE) " "30 MG capsule Take 1 capsule (30 mg) by mouth every morning 30 capsule 0     lithium ER (LITHOBID) 300 MG CR tablet Take 1 tablet (300 mg) by mouth 2 times daily 60 tablet 1     sertraline (ZOLOFT) 50 MG tablet Take 1 tablet (50 mg) by mouth daily 30 tablet 1      VITALS   BP (!) 135/91 (BP Location: Left arm, Patient Position: Sitting, Cuff Size: Adult Large)   Pulse 97   Wt 108.3 kg (238 lb 12.8 oz)   BMI 32.38 kg/m       Pulse Readings from Last 5 Encounters:   02/02/23 97   01/12/23 96   12/08/22 74   11/14/22 115   11/08/22 79     Wt Readings from Last 5 Encounters:   02/02/23 108.3 kg (238 lb 12.8 oz)   01/12/23 110.1 kg (242 lb 12.8 oz)   12/08/22 109 kg (240 lb 6.4 oz)   11/14/22 108 kg (238 lb)   11/08/22 105.5 kg (232 lb 9.6 oz)     BP Readings from Last 5 Encounters:   02/02/23 (!) 135/91   01/12/23 134/89   12/08/22 (!) 130/90   11/14/22 127/76   11/08/22 (!) 137/94        MENTAL STATUS EXAM     Alertness: alert  and oriented  Appearance: adequately groomed  Behavior/Demeanor: cooperative, pleasant and calm, with good  eye contact   Speech: normal and regular rate and rhythm  Language: intact and no problems  Psychomotor: normal or unremarkable  Mood: \"doing well\"  Affect: full range; congruent to: mood- yes, content- yes  Thought Process/Associations: unremarkable  Thought Content:  Reports none;  Denies suicidal & violent ideation and delusions  Perception:  Reports none;  Denies none  Insight: good  Judgment: good  Cognition: does  appear grossly intact; formal cognitive testing was not done  Gait and Station: unremarkable     LABS and DATA     PHQ 5/14/2021 6/18/2021 1/3/2022   PHQ-9 Total Score 21 15 6   Q9: Thoughts of better off dead/self-harm past 2 weeks Nearly every day More than half the days Not at all   F/U: Thoughts of suicide or self-harm Yes Yes -   F/U: Self harm-plan No No -   F/U: Self-harm action No No -   F/U: Safety concerns No No -       Recent Labs   Lab Test 11/08/22  0844 " 06/02/21  0827   GLC 96 91     No lab results found.  Recent Labs   Lab Test 11/08/22  0844 06/02/21  0827   AST 23 28   ALT 54 49   ALKPHOS 128 90       Recent Labs   Lab Test 12/08/22  0845 05/24/22  1159 06/02/21  0827   LITHIUM 0.4 0.5 0.42*     Recent Labs   Lab Test 11/08/22  0844 06/02/21  0827   CR 1.25 1.14   GFRESTIMATED 82 90    140   POTASSIUM 5.2 4.1   JODI 10.0 9.2     Recent Labs   Lab Test 11/08/22  0844 06/02/21  0827   SG 1.020 1.025     Recent Labs   Lab Test 11/08/22  0844 06/02/21  0827   TSH 1.81 0.98     Recent Labs   Lab Test 11/08/22  0844 05/24/22  1159 06/02/21  0827   WBC 6.9 7.3 5.6   ANEU  --   --  3.2       Component      Latest Ref Rng & Units 6/2/2021 5/24/2022 12/8/2022   Lithium Level      Mmol/L     0.42 (L) 0.5 0.4     Comment: Therapeutic: 0.60 - 1.20 mmol/L;   Potentially toxic: >1.50 mmol/L;   Severe toxicity: >2.50 mmol/L     No ECG in the chart.      PSYCHOTROPIC DRUG INTERACTIONS                                                       PSYCHCLINICDDI     Additive for serotonin syndrome risk: lithium + sertraline    Guanfacine + Vyvanse may affect heart rhythm      MANAGEMENT:  Monitoring for adverse effects, routine vitals and routine labs     RISK STATEMENT for SAFETY     Will did not appear to be an imminent safety risk to self or others.    TREATMENT RISK STATEMENT: The risks, benefits, alternatives and potential adverse effects have been discussed and are understood by the pt. The pt understands the risks of using street drugs or alcohol. There are no medical contraindications, the pt agrees to treatment with the ability to do so. The pt knows to call the clinic for any problems or to access emergency care if needed.  Medical and substance use concerns are documented above.  Psychotropic drug interaction check was done, including changes made today.     PROVIDER: Anastasia Perrin MD, PhD    Patient staffed in clinic with Dr. Lozano who will sign the note.  Supervisor is  Dr. Lozano.

## 2023-02-03 NOTE — TELEPHONE ENCOUNTER
Medication requested: sertraline (ZOLOFT) 50 MG tablet  Last refilled: 2/2/23  Qty: 30/1      Last seen: 2/2/23  RTC: 3-4 weeks  Cancel: 0  No-show: 0  Next appt: 3/7/23    Refill decision:   Refilled for 30 days per protocol.  90 DAYS AS NEEDED FOR INS.  Warnings Override History for lithium ER (LITHOBID) 300 MG CR tablet [[id number]]    Overridden by Anastasia Perrin MD on Feb 2, 2023 9:28 AM   Drug-Drug   1. SEROTONERGIC AGENTS (HIGH RISK, MISCELLANEOUS) / SELECTIVE SEROTONIN REUPTAKE INHIBITORS [Level: Major] [Reason: Will monitor drug levels/drug effects ]   Other Orders: sertraline (ZOLOFT) 50 MG tablet      2. SEROTONERGIC AGENTS (HIGH RISK, MISCELLANEOUS) / SELECTIVE SEROTONIN REUPTAKE INHIBITORS [Level: Major] [Reason: Will monitor drug levels/drug effects ]   Other Orders: sertraline (ZOLOFT) 50 MG tablet

## 2023-02-07 ENCOUNTER — TELEPHONE (OUTPATIENT)
Dept: PSYCHIATRY | Facility: CLINIC | Age: 28
End: 2023-02-07
Payer: COMMERCIAL

## 2023-02-07 NOTE — TELEPHONE ENCOUNTER
Faxed lithium 300mg refill request. Per chart, Dr. Alamo is not current prescriber. Lithium 300mg last rx'd 2/3/23 by Dr. Lozano. No refill needed at this time. Pharmacy informed.    Aleisha Green RN on 2/7/2023 at 3:44 PM

## 2023-03-06 NOTE — PROGRESS NOTES
[facility]  Psychiatry Clinic  MEDICAL PROGRESS NOTE     CARE TEAM:  PCP- Physician No Ref-Primary, Psychotherapist- None     Lux Bailey is a 27 year old who prefers the name Regino and uses pronouns he, him, himself    This patient has been seen by previous providers in the [facility]. Sections of the history below have been copied from previous diagnostic assessments and were independently confirmed and updated as needed during this appointment.     DIAGNOSES     Bipolar disorder I, severe, most recent episode manic, with psychotic features, in full remission  ADHD, inattentive type     Other:  Obstructive sleep apnea, on CPAP     ASSESSMENT     Regino reports that his mood has been stable since the last visit. Denies any depression, marline or psychosis symptoms. ADHD symptoms are well controlled with Vyvanse.    He has been using CPAP regularly and feels it has helped his elevated BP. BP is 133/89 mmHg today.     As planned, he is moving to [facility] on 3/17/2023. He has not found a new provider yet. He is aware that our clinic can't continue to manage his meds indefinitely after he moves to another state.     Regarding his current lithium dose, his mood has been stable despite subtherapeutic lithium levels (see labs below). No manic episodes since 2018. He is not interested in increasing his lithium dose. Of note, Regino was at higher doses of lithium in 2021 - 750 mg daily, which resulted in a significant increase in lethargy. At 900 mg daily, the patient experienced worsening lethargy, difficulty getting out of bed, and considerable impairment in memory and concentration. His dose was reduced to 600 mg. He self-discontinued 600 mg daily dose on 10/2021, then agreed to resume at 300 mg BID on 11/2021.    Per chart, he has had obsessive-compulsive symptoms and states that these symptoms occurred briefly in the past during a manic episode, and he denied any  current symptoms. OCD was removed from the diagnosis list.    Denies SI, plan, or intent. No safety concerns    We went over the potential side effects of stimulants again. Patient understands that concurrent use of stimulant and antidepressant medications places him at elevated risk for marline. He will monitor his symptoms and understand that if a reduced need for sleep, insomnia, racing thoughts, or significant changes in his mood occur, he will call the clinic or go to an emergency room. He denied suicidal ideation, plan or intent. No safety concerns today.    Future considerations: History of stable mood despite subtherapeutic lithium levels. Consider increasing lithium given concurrent stimulant prescriptions. Consider trazodone or mirtazapine as alternative antidepressant medication. Consider initiation of PDE 5 inhibitor if erectile dysfunction is a concern again. Consider VPA and re-evaluate other SGA trials.      [place]MP was checked today:  Indicates taking controlled medication as prescribed.     PLAN                                                                                                                1) Meds-   - Continue lisdexamfetamine (VYVANSE) 30 mg daily  - Continue lithium ER (LITHOBID) 300 mg BID  - Continue sertraline (ZOLOFT) 50 mg daily  - Continue guanfacine (INTUNIV) 1 MG TB24 24 hr at bedtime     Non prescribed medications:  - Magnesium supplement and daily multivitamin qhs     2) Psychotherapy - Recommended. Referral was made previously.      3) Next due-  Labs- due Nov 2023  EKG- not required for routine monitoring   Rating scales-PHQ 9 as needed     4) Referrals- None     5) Dispo- 4-6  weeks     PERTINENT BACKGROUND                                [most recent eval 07/24/22]     Previous diagnoses include depression, schizophrenia in 2016 when starting college, and ADHD in 2017.  He was initially diagnosed with schizophrenia and placed on antipsychotics for two years. In 2018  "diagnosis changed from schizophrenia to bipolar disorder, and he was put on lamotrigine. Lamotrigine was ineffective, and he was transitioned to lithium at the end of 2018.  Mood has been stable, with no manic episodes since starting lithium.  Mood symptoms have been well managed at subtherapeutic lithium levels.  He recalls he grew up in a family where \"mental illness wasn't real\" but struggled with depression starting in 1st or 2nd grade, \"regularly had teachers pull me aside and ask if everything was OK at home.\" He reports a history of chronic suicidal ideation since 3rd grade, particularly when depressed.  He has never \"actually attempted it.\" He has also had intrusive thoughts \"all the time\" about hurting or killing himself, despite not wanting to self-harm or do it.  Episodes began to get worse and worse, and he \"played the 'things are going to get better when I get to the next stage of life' game.\" He first started medications at the beginning of college, when he had his first manic episode, likely triggered by many factors, including lack of sleep and stress.  He has not had a manic episode since he started lithium in 2018/2019.  In college, he got care through the university clinic.  His psychiatrist advised him to immediately go off all his mood stabilizers, which made him concerned, and he wanted a second opinion.  After transferring care to [facility], he subsequently self-discontinued venlafaxine due to affective flattening.     Psych pertinent item history includes suicidal ideation, multiple psychotropic trials , psych hosp (x1, but several psych ER visits) and substance use: alcohol, cannabis and hallucinogens     SUBJECTIVE      Today:   - Will reports that he has been doing well overall.   - Denies psychosis, marline, or depressive symptoms.  - Stressed and feels anxious with the move, 3/17/23, manageable  - Rented a truck and will drive to [place], [place], with the help of his girlfriend and family  - Has " a good family support   - Leaving [place] on 3/17/2023.l  - Looking for a new provider in [place].  - ADHD symptoms are well controlled with Vyvanse.  - Vyvanse is not covered by his insurance. Patient is paying out of pocket.   - Will continue to monitor BP. BP is normal today.   - States he continues to use his CPAP, and his sleep is good.   - No change in substance use.  - Denies SI, plan, or intent. No safety concerns today    Recent Social History: see below    Recent Psych Symptoms:   Depression:  denies  Elevated:  none  Psychosis:  none  Anxiety:  anxious about the move to [facility], managable  Trauma Related:  none  Sleep: no and denies significant sleep issues, CARLOS on CPAP  Other: no     Adverse Effects: none   Pertinent Negative Symptoms: No suicidal or violent ideation, psychosis, hallucinations or marline, hypomania    Recent Substance Use:     Alcohol- none  Tobacco- none  Caffeine- 1 cup of coffee per day   Cannabis- none     Opioids- none           Narcan Kit- n/a   Other illicit drugs- none, no use in 2 years    Adverse Effects: none    PSYCH and SUBSTANCE USE Critical Summary Points since July 2021 6/18/21: Stopped effexor on own  7/26/21: Lithium increased to 750 mg for 2 weeks then to 900 mg if well-tolerated.  8/23/21: Lithium return to 600 mg daily.  Cariprazine 1.5 mg initiated  10/4/21: Concerta increased to 54 mg daily. Lithium self-discontinued  11/29/21: Lithium resumed at 300 mg qam for 14 days, then increase to 300 BID. Concerta decreased to 36 mg daily.  1/3/22: No changes  3/4/22: Start sertraline 50 mg daily  4/1/22: No medication changes, sleep medicine referral placed  5/6/22: Start methylphenidate 5 mg daily at 2 PM  6/17/22: No changes    7/25/2022: No medication change   9/6/2022: No medication changes  10/11/2022: Started guanfacine (INTUNIV) 1 MG TB24 24 hr at bedtime, discontinued methylphenidate 5 mg daily at 2 PM (patient not taking it)   11/7/2022: No medication changes.    12/8/2022: No medication changes  1/12/2023: Discontinued methylphenidate (CONCERTA) 36 mg daily and started Vyvanse 30 mg daily  2/2/2023:  Doing well. No medication changes. Last visit at the clinic. Moving to [place] on 3/17/2023. Three months refills are provided.   3/7/2023. Doing well. Last visit at the clinic. No medications changes. Refills provided.     FAMILY and SOCIAL HISTORY                                 pt reported     Family Hx:   Father, paternal grandfather with alcoholism.  Maternal grandfather and mother have bipolar disorder.   Middle brother with ADHD and autism  Parkinson's and Alzheimer's on both sides of family. Nothing diagnosed in the immediate family.      Social Hx:  Financial/ Work- Working,  for [employer]  Partner/ - girlfriend  Children- None      Living situation- lives in [place] in apartment alone.   Social/ Spiritual Support- friends, girlfriend, family      Feels Safe at Home- yes   Legal- None     Trauma History (self-report)- None    Early History/Education- Born in [place], [place]. Moved often in childhood in [place], [place], [place], [place], [place]. Grew up poor on food stamps during childhood. Did well academically until high school, not many friends until high school. Oldest of 3 brothers. Didn't finish college d/t COVID and related job loss. Dual majored in informatics and math, minors in bio and computer science. 1 semester away from graduation. Lived on own since getting job.     PAST PSYCHIATRIC HISTORY     SIB- None  Suicide Attempt [#, most recent]- None  Suicidal Ideation Hx- yes, near overdose on APAP in 2018     Violence/Aggression Hx- None  Psychosis Hx- yes, during marline, auditory hallucinations  Eating Disorder Hx- yes, restricting, binging in college. Better now.   Other- None     Psych Hosp [#, most recent]- 1, in 2018 for marline in the context of insomnia and substance use (LSD and ketamine)  Commitment- None  ECT- None  Outpatient Programs - None  Other - N/A      "PAST MED TRIALS     Medication Max Dose (mg) Dates / Duration Helpful? DC Reason / Adverse Effects?   lamotrigine       Lack of efficacy   lithium 900 current Y Fulshear \"on edge\" at higher doses (900), severe lethargy and memory impairment at doses above 600 mg. Despite side effects works better than other mood stabilizer trials.   bupropion       Maria M, while on lithium   escitalopram       Maria M, while on lithium, OCD   atomoxetine     N Hot flashes, nausea. 1 month trial   Adderall    7221-9746 Y \"I hated it\" moderate efficacy   lisdexamfetamine     N Lack of efficacy?   dexadrine       Felt like a zombie, dissociated    lurasidone       Somnolence, cognitive slowing   methylphenidate 54 current Y Wears off quick, emotional blunting and low libido.    venlafaxine  150  [id number] Y  initially helpful, discontinued due to affect of flattening and emotional blunting.   cariprazine  1.5  2021 to current Y     risperidone       Cognitive slowing   aripirazole       Tremors, cognitive slowing   ziprazidone       Cognitive slowing   olanzapine       Cognitive slowing   cariprazine 1.5 2021-current Y Weight gain         PAST SUBSTANCE USE HISTORY     Past Use-  Numerous drugs in college, former cannabis use. LSD, ketamine, cannabis, DMT, psylocybin, research chemicals. No opioids or benzodiazepines  Treatment- #, most recent- no  Medical Consequences- no  Legal Consequences- no  Other- N/A     MEDICAL HISTORY and ALLERGY     ALLERGIES: Patient has no known allergies.    There is no problem list on file for this patient.       MEDICAL REVIEW OF SYSTEMS     Contraception- Vasectomy,  Pregnant- N/A    A comprehensive review of systems was performed and is negative other than noted in the HPI.     MEDICATIONS     Current Outpatient Medications   Medication Sig Dispense Refill     guanFACINE (INTUNIV) 1 MG TB24 24 hr tablet Take 1 tablet (1 mg) by mouth At Bedtime 30 tablet 1     lisdexamfetamine (VYVANSE) 30 MG capsule Take 1 " "capsule (30 mg) by mouth every morning 30 capsule 0     lithium ER (LITHOBID) 300 MG CR tablet Take 1 tablet (300 mg) by mouth 2 times daily 60 tablet 1     sertraline (ZOLOFT) 50 MG tablet Take 1 tablet (50 mg) by mouth daily 90 tablet 0      VITALS   /89 (BP Location: Left arm, Patient Position: Sitting, Cuff Size: Adult Regular)   Pulse 83   Wt 108 kg (238 lb 3.2 oz)   BMI 32.30 kg/m       Pulse Readings from Last 5 Encounters:   03/07/23 83   02/02/23 97   01/12/23 96   12/08/22 74   11/14/22 115     Wt Readings from Last 5 Encounters:   03/07/23 108 kg (238 lb 3.2 oz)   02/02/23 108.3 kg (238 lb 12.8 oz)   01/12/23 110.1 kg (242 lb 12.8 oz)   12/08/22 109 kg (240 lb 6.4 oz)   11/14/22 108 kg (238 lb)     BP Readings from Last 5 Encounters:   03/07/23 133/89   02/02/23 (!) 135/91   01/12/23 134/89   12/08/22 (!) 130/90   11/14/22 127/76        MENTAL STATUS EXAM     Alertness: alert  and oriented  Appearance: adequately groomed  Behavior/Demeanor: cooperative, pleasant and calm, with good  eye contact   Speech: normal and regular rate and rhythm  Language: intact and no problems  Psychomotor: normal or unremarkable  Mood: \"doing well\"  Affect: full range; congruent to: mood- yes, content- yes  Thought Process/Associations: unremarkable  Thought Content:  Reports none;  Denies suicidal & violent ideation and delusions  Perception:  Reports none;  Denies none  Insight: good  Judgment: good  Cognition: does  appear grossly intact; formal cognitive testing was not done  Gait and Station: unremarkable     LABS and DATA     PHQ 5/14/2021 6/18/2021 1/3/2022   PHQ-9 Total Score 21 15 6   Q9: Thoughts of better off dead/self-harm past 2 weeks Nearly every day More than half the days Not at all   F/U: Thoughts of suicide or self-harm Yes Yes -   F/U: Self harm-plan No No -   F/U: Self-harm action No No -   F/U: Safety concerns No No -       Recent Labs   Lab Test 11/08/22  0844 06/02/21  0827   GLC 96 91     No " lab results found.  Recent Labs   Lab Test 11/08/22  0844 06/02/21  0827   AST 23 28   ALT 54 49   ALKPHOS 128 90       Recent Labs   Lab Test 12/08/22  0845 05/24/22  1159 06/02/21  0827   LITHIUM 0.4 0.5 0.42*     Recent Labs   Lab Test 11/08/22  0844 06/02/21 0827   CR 1.25 1.14   GFRESTIMATED 82 90    140   POTASSIUM 5.2 4.1   JODI 10.0 9.2     Recent Labs   Lab Test 11/08/22  0844 06/02/21  0827   SG 1.020 1.025     Recent Labs   Lab Test 11/08/22  0844 06/02/21 0827   TSH 1.81 0.98     Recent Labs   Lab Test 11/08/22  0844 05/24/22  1159 06/02/21 0827   WBC 6.9 7.3 5.6   ANEU  --   --  3.2       Component      Latest Ref Rng & Units 6/2/2021 5/24/2022 12/8/2022   Lithium Level      Mmol/L     0.42 (L) 0.5 0.4     Comment: Therapeutic: 0.60 - 1.20 mmol/L;   Potentially toxic: >1.50 mmol/L;   Severe toxicity: >2.50 mmol/L     No ECG in the chart.      PSYCHOTROPIC DRUG INTERACTIONS                                                       PSYCHCLINICDDI     Additive for serotonin syndrome risk: lithium + sertraline    Guanfacine + Vyvanse may affect heart rhythm      MANAGEMENT:  Monitoring for adverse effects, routine vitals and routine labs     RISK STATEMENT for SAFETY     Will did not appear to be an imminent safety risk to self or others.    TREATMENT RISK STATEMENT: The risks, benefits, alternatives and potential adverse effects have been discussed and are understood by the pt. The pt understands the risks of using street drugs or alcohol. There are no medical contraindications, the pt agrees to treatment with the ability to do so. The pt knows to call the clinic for any problems or to access emergency care if needed.  Medical and substance use concerns are documented above.  Psychotropic drug interaction check was done, including changes made today.     PROVIDER: Anastasia Perrin MD, PhD    Patient was discussed with Dr. Alamo.     Patient not staffed in clinic.  Note will be reviewed and signed by  supervisor Dr. Lozano.

## 2023-03-07 ENCOUNTER — OFFICE VISIT (OUTPATIENT)
Dept: PSYCHIATRY | Facility: CLINIC | Age: 28
End: 2023-03-07
Attending: PSYCHIATRY & NEUROLOGY
Payer: COMMERCIAL

## 2023-03-07 VITALS
DIASTOLIC BLOOD PRESSURE: 89 MMHG | HEART RATE: 83 BPM | BODY MASS INDEX: 32.3 KG/M2 | SYSTOLIC BLOOD PRESSURE: 133 MMHG | WEIGHT: 238.2 LBS

## 2023-03-07 DIAGNOSIS — F31.70 BIPOLAR I DISORDER IN REMISSION (H): ICD-10-CM

## 2023-03-07 DIAGNOSIS — F90.0 ATTENTION DEFICIT HYPERACTIVITY DISORDER (ADHD), PREDOMINANTLY INATTENTIVE TYPE: ICD-10-CM

## 2023-03-07 PROCEDURE — 99214 OFFICE O/P EST MOD 30 MIN: CPT | Mod: HN | Performed by: STUDENT IN AN ORGANIZED HEALTH CARE EDUCATION/TRAINING PROGRAM

## 2023-03-07 PROCEDURE — G0463 HOSPITAL OUTPT CLINIC VISIT: HCPCS | Performed by: STUDENT IN AN ORGANIZED HEALTH CARE EDUCATION/TRAINING PROGRAM

## 2023-03-07 RX ORDER — LITHIUM CARBONATE 300 MG/1
300 TABLET, FILM COATED, EXTENDED RELEASE ORAL 2 TIMES DAILY
Qty: 180 TABLET | Refills: 0 | Status: SHIPPED | OUTPATIENT
Start: 2023-03-07 | End: 2023-12-19

## 2023-03-07 RX ORDER — GUANFACINE 1 MG/1
1 TABLET, EXTENDED RELEASE ORAL AT BEDTIME
Qty: 90 TABLET | Refills: 0 | Status: SHIPPED | OUTPATIENT
Start: 2023-03-07 | End: 2023-06-13

## 2023-03-07 ASSESSMENT — PAIN SCALES - GENERAL: PAINLEVEL: NO PAIN (0)

## 2023-03-08 RX ORDER — LISDEXAMFETAMINE DIMESYLATE 30 MG/1
30 CAPSULE ORAL EVERY MORNING
Qty: 30 CAPSULE | Refills: 0 | Status: SHIPPED | OUTPATIENT
Start: 2023-03-08 | End: 2024-03-13

## 2023-06-04 ENCOUNTER — HEALTH MAINTENANCE LETTER (OUTPATIENT)
Age: 28
End: 2023-06-04

## 2023-06-12 DIAGNOSIS — F90.0 ATTENTION DEFICIT HYPERACTIVITY DISORDER (ADHD), PREDOMINANTLY INATTENTIVE TYPE: ICD-10-CM

## 2023-06-13 RX ORDER — GUANFACINE 1 MG/1
1 TABLET, EXTENDED RELEASE ORAL AT BEDTIME
Qty: 30 TABLET | Refills: 0 | Status: SHIPPED | OUTPATIENT
Start: 2023-06-13 | End: 2024-03-06

## 2023-06-13 NOTE — TELEPHONE ENCOUNTER
guanFACINE (INTUNIV) 1 MG   Last refilled: 3/7/23  Qty: 90    Last seen: 3/7/23  RTC: 4-6 WEEEKS  Cancel: 0  No-show: 0  Next appt: NONE  30 day laura refill sent to the pharmacy - including instructions for patient to call the clinic and schedule an appointment.  Scheduling has been notified to contact the pt for appointment.

## 2023-07-23 DIAGNOSIS — F31.70 BIPOLAR I DISORDER IN REMISSION (H): ICD-10-CM

## 2023-07-23 RX ORDER — LITHIUM CARBONATE 300 MG/1
TABLET, FILM COATED, EXTENDED RELEASE ORAL
Qty: 180 TABLET | Refills: 0 | OUTPATIENT
Start: 2023-07-23

## 2023-08-19 DIAGNOSIS — F31.70 BIPOLAR I DISORDER IN REMISSION (H): ICD-10-CM

## 2023-12-11 ENCOUNTER — OFFICE VISIT (OUTPATIENT)
Dept: FAMILY MEDICINE | Facility: CLINIC | Age: 28
End: 2023-12-11
Payer: COMMERCIAL

## 2023-12-11 VITALS
HEART RATE: 88 BPM | RESPIRATION RATE: 11 BRPM | SYSTOLIC BLOOD PRESSURE: 130 MMHG | HEIGHT: 72 IN | TEMPERATURE: 97.5 F | DIASTOLIC BLOOD PRESSURE: 88 MMHG | WEIGHT: 246 LBS | OXYGEN SATURATION: 98 % | BODY MASS INDEX: 33.32 KG/M2

## 2023-12-11 DIAGNOSIS — F50.9 EATING DISORDER, UNSPECIFIED TYPE: ICD-10-CM

## 2023-12-11 DIAGNOSIS — Z00.00 ENCOUNTER FOR ANNUAL PHYSICAL EXAM: Primary | ICD-10-CM

## 2023-12-11 DIAGNOSIS — G47.30 SLEEP APNEA, UNSPECIFIED TYPE: ICD-10-CM

## 2023-12-11 DIAGNOSIS — R63.5 WEIGHT GAIN: ICD-10-CM

## 2023-12-11 DIAGNOSIS — Z23 ENCOUNTER FOR IMMUNIZATION: ICD-10-CM

## 2023-12-11 DIAGNOSIS — F31.9 BIPOLAR I DISORDER (H): ICD-10-CM

## 2023-12-11 DIAGNOSIS — Z51.81 ENCOUNTER FOR THERAPEUTIC DRUG MONITORING: ICD-10-CM

## 2023-12-11 DIAGNOSIS — Z13.220 SCREENING CHOLESTEROL LEVEL: ICD-10-CM

## 2023-12-11 LAB
ANION GAP SERPL CALCULATED.3IONS-SCNC: 12 MMOL/L (ref 7–15)
BUN SERPL-MCNC: 16.9 MG/DL (ref 6–20)
CALCIUM SERPL-MCNC: 10.1 MG/DL (ref 8.6–10)
CHLORIDE SERPL-SCNC: 102 MMOL/L (ref 98–107)
CHOLEST SERPL-MCNC: 213 MG/DL
CREAT SERPL-MCNC: 1.11 MG/DL (ref 0.67–1.17)
DEPRECATED HCO3 PLAS-SCNC: 25 MMOL/L (ref 22–29)
EGFRCR SERPLBLD CKD-EPI 2021: >90 ML/MIN/1.73M2
FASTING STATUS PATIENT QL REPORTED: YES
GLUCOSE SERPL-MCNC: 101 MG/DL (ref 70–99)
HDLC SERPL-MCNC: 33 MG/DL
LDLC SERPL CALC-MCNC: 144 MG/DL
LITHIUM SERPL-SCNC: <0.1 MMOL/L (ref 0.6–1.2)
NONHDLC SERPL-MCNC: 180 MG/DL
POTASSIUM SERPL-SCNC: 4.4 MMOL/L (ref 3.4–5.3)
SODIUM SERPL-SCNC: 139 MMOL/L (ref 135–145)
TRIGL SERPL-MCNC: 182 MG/DL
TSH SERPL DL<=0.005 MIU/L-ACNC: 1.27 UIU/ML (ref 0.3–4.2)

## 2023-12-11 PROCEDURE — 99214 OFFICE O/P EST MOD 30 MIN: CPT | Mod: 25

## 2023-12-11 PROCEDURE — 90686 IIV4 VACC NO PRSV 0.5 ML IM: CPT

## 2023-12-11 PROCEDURE — 80061 LIPID PANEL: CPT

## 2023-12-11 PROCEDURE — 99395 PREV VISIT EST AGE 18-39: CPT | Mod: 25

## 2023-12-11 PROCEDURE — 90480 ADMN SARSCOV2 VAC 1/ONLY CMP: CPT

## 2023-12-11 PROCEDURE — 80178 ASSAY OF LITHIUM: CPT

## 2023-12-11 PROCEDURE — 36415 COLL VENOUS BLD VENIPUNCTURE: CPT

## 2023-12-11 PROCEDURE — 84443 ASSAY THYROID STIM HORMONE: CPT

## 2023-12-11 PROCEDURE — 91320 SARSCV2 VAC 30MCG TRS-SUC IM: CPT

## 2023-12-11 PROCEDURE — 80048 BASIC METABOLIC PNL TOTAL CA: CPT

## 2023-12-11 PROCEDURE — 90471 IMMUNIZATION ADMIN: CPT

## 2023-12-11 ASSESSMENT — ENCOUNTER SYMPTOMS
PALPITATIONS: 0
ABDOMINAL PAIN: 0
FEVER: 0
WEAKNESS: 0
HEARTBURN: 0
CHILLS: 0
NERVOUS/ANXIOUS: 0
SORE THROAT: 0
EYE PAIN: 0
NAUSEA: 0
CONSTIPATION: 0
DIARRHEA: 0
SHORTNESS OF BREATH: 0
HEMATURIA: 0
JOINT SWELLING: 0
ARTHRALGIAS: 0
HEADACHES: 0
MYALGIAS: 0
HEMATOCHEZIA: 0
COUGH: 0
DIZZINESS: 0
DYSURIA: 0
PARESTHESIAS: 0
FREQUENCY: 0

## 2023-12-11 ASSESSMENT — PAIN SCALES - GENERAL: PAINLEVEL: NO PAIN (0)

## 2023-12-11 NOTE — PROGRESS NOTES
SUBJECTIVE:   Will is a 28 year old, presenting for the following:  Physical        12/11/2023     7:52 AM   Additional Questions   Roomed by Lorraine Plascencia       Healthy Habits:     Getting at least 3 servings of Calcium per day:  Yes    Bi-annual eye exam:  Yes    Dental care twice a year:  Yes    Sleep apnea or symptoms of sleep apnea:  Excessive snoring and Sleep apnea    Diet:  Regular (no restrictions)    Frequency of exercise:  2-3 days/week    Duration of exercise:  30-45 minutes    Taking medications regularly:  Yes    Medication side effects:  None    Additional concerns today:  Yes    Needs referral for psychiatry for bipolar type 1 and generic zoloft.    Sleep referral: mask no longer fits.    Patient is hoping: over the past 3 years since he got out of college, he has gained about 50 pounds after getting out of college. Still works out, and unable to lose weight. 3 days: 1.5  hours at a time. Usually, what he does is gym: 1-1.5 mile jogs and weight lifting for about 1 hour.  May have an issue with binge eating (not diagnosed with this). Has tried calorie restriction and calorie counting/restriction. He has tried in 2021, he did the paleo diet.  Comes with stress, he will eat too much, and repeat the cycle.     Lithium for the last 6 years: manic episode last time was 2018. 5 months ago, patient had a depressive episode after losing his job. Always right below threshold. Never misses doses of his lithium. He had halved his dose for a time to spread out his dosing scheme.    Occupation: Works for [employer] (largest [employer] in the world).    Diet: Does not gravitate towards candy. Carbs are a big thing for him. 1-2 servings of vegetables per day.     Girlfriend- Ashley - 3 years.    Today's PHQ-2 Score:       12/11/2023     6:07 AM   PHQ-2 ( 1999 Pfizer)   Q1: Little interest or pleasure in doing things 1   Q2: Feeling down, depressed or hopeless 1   PHQ-2 Score 2   Q1: Little interest or pleasure  "in doing things Several days   Q2: Feeling down, depressed or hopeless Several days   PHQ-2 Score 2             Have you ever done Advance Care Planning? (For example, a Health Directive, POLST, or a discussion with a medical provider or your loved ones about your wishes): No, advance care planning information given to patient to review.  Patient plans to discuss their wishes with loved ones or provider.      Social History     Tobacco Use    Smoking status: Never    Smokeless tobacco: Never   Substance Use Topics    Alcohol use: Yes     Comment: 1 glass whiskey once per month             12/11/2023     6:07 AM   Alcohol Use   Prescreen: >3 drinks/day or >7 drinks/week? Not Applicable          No data to display                Last PSA: No results found for: \"PSA\"      Reviewed and updated as needed this visit by clinical staff   Tobacco  Allergies  Meds     Fam Hx          Reviewed and updated as needed this visit by Provider   Tobacco       Fam Hx             Review of Systems   Constitutional:  Negative for chills and fever.   HENT:  Negative for congestion, ear pain, hearing loss and sore throat.    Eyes:  Negative for pain and visual disturbance.   Respiratory:  Negative for cough and shortness of breath.    Cardiovascular:  Negative for chest pain, palpitations and peripheral edema.   Gastrointestinal:  Negative for abdominal pain, constipation, diarrhea, heartburn, hematochezia and nausea.   Genitourinary:  Negative for dysuria, frequency, genital sores, hematuria, impotence, penile discharge and urgency.   Musculoskeletal:  Negative for arthralgias, joint swelling and myalgias.   Skin:  Negative for rash.   Neurological:  Negative for dizziness, weakness, headaches and paresthesias.   Psychiatric/Behavioral:  Negative for mood changes. The patient is not nervous/anxious.        OBJECTIVE:   /88   Pulse 88   Temp 97.5  F (36.4  C) (Temporal)   Resp 11   Ht 1.824 m (5' 11.81\")   Wt 111.6 kg " (246 lb)   SpO2 98%   BMI 33.54 kg/m      Physical Exam  GENERAL: healthy, alert and no distress  EYES: Eyes grossly normal to inspection, PERRL and conjunctivae and sclerae normal  HENT: ear canals and TM's normal, nose and mouth without ulcers or lesions  NECK: no adenopathy, no asymmetry, masses, or scars and thyroid normal to palpation  RESP: lungs clear to auscultation - no rales, rhonchi or wheezes  CV: regular rate and rhythm, normal S1 S2, no S3 or S4, no murmur, click or rub, no peripheral edema and peripheral pulses strong  ABDOMEN: soft, nontender, no hepatosplenomegaly, no masses and bowel sounds normal  : discussed self exam  MS: no gross musculoskeletal defects noted, no edema  SKIN: no suspicious lesions or rashes  NEURO: Normal strength and tone, mentation intact and speech normal  PSYCH: mentation appears normal, affect normal/bright        ASSESSMENT/PLAN:   (Z00.00) Encounter for annual physical exam  (primary encounter diagnosis)    (F31.9) Bipolar I disorder (H)  Plan: Adult Mental Health  Referral, Lithium        level, Basic metabolic panel  (Ca, Cl, CO2,         Creat, Gluc, K, Na, BUN)  Patient reports he has about 1 month left or so of his medication.  He has not had manic episodes since 2018, although he did have a depressive episode about 5 months ago after losing his job.  I am willing to bridge him to his psychiatry appointment if needed with refills given that he has also been  consistent with his dose for a long time.    (F50.9) Eating disorder, unspecified type  Psychiatry referral also placed.    (Z51.81) Encounter for therapeutic drug monitoring  Plan: Lithium level, Basic metabolic panel  (Ca, Cl,         CO2, Creat, Gluc, K, Na, BUN), TSH with free T4        reflex    (Z13.220) Screening cholesterol level  Plan: Lipid panel reflex to direct LDL Fasting    (R63.5) Weight gain, BMI 33  Chronic, uncontrolled  Plan: Semaglutide-Weight Management (WEGOVY) 0.25          "MG/0.5ML pen  Patient has struggled with changes in weight since graduation from college, reporting about 50 pound weight gain.  He has tried low calorie and calorie restriction diets without efficacy.  He also has an underlying binge eating problem when stress occurs.  We discussed some different options including GLP's versus following up with the weight management clinic.    (Z23) Encounter for immunization  Plan: INFLUENZA VACCINE IM > 6 MONTHS VALENT IIV4         (AFLURIA/FLUZONE), COVID-19 12+ (2023-24)         (PFIZER)    (G47.30) Sleep apnea, unspecified type  Plan: Adult Sleep Eval & Management          Referral      Patient has been advised of split billing requirements and indicates understanding: Yes      COUNSELING:   Reviewed preventive health counseling, as reflected in patient instructions       Regular exercise       Healthy diet/nutrition      BMI:   Estimated body mass index is 33.54 kg/m  as calculated from the following:    Height as of this encounter: 1.824 m (5' 11.81\").    Weight as of this encounter: 111.6 kg (246 lb).   Weight management plan: Discussed healthy diet and exercise guidelines      He reports that he has never smoked. He has never used smokeless tobacco.            William Ramos NP  [facility]  "

## 2023-12-12 ENCOUNTER — TELEPHONE (OUTPATIENT)
Dept: FAMILY MEDICINE | Facility: CLINIC | Age: 28
End: 2023-12-12
Payer: COMMERCIAL

## 2023-12-12 DIAGNOSIS — R63.5 WEIGHT GAIN: ICD-10-CM

## 2023-12-12 NOTE — TELEPHONE ENCOUNTER
Prior Authorization Retail Medication Request    Medication/Dose:   Diagnosis and ICD code (if different than what is on RX):  BMI 33-33.9, Weight gain (Z68.33; R63.5)  New/renewal/insurance change PA/secondary ins. PA:  Previously Tried and Failed:  Lifestyle interventions: calorie restriction  Rationale:  Prevention of cardiovascular and endocrine complications associated with obesity    Insurance   Primary: [facility]  Insurance ID:  [id number]

## 2023-12-12 NOTE — LETTER
December 12, 2023      Lux Bailey  [address] [place] [id number]        To Whom It May Concern,    I have been following this patient related to weight gain  Over the past 3 years since he got out of college, patient has gained about 50 pounds. He has tried dietary and exercise interventions without success. He works out for 3 days/week: 1.5  hours at a time. His gym routine includes 1-1.5 mile jogs and weight lifting for about 1 hour per occasion.  He has tried calorie restriction and calorie counting without success, as well as the paleo diet. He has also struggled with binge eating in response to stress as well.  Based on his BMI of 33 and GLP-agonists being first line, I think it is medically necessary for him to start Wegovy.    Please contact our office if you have any questions.    William DEXTER          Sincerely,        William Ramos, NP

## 2023-12-14 NOTE — TELEPHONE ENCOUNTER
Prior Authorization Approval    Medication: SEMAGLUTIDE-WEIGHT MANAGEMENT 0.25 MG/0.5ML SC SOAJ  Authorization Effective Date: 12/11/2023  Authorization Expiration Date: 7/8/2024  Approved Dose/Quantity:   Reference #:     Insurance Company: [employer] - Phone [phone] Fax [phone]  Which Pharmacy is filling the prescription: [id number] [place] [address]  Pharmacy Notified: y  Patient Notified: y - pharmacy to notify

## 2023-12-19 DIAGNOSIS — F31.70 BIPOLAR I DISORDER IN REMISSION (H): ICD-10-CM

## 2023-12-19 RX ORDER — LITHIUM CARBONATE 300 MG/1
300 TABLET, FILM COATED, EXTENDED RELEASE ORAL 2 TIMES DAILY
Qty: 180 TABLET | Refills: 0 | Status: SHIPPED | OUTPATIENT
Start: 2023-12-19 | End: 2024-03-06

## 2024-03-06 ENCOUNTER — OFFICE VISIT (OUTPATIENT)
Dept: PSYCHIATRY | Facility: CLINIC | Age: 29
End: 2024-03-06
Payer: COMMERCIAL

## 2024-03-06 VITALS
DIASTOLIC BLOOD PRESSURE: 84 MMHG | HEIGHT: 72 IN | WEIGHT: 243.08 LBS | BODY MASS INDEX: 32.92 KG/M2 | HEART RATE: 88 BPM | SYSTOLIC BLOOD PRESSURE: 139 MMHG | OXYGEN SATURATION: 95 %

## 2024-03-06 DIAGNOSIS — F50.9 EATING DISORDER, UNSPECIFIED TYPE: ICD-10-CM

## 2024-03-06 DIAGNOSIS — F90.0 ATTENTION DEFICIT HYPERACTIVITY DISORDER (ADHD), PREDOMINANTLY INATTENTIVE TYPE: Primary | ICD-10-CM

## 2024-03-06 DIAGNOSIS — Z79.899 LONG TERM USE OF DRUG: ICD-10-CM

## 2024-03-06 DIAGNOSIS — F31.9 BIPOLAR I DISORDER (H): ICD-10-CM

## 2024-03-06 PROCEDURE — 99417 PROLNG OP E/M EACH 15 MIN: CPT | Performed by: NURSE PRACTITIONER

## 2024-03-06 PROCEDURE — 99215 OFFICE O/P EST HI 40 MIN: CPT | Performed by: NURSE PRACTITIONER

## 2024-03-06 RX ORDER — DEXTROAMPHETAMINE SACCHARATE, AMPHETAMINE ASPARTATE MONOHYDRATE, DEXTROAMPHETAMINE SULFATE AND AMPHETAMINE SULFATE 5; 5; 5; 5 MG/1; MG/1; MG/1; MG/1
20 CAPSULE, EXTENDED RELEASE ORAL DAILY
Qty: 30 CAPSULE | Refills: 0 | Status: SHIPPED | OUTPATIENT
Start: 2024-03-06 | End: 2024-04-05

## 2024-03-06 RX ORDER — LITHIUM CARBONATE 300 MG/1
600 TABLET, FILM COATED, EXTENDED RELEASE ORAL DAILY
Qty: 180 TABLET | Refills: 0 | Status: SHIPPED | OUTPATIENT
Start: 2024-03-06 | End: 2024-04-05

## 2024-03-06 ASSESSMENT — ANXIETY QUESTIONNAIRES
1. FEELING NERVOUS, ANXIOUS, OR ON EDGE: NEARLY EVERY DAY
4. TROUBLE RELAXING: NEARLY EVERY DAY
5. BEING SO RESTLESS THAT IT IS HARD TO SIT STILL: NOT AT ALL
7. FEELING AFRAID AS IF SOMETHING AWFUL MIGHT HAPPEN: NOT AT ALL
2. NOT BEING ABLE TO STOP OR CONTROL WORRYING: NOT AT ALL
6. BECOMING EASILY ANNOYED OR IRRITABLE: MORE THAN HALF THE DAYS
3. WORRYING TOO MUCH ABOUT DIFFERENT THINGS: MORE THAN HALF THE DAYS
GAD7 TOTAL SCORE: 10
GAD7 TOTAL SCORE: 10
IF YOU CHECKED OFF ANY PROBLEMS ON THIS QUESTIONNAIRE, HOW DIFFICULT HAVE THESE PROBLEMS MADE IT FOR YOU TO DO YOUR WORK, TAKE CARE OF THINGS AT HOME, OR GET ALONG WITH OTHER PEOPLE: EXTREMELY DIFFICULT

## 2024-03-06 ASSESSMENT — ENCOUNTER SYMPTOMS
CONSTIPATION: 1
CARDIOVASCULAR NEGATIVE: 1
HEADACHES: 0
DIARRHEA: 1
LOSS OF CONSCIOUSNESS: 0
DIZZINESS: 0
RESPIRATORY NEGATIVE: 1
MUSCULOSKELETAL NEGATIVE: 1
TREMORS: 0

## 2024-03-06 ASSESSMENT — PATIENT HEALTH QUESTIONNAIRE - PHQ9: SUM OF ALL RESPONSES TO PHQ QUESTIONS 1-9: 5

## 2024-03-06 NOTE — PROGRESS NOTES
"     [facility] [place] [address], [place], [address]  [place] [address]  [phone]   Collaborative Care Psychiatry  Evaluation  Collaborative Care Psychiatry Service (CCPS) short term psychiatric stabilization model of care reviewed with patient/guardian who verbalized understanding.    Name: Lux Bailey   Preferred name: Regino (male)   : 1995 / 28 year old    Initial consultation on 24.        CARE TEAM:   PCP:Physician No Ref-Primary  Therapist: None, he is interested in establishing with therapy internally.    Chief Complaint    Will identified the reason for seeking services at this time as: \"establishing with psychiatry\"  History of Present Illness     Pt has a history of Bipolar I, and ADHD diagnosis, [facility] and was not tee to follow up due to losing job and insurance. In the last 6 months (since being seen) he did stop lithium and sertrline for about a month and significanlty more depressed and returned to his medications. He feels stable with lithium and Sertraline (Zoloft) and would like to restart ADHD medications now that he is back to work.   C/O weight gin after losing job and has been working with PCP to get restarting on wegovy, he does not think lithium is directly related to his weight gain.    Per previous evaluation 22 Previous diagnoses include depression, schizophrenia in 2016 when starting college, and ADHD in 2017. He was initially diagnosed with schizophrenia and placed on antipsychotics for two years. In 2018 diagnosis changed from schizophrenia to bipolar disorder, and he was put on lamotrigine. Lamotrigine was ineffective, and he was transitioned to lithium at the end of 2018. Mood has been stable, with no manic episodes since starting lithium. Mood symptoms have been well managed at subtherapeutic lithium levels. He recalls he grew up in a family " "where \"mental illness wasn't real\" but struggled with depression starting in 1st or 2nd grade, \"regularly had teachers pull me aside and ask if everything was OK at home.\" He reports a history of chronic suicidal ideation since 3rd grade, particularly when depressed. He has never \"actually attempted it.\" He has also had intrusive thoughts \"all the time\" about hurting or killing himself, despite not wanting to self-harm or do it. Episodes began to get worse and worse, and he \"played the 'things are going to get better when I get to the next stage of life' game.\" He first started medications at the beginning of college, when he had his first manic episode, likely triggered by many factors, including lack of sleep and stress. He has not had a manic episode since he started lithium in 2018/2019. In college, he got care through the university [facility]. His psychiatrist advised him to immediately go off all his mood stabilizers, which made him concerned, and he wanted a second opinion. After transferring care to [facility], he subsequently self-discontinued venlafaxine due to affective flattening.     Recent Psych Symptoms:   Depression:   Felt depressed about 5 months ago after lost his job and when he stopped   Elevated:  increased activity and inflated self esteem, reduced need for sleep (1-1.5 hrs)  Last manic episode in 2018   Psychosis:  Hx of Auditory hallucinations, denies any currently   Anxiety:   feels anxiety stems from other his problems he has. Notes some social anxiety, but is manageable.   Other:  ADHD:  Feels like he has to work \"too much\", throws off sleep schedule and can't keep up with tasks.    Sleep: No issues falling or staying asleep. Difficulty keeping consistent schedule. He uses CPAP machine, light sleep apnea  Appetite: binge eating w/o purging, history of swimming in high school. Reports difficulty with coping.   Suicidal Ideation: Denies  Medication side effects: Sexual dysfunction  Medication " adherence: Reports good med adherence.    Pertinent Social Hx:  FINANCIAL SUPPORT: working  LIVING SITUATION / RELATIONSHIPS: Girlfriend/partner   SOCIAL/ SPIRITUAL SUPPORT: Yes    PROMIS-10 Total Score w/o Sub Scores PROMIS TOTAL - SUBSCORES   9/5/2022   8:09 AM 27   12/8/2022   7:17 AM 25   3/6/2024   2:00 PM 27          No data to display              PHQ PHQ-9 Total Score Q9: Thoughts of better off dead/self-harm past 2 weeks F/U: Thoughts of suicide or self-harm F/U: Self harm-plan F/U: Self-harm action F/U: Safety concerns   6/18/2021   2:53 PM 15 More than half the days Yes No No No   1/3/2022   3:09 PM 6 Not at all       3/6/2024   2:00 PM 5 Not at all         AMAURY-7 SCORE Total Score   3/6/2024   2:00 PM 10        Substance Use History  Past Use: Numerous drugs in college, former cannabis use. LSD, ketamine, cannabis, DMT, psylocybin, research chemicals. No opioids or benzodiazepines   Treatment [#, most recent]- None  Medical Consequences [withdrawal, sz etc]- None  Legal Consequences- None    Pertinent Substance use  No current use of substance use, denies any current use.   Alcohol: None  Nicotine: None  Caffeine: 600-800mg caffeine a day   Opioids: None  Narcan Kit: N/A  THC/CBD: None   Other Illicit Drugs: none  Review of Systems   Review of Systems   HENT: Negative.     Eyes:         Wears glasses   Respiratory: Negative.     Cardiovascular: Negative.    Gastrointestinal:  Positive for constipation and diarrhea.   Genitourinary: Negative.    Musculoskeletal: Negative.    Skin:         Mole, being seen by dermatology   Neurological:  Negative for dizziness, tremors, loss of consciousness and headaches.    Tics, tremors, restlessness:  denies    Past Psychiatric History          Psych hosp: No  Self injurious behaviors: Denies  Suicidal attempts: NO  Suicidal ideation: None   Psychiatry providers: [facility] 2017-6 months ago      History of Violence/Aggression/HI: No   Psychosis hx:  "yes, see   Trauma hx: None  Outpatient programs: None   Therapy: Last attended in 2022, did attend therapy in college    Psychotropic medications at evaluation 3/6/2024   Lithium ER 300mg   Zoloft (sertraline) 50mg once a day     Past Psychotropic Medication Trials  Vyvanse (lisdexamphetamine) 30mg - expensive  Adderall (amphetamine/dextroamphetamine) ER -  Dexedrine - not well tolerated, \"crazy\"  Ritalin - not as effective as adderall   Guanfacine ER 1mg at bedtime - stopped on own didn't think it was helpful   Concerta -   Straterra -   Fluoxetine (Prozac) - was on it for 3 weeks then stopped (would be willing to retry it)  lamotrigine       Lack of efficacy   lithium 900 current Y Sheridan \"on edge\" at higher doses (900), severe lethargy and memory impairment at doses above 600 mg. Despite side effects works better than other mood stabilizer trials.   bupropion       Maria M, while on lithium   escitalopram       Maria M, while on lithium, OCD   atomoxetine     N Hot flashes, nausea. 1 month trial   Adderall    5578-7025 Y \"I hated it\" moderate efficacy   lisdexamfetamine     N Lack of efficacy   dexadrine       Felt like a zombie, dissociated    lurasidone       Somnolence, cognitive slowing   methylphenidate 54 current Y Wears off quick, emotional blunting and low libido.    venlafaxine  150  4001-0501 Y  initially helpful, discontinued due to affect of flattening and emotional blunting. Sexual side effect.   cariprazine  1.5  2021 to current Y     risperidone       Cognitive slowing   aripirazole       Tremors, cognitive slowing   ziprazidone       Cognitive slowing   olanzapine       Cognitive slowing   cariprazine 1.5 2021-current Y Weight gain        MNPMP  reviewed - no record of controlled substances prescribed   PDMP Review       None           Social History                [per patient report]  Early History/Education- Born in [facility] [place]. Moved often in childhood in [place], [place], [place], [place], [place]. Grew up poor on food " stamps during childhood. Did well academically until high school, not many friends until high school. Oldest of 3 brothers. Didn't finish college d/t COVID and related job loss. Dual majored in informatics and math, minors in bio and computer science. 1 semester away from graduation. Lived on own since getting job.  Employment:   Currently employed   Living situation:   With girlfriend and two cats  Feels safe at home: Yes  Children:  0  Social/spiritual support:    Education:  4 years of college  Siblings:   2 younger brothers     Family History   Family History   Problem Relation Age of Onset    Bipolar Disorder Mother     Hyperlipidemia Father     Diabetes Type 1 Father         Late onset    Bipolar Disorder Maternal Grandfather     Diabetes Type 2  Maternal Grandfather     Breast Cancer Paternal Grandmother     Coronary Stenting Paternal Grandmother         x9    Diabetes Type 2  Paternal Grandmother     Suicide Cousin      Mother has bipolar and anxiety   Youngest brother has Anxiety   Father, paternal grandfather with alcoholism.  Maternal grandfather and mother have bipolar disorder.   Middle brother with ADHD and autism  Parkinson's and Alzheimer's on both sides of family. Nothing diagnosed in the immediate family.     Past Medical History   Medication allergies:  No Known Allergies  Neurologic Hx [head injury, seizures, etc.]: None  Patient Active Problem List   Diagnosis    Bipolar I disorder (H)     Past Medical History:   Diagnosis Date    Elbow fracture, right      Medications   Current Outpatient Medications   Medication Sig Dispense Refill    lithium ER (LITHOBID) 300 MG CR tablet Take 1 tablet (300 mg) by mouth 2 times daily 180 tablet 0    sertraline (ZOLOFT) 50 MG tablet Take 1 tablet (50 mg) by mouth daily 90 tablet 0    guanFACINE (INTUNIV) 1 MG TB24 24 hr tablet Take 1 tablet (1 mg) by mouth At Bedtime * PLEASE SCHEDULE APPT. FOR REFILLS 30 tablet 0    lisdexamfetamine (VYVANSE) 30 MG capsule  Take 1 capsule (30 mg) by mouth every morning 30 capsule 0    Semaglutide-Weight Management (WEGOVY) 0.25 MG/0.5ML pen Inject 0.25 mg Subcutaneous once a week (Patient not taking: Reported on 3/6/2024) 2 mL 0     Physical Exam   /84 (BP Location: Right arm, Patient Position: Sitting, Cuff Size: Adult Regular)   Pulse 88   Ht 1.829 m (6')   Wt 110.3 kg (243 lb 1.3 oz)   SpO2 95%   BMI 32.97 kg/m      Pulse Readings from Last 5 Encounters:   03/06/24 88   12/11/23 88   03/07/23 83   02/02/23 97   01/12/23 96    BP Readings from Last 5 Encounters:   03/06/24 139/84   12/11/23 130/88   03/07/23 133/89   02/02/23 (!) 135/91   01/12/23 134/89    Wt Readings from Last 5 Encounters:   03/06/24 110.3 kg (243 lb 1.3 oz)   12/11/23 111.6 kg (246 lb)   03/07/23 108 kg (238 lb 3.2 oz)   02/02/23 108.3 kg (238 lb 12.8 oz)   01/12/23 110.1 kg (242 lb 12.8 oz)        Liver/kidney function Metabolic Blood counts Deficiency Rule out   Recent Labs   Lab Test 12/11/23  0855 11/08/22  0844 06/02/21  0827   CR 1.11 1.25 1.14   AST  --  23 28   ALT  --  54 49   ALKPHOS  --  128 90    Recent Labs   Lab Test 12/11/23  0855   CHOL 213*   TRIG 182*   *   HDL 33*   TSH 1.27    Recent Labs   Lab Test 11/08/22  0844   WBC 6.9   HGB 17.6   HCT 50.8   MCV 81    No lab results found.     No results found for this or any previous visit.        Mental Status Exam  Alertness: alert  and oriented  Appearance: adequately groomed  Behavior/Demeanor: cooperative, pleasant, and calm  Eye Contact: intact  Speech: normal and regular rate and rhythm  Language: intact and no problems  Psychomotor: normal or unremarkable    Mood: description consistent with euthymia  Affect: full range and appropriate; was congruent to mood  Thought Process/Associations: unremarkable  Thought Content:  Reports none;  Denies suicidal ideation, violent ideation, and delusions   Perception:  Reports none;  Denies auditory hallucinations and visual hallucinations  Does not appear to be responding to internal stimuli.    Insight: good  Judgment: intact  Memory: Grossly intact as assessed by interview. Not formally assessed  Fund of knowledge: Average  Cognition: does  appear grossly intact; formal cognitive testing was not done  Gait and Station:  Unable to assess via video and No concerns identified by report  Assessment & Plan   DSM   Bipolar I disorder (H)  Eating disorder, unspecified type (history)  Attention deficit hyperactivity disorder (ADHD), predominantly inattentive type  Long term use of drug     Assessment  Safety:  Low risk for harm towards self or others. Safety plan reviewed as indicated. Local community safety resources in S for patient to use and reviewed with pt on an as needed basis.       MDM: [facility] with history of bipolar I, depression and ADHD. Generally stable and looking to establish care. Referring to Long term psychiatry after [facility] model of care. Will see pt until he is able to be established. Pt is interested in restarting stimulants after he had to stop when he lost job and insurance. Prognosis: good.    Medication discussion: Restarting adderall XR to target ADHD symptoms.     Education: Recommended treatment, treatment side effects, risks, benefits, adverse effects and alternatives.  Reviewed recommended treatment, risks, benefits, and alternatives, potential for abuse/dependence, and medication education. Health promotion activities recommended and reviewed today, abstaining from substance use.   STIMULANT THERAPY: Risk for HTN, tachycardia, sudden death (with familial cardiac hx), motor/tic, appetite/growth, mood lability and sleep disruption. Black box, risk for abuse, do not share, do not loose, keep locked from others, cannot replace lost scripts.. All questions addressed. Assent for medication/treatment was provided by patient/guardian today. Contact clinic/provider for any problems    Plan  RTC/Next Due: 2  months  Disposition: Patient Status: The patient is being referred to LONG TERM PSYCHIATRY care and provider will bridge care until patient is established with new provider.   Medications  START Adderall (amphetamine/dextroamphetamine) XR 20mg once a day   Lithium ER 300mg cap, take 2 caps at bedtime  Sertraline (zoloft) 50mg once a day   Therapy: Referred  Medical care: Continue all other treatments (including medications) per primary care provider and/or specialists, follow up with primary care provider as planned or for acute medical concerns.  Labs/EKG/Orders:  Lithium and UA, Calcium  Referrals: Individual therapy referral placed        PROVIDER: ISACC Hurley CNP              ADMINISTRATIVE BILLING  65 minutes were spent performing chart review, patient assessment, documentation, and case management on the date of service.    Video/Phone Start Time: 1431   Video/Phone End Time: 1528   EPISODE OF CARE [x]  Disclaimer: This note consists of symbols derived from keyboarding, dictation and/or voice recognition software. As a result, there may be errors in the script that have gone undetected. Please consider this when interpreting information found in this chart.

## 2024-03-06 NOTE — PATIENT INSTRUCTIONS
Hi Will,    Thank you for our time together today in Collaborative Care Psychiatry Service (CCPS). CCPS provides brief psychiatric medication stabilization to patients referred by their Primary Care Providers. Patients are typically seen in CCPS for a few appointments and then referred back to their PCP for ongoing refills unless longer term medication management by a specialist is indicated. If I believe you will benefit from long-term psychiatric care I will discuss this with you. If you are interested in seeing a psychiatrist or psychiatric nurse practitioner long-term, please send me a message in Timetric so we can refer you appropriately.     TREATMENT PLAN TODAY   Follow up in 4 weeks (or sooner as needed)  Medication changes   START Adderall (amphetamine/dextroamphetamine) XR 20mg once a day   Lithium ER 300mg cap, take 2 caps at bedtime  Sertraline (zoloft) 50mg once a day   Therapy - [facility] https://[facility]/medication-management/?utm_source=[employer]_ads&utm_medium=beaconmm_cpc&utm_campaign=medication_management_ad_group&utm_term=medication_management&gad_source=1&pxhcv=[email or web address]  Communication  Call the psychiatric nurse line with medication questions or concerns at [phone] or [phone].  Timetric may be used to communicate with your care team, but this is not intended to be used for emergencies.  You can call the above number to make appointments, leave a message with our nursing team, and inquire about any mental health referrals I have placed.  Please call your pharmacy to request a refill of your medications listed above if needed between appointments.   Safety Plan - see below for crisis resources   Call or text [phone] for mental health crisis.   Call 911 or use ER for potentially life-threatening situations.          RESOURCES     Crisis Resources  For emergency help, please call 911 or go to the nearest Emergency  Department.     Emergency Walk-In Options:   EmPATH Unit @ [facility] ([place]): [phone] - Specialized mental health emergency area designed to be calming  [facility] ([place]): [phone]  [facility] ([place]): [phone]  [facility] ([place]): [phone]    [facility] Crisis Information:   [place]: [phone]  [place]: [phone]  [place] (COPE) - Adult: [phone]     Child: [phone]  [place] - Adult: [phone]     Child: [phone]  [place]: [phone]  List of all [facility] resources:   https://[place].gov/dhs/people-we-serve/adults/health-care/mental-health/resources/crisis-contacts.jsp    National Crisis Information:   National Suicide & Crisis Lifeline: Call 988   For online chat options, visit https://suicidepreventionlifeline.org/chat/  Poison Control Center: 0-204-575-3089  Poison Control Center: 2-079-368-6448  Trans Lifeline: 1-267.648.2333 - Hotline for transgender people of all ages  The [place] Project: 1-531.289.9426 - Hotline for LGBT youth     For Non-Emergency Support:   Fast Tracker: Mental Health & Substance Use Disorder Resources -   https://www.fasttrackermn.org/    Additional Resources  Financial Assistance [phone]  [facility] Billing [phone]  Central Billing Office, [facility]: [phone]  [facility] Billing [phone]  Medical Records [phone]  [facility] Patient Bill of Rights [email or web address]        Again thank you for choosing [facility]  [address]  [address]  [place] [address]  Phone: [phone]  Fax: [phone] and please let us know how we can best partner with you to improve you and your family's health.    You may be receiving a survey regarding this appointment. We would love to have your feedback, both positive and negative. The survey is done by an  "external company, so your answers are anonymous. Patient Education   Collaborative Care Psychiatry Service  What to Expect  Here's what to expect from your Collaborative Care Psychiatry Service (CCPS).   About CCPS  CCPS means 2 people work together to help you get better. You'll meet with a behavioral health clinician and a psychiatric doctor. A behavioral health clinician helps people with mental health problems by talking with them. A psychiatric doctor helps people by giving them medicine.  How it works  At every visit, you'll see the behavioral health clinician (BHC) first. They'll talk with you about how you're doing and teach you how to feel better.   Then you'll see the psychiatric doctor. This doctor can help you deal with troubling thoughts and feelings by giving you medicine. They'll make sure you know the plan for your care.   CCPS usually takes 3 to 6 visits. If you need more visits, we may have you start seeing a different psychiatric doctor for ongoing care.  If you have any questions or concerns, we'll be glad to talk with you.  About visits  Be open  At your visits, please talk openly about your problems. It may feel hard, but it's the best way for us to help you.  Cancelling visits  If you can't come to your visit, please call us right away at [phone]. If you don't cancel at least 24 hours (1 full day) before your visit, that's \"late cancellation.\"  Being late to visits  Being very late is the same as not showing up. You will be a \"no show\" if:  Your appointment starts with a BHC, and you're more than 15 minutes late for a 30-minute (half hour) visit. This will also cancel your appointment with the psychiatric doctor.  Your appointment is with a psychiatric doctor only, and you're more than 15 minutes late for a 30-minute (half hour) visit.  Your appointment is with a psychiatric doctor only, and you're more than 30 minutes late for a 60-minute (full hour) visit.  If you cancel late or don't " show up 2 times within 6 months, we may end your care.   Getting help between visits  If you need help between visits, you can call us Monday to Friday from 8 a.m. to 4:30 p.m. at [phone].  Emergency care  Call 911 or go to the nearest emergency department if your life or someone else's life is in danger.  Call 988 anytime to reach the national Suicide and Crisis hotline.  Medicine refills  To refill your medicine, call your pharmacy. You can also call [facility]'s Behavioral Access at [phone], Monday to Friday, 8 a.m. to 4:30 p.m. It can take 1 to 3 business days to get a refill.   Forms, letters, and tests  You may have papers to fill out, like FMLA, short-term disability, and workability. We can help you with these forms at your visits, but you must have an appointment. You may need more than 1 visit for this, to be in an intensive therapy program, or both.  Before we can give you medicine for ADHD, we may refer you to get tested for it or confirm it another way.  We may not be able to give you an emotional support animal letter.  We don't do mental health checks ordered by the court.   We don't do mental health testing, but we can refer you to get tested.   Thank you for choosing us for your care.  For informational purposes only. Not to replace the advice of your health care provider. Copyright   2022 Health system. All rights reserved. [employer] [id number] - 12/22.

## 2024-03-06 NOTE — PROGRESS NOTES
Mental Health and Collaborative Care Psychiatry Service Rooming Note      Most pressing mental health concern at this time: addressing anxiety and ADHD      Any new physical health conditions or diagnoses affecting you that we should be aware of: no      Side effects related to medications patient would like to discuss with the provider:  No      Are you taking your medications as prescribed?  yes  If not, why? N/a      Do you need refills of any of the medications?  No was filled in September while in [place]   If so, which ones? N/A      Are you taking any recreational substances? no      Care team has reviewed attendance agreement with patient. Patient advised that two failed appointments within 6 months may lead to termination of current episode of care.             Lizeth Clayton, MICHELLE  March 6, 2024  1:57 PM

## 2024-03-07 ENCOUNTER — OFFICE VISIT (OUTPATIENT)
Dept: DERMATOLOGY | Facility: CLINIC | Age: 29
End: 2024-03-07
Payer: COMMERCIAL

## 2024-03-07 ENCOUNTER — LAB (OUTPATIENT)
Dept: LAB | Facility: CLINIC | Age: 29
End: 2024-03-07
Payer: COMMERCIAL

## 2024-03-07 DIAGNOSIS — Z79.899 LONG TERM USE OF DRUG: ICD-10-CM

## 2024-03-07 DIAGNOSIS — F31.70 BIPOLAR I DISORDER IN REMISSION (H): ICD-10-CM

## 2024-03-07 DIAGNOSIS — B07.9 VERRUCA VULGARIS: Primary | ICD-10-CM

## 2024-03-07 LAB
ALBUMIN UR-MCNC: NEGATIVE MG/DL
APPEARANCE UR: CLEAR
BILIRUB UR QL STRIP: NEGATIVE
CALCIUM SERPL-MCNC: 9.9 MG/DL (ref 8.6–10)
COLOR UR AUTO: NORMAL
GLUCOSE UR STRIP-MCNC: NEGATIVE MG/DL
HGB UR QL STRIP: NEGATIVE
KETONES UR STRIP-MCNC: NEGATIVE MG/DL
LEUKOCYTE ESTERASE UR QL STRIP: NEGATIVE
LITHIUM SERPL-SCNC: 0.35 MMOL/L (ref 0.6–1.2)
NITRATE UR QL: NEGATIVE
PH UR STRIP: 5.5 [PH] (ref 5–7)
SP GR UR STRIP: 1.02 (ref 1–1.03)
UROBILINOGEN UR STRIP-MCNC: NORMAL MG/DL

## 2024-03-07 PROCEDURE — 81003 URINALYSIS AUTO W/O SCOPE: CPT | Performed by: PATHOLOGY

## 2024-03-07 PROCEDURE — 36415 COLL VENOUS BLD VENIPUNCTURE: CPT | Performed by: PATHOLOGY

## 2024-03-07 PROCEDURE — 99000 SPECIMEN HANDLING OFFICE-LAB: CPT | Performed by: PATHOLOGY

## 2024-03-07 PROCEDURE — 80178 ASSAY OF LITHIUM: CPT | Performed by: NURSE PRACTITIONER

## 2024-03-07 PROCEDURE — 82310 ASSAY OF CALCIUM: CPT | Performed by: PATHOLOGY

## 2024-03-07 PROCEDURE — 67850 DSTRJ LESION LID MARGIN <1CM: CPT | Mod: RT | Performed by: STUDENT IN AN ORGANIZED HEALTH CARE EDUCATION/TRAINING PROGRAM

## 2024-03-07 ASSESSMENT — PAIN SCALES - GENERAL: PAINLEVEL: NO PAIN (0)

## 2024-03-07 NOTE — NURSING NOTE
Dermatology Rooming Note    Lux Bailey's goals for this visit include:   Chief Complaint   Patient presents with    Derm Problem     Mole on R eye, near waterline     Janice Mc, EMT

## 2024-03-07 NOTE — LETTER
3/7/2024       RE: Lux Bailey  [address] [facility] [address]     Dear Colleague,    Thank you for referring your patient, Lux Bailey, to the [facility] at [facility]. Please see a copy of my visit note below.    I have personally examined this patient and agree with the medical student's documentation and plan of care. I have reviewed and amended the medical student's note as necessary. I personally performed all procedure(s).The documentation accurately reflects my clinical observations, diagnoses, treatment and follow-up plans.     Jaquan Storey M.D.   Dermatology Staff       [facility] Dermatology Note  Encounter Date: Mar 7, 2024  Office Visit     Dermatology Problem List:  1. Squamous papilloma, anterior lid margin of right eye   - S/p electrodesiccation on 3/7/24  ____________________________________________    Assessment & Plan:    # Squamous papilloma, anterior lid margin of right eye  - S/p electrodesiccation on 3/7/24   - Discussed with patient that there is a chance a small portion of the lesion may remain despite treatment today and that any remaining portion can be retreated if the patient desires      Procedures Performed:   - Electrodesiccation: After verbal consent and discussion of risks and benefits including, but not limited to pain, bleeding, blister, infection, dyspigmentation, scar, incomplete removal, and/or recurrence, The lesion was cleansed with a 70% isopropyl alcohol wipe and then injected with 2 cc of lidocaine 1% with 1:697089 epinephrine. 1 lesion(s) at the right anterior lid margin was then treated with electrodesiccation. Vaseline and a bandage were applied to the wound sites. Patient tolerated the procedure(s) well and post treatment instructions were provided.    - Procedure(s) performed by faculty.     Follow-up: prn for new or changing  lesions    Staff and Medical Student:     Deepak HUFF, MS3, saw and discussed this patient with the attending physician Dr. Jaquan Storey MD.       ____________________________________________    CC: Derm Problem (Mole on R eye, near waterline)    HPI:  Mr. Lux Bailey is a(n) 28 year old male who presents today as a new patient for a papular lesion on the medial aspect of the right anterior eyelid margin. States this lesion has been present since birth but has changed in appearance and gotten about 30% bigger over the past 3-4 months. Denies any itching, pain, or bleeding associated with lesion. It does not affect his vision.     Patient is otherwise feeling well, without additional skin concerns.    Labs:  None reviewed.    Physical Exam:  Vitals: There were no vitals taken for this visit.  SKIN: Focused examination of right eyelid/lid margin was performed.  - There is a 3 mm light pink papule with multiple papillomatous projections along the medial aspect of the right anterior lid margin    - No other lesions of concern on areas examined.     Medications:  Current Outpatient Medications   Medication    amphetamine-dextroamphetamine (ADDERALL XR) 20 MG 24 hr capsule    lithium ER (LITHOBID) 300 MG CR tablet    sertraline (ZOLOFT) 50 MG tablet    lisdexamfetamine (VYVANSE) 30 MG capsule    Semaglutide-Weight Management (WEGOVY) 0.25 MG/0.5ML pen     No current facility-administered medications for this visit.      Past Medical History:   Patient Active Problem List   Diagnosis    Bipolar I disorder (H)     Past Medical History:   Diagnosis Date    Elbow fracture, right         CC Referred Self, MD  No address on file on close of this encounter.

## 2024-03-07 NOTE — PATIENT INSTRUCTIONS
The patient was advised that the treated areas will be red and slightly swollen prior to crusting over and peeling off in the next 1-2 weeks. Apply Vaseline to treated area daily until healed.    Patient counseled on small chance that a portion of the lesion may remain despite treatment today. If this is the case, can return to clinic for additional treatment if desired.

## 2024-03-07 NOTE — PROGRESS NOTES
I have personally examined this patient and agree with the medical student's documentation and plan of care. I have reviewed and amended the medical student's note as necessary. I personally performed all procedure(s).The documentation accurately reflects my clinical observations, diagnoses, treatment and follow-up plans.     Jaquan Storey M.D.   Dermatology Staff       [facility] Dermatology Note  Encounter Date: Mar 7, 2024  Office Visit     Dermatology Problem List:  1. Squamous papilloma, anterior lid margin of right eye   - S/p electrodesiccation on 3/7/24  ____________________________________________    Assessment & Plan:    # Squamous papilloma, anterior lid margin of right eye  - S/p electrodesiccation on 3/7/24   - Discussed with patient that there is a chance a small portion of the lesion may remain despite treatment today and that any remaining portion can be retreated if the patient desires      Procedures Performed:   - Electrodesiccation: After verbal consent and discussion of risks and benefits including, but not limited to pain, bleeding, blister, infection, dyspigmentation, scar, incomplete removal, and/or recurrence, The lesion was cleansed with a 70% isopropyl alcohol wipe and then injected with 2 cc of lidocaine 1% with 1:955423 epinephrine. 1 lesion(s) at the right anterior lid margin was then treated with electrodesiccation. Vaseline and a bandage were applied to the wound sites. Patient tolerated the procedure(s) well and post treatment instructions were provided.    - Procedure(s) performed by faculty.     Follow-up: prn for new or changing lesions    Staff and Medical Student:     I, Deepak Cabezas, MS3, saw and discussed this patient with the attending physician Dr. Jaquan Storey MD.       ____________________________________________    CC: Derm Problem (Mole on R eye, near waterline)    HPI:  Mr. Lxu Bailey is a(n) 28 year old male who presents today as a new  patient for a papular lesion on the medial aspect of the right anterior eyelid margin. States this lesion has been present since birth but has changed in appearance and gotten about 30% bigger over the past 3-4 months. Denies any itching, pain, or bleeding associated with lesion. It does not affect his vision.     Patient is otherwise feeling well, without additional skin concerns.    Labs:  None reviewed.    Physical Exam:  Vitals: There were no vitals taken for this visit.  SKIN: Focused examination of right eyelid/lid margin was performed.  - There is a 3 mm light pink papule with multiple papillomatous projections along the medial aspect of the right anterior lid margin    - No other lesions of concern on areas examined.     Medications:  Current Outpatient Medications   Medication    amphetamine-dextroamphetamine (ADDERALL XR) 20 MG 24 hr capsule    lithium ER (LITHOBID) 300 MG CR tablet    sertraline (ZOLOFT) 50 MG tablet    lisdexamfetamine (VYVANSE) 30 MG capsule    Semaglutide-Weight Management (WEGOVY) 0.25 MG/0.5ML pen     No current facility-administered medications for this visit.      Past Medical History:   Patient Active Problem List   Diagnosis    Bipolar I disorder (H)     Past Medical History:   Diagnosis Date    Elbow fracture, right         CC Referred Self, MD  No address on file on close of this encounter.

## 2024-03-13 ENCOUNTER — TRANSFERRED RECORDS (OUTPATIENT)
Dept: HEALTH INFORMATION MANAGEMENT | Facility: CLINIC | Age: 29
End: 2024-03-13
Payer: COMMERCIAL

## 2024-04-04 ASSESSMENT — ANXIETY QUESTIONNAIRES
8. IF YOU CHECKED OFF ANY PROBLEMS, HOW DIFFICULT HAVE THESE MADE IT FOR YOU TO DO YOUR WORK, TAKE CARE OF THINGS AT HOME, OR GET ALONG WITH OTHER PEOPLE?: NOT DIFFICULT AT ALL
7. FEELING AFRAID AS IF SOMETHING AWFUL MIGHT HAPPEN: NOT AT ALL
7. FEELING AFRAID AS IF SOMETHING AWFUL MIGHT HAPPEN: NOT AT ALL
2. NOT BEING ABLE TO STOP OR CONTROL WORRYING: NOT AT ALL
6. BECOMING EASILY ANNOYED OR IRRITABLE: SEVERAL DAYS
GAD7 TOTAL SCORE: 4
GAD7 TOTAL SCORE: 4
3. WORRYING TOO MUCH ABOUT DIFFERENT THINGS: SEVERAL DAYS
IF YOU CHECKED OFF ANY PROBLEMS ON THIS QUESTIONNAIRE, HOW DIFFICULT HAVE THESE PROBLEMS MADE IT FOR YOU TO DO YOUR WORK, TAKE CARE OF THINGS AT HOME, OR GET ALONG WITH OTHER PEOPLE: NOT DIFFICULT AT ALL
5. BEING SO RESTLESS THAT IT IS HARD TO SIT STILL: NOT AT ALL
1. FEELING NERVOUS, ANXIOUS, OR ON EDGE: SEVERAL DAYS
GAD7 TOTAL SCORE: 4
4. TROUBLE RELAXING: SEVERAL DAYS

## 2024-04-04 ASSESSMENT — PATIENT HEALTH QUESTIONNAIRE - PHQ9
SUM OF ALL RESPONSES TO PHQ QUESTIONS 1-9: 5
SUM OF ALL RESPONSES TO PHQ QUESTIONS 1-9: 5
10. IF YOU CHECKED OFF ANY PROBLEMS, HOW DIFFICULT HAVE THESE PROBLEMS MADE IT FOR YOU TO DO YOUR WORK, TAKE CARE OF THINGS AT HOME, OR GET ALONG WITH OTHER PEOPLE: NOT DIFFICULT AT ALL

## 2024-04-04 NOTE — PROGRESS NOTES
Mental Health and Collaborative Care Psychiatry Service Rooming Note      Most pressing mental health concern at this time: Recheck  Provider to review labs from 3/7/24    Any new physical health conditions or diagnoses affecting you that we should be aware of: denies      Side effects related to medications patient would like to discuss with the provider:  Yes -dry mouth       Are you taking your medications as prescribed?  yes      Do you need refills of any of the medications?  yes  If so, which ones? Adderall      Are you taking any recreational substances? denies        Add attendance guidelines: pt aware of attendance quidnati Taylor LPN  April 4, 2024  3:33 PM

## 2024-04-05 ENCOUNTER — OFFICE VISIT (OUTPATIENT)
Dept: PSYCHIATRY | Facility: CLINIC | Age: 29
End: 2024-04-05
Payer: COMMERCIAL

## 2024-04-05 VITALS
BODY MASS INDEX: 32.78 KG/M2 | WEIGHT: 242 LBS | HEART RATE: 92 BPM | SYSTOLIC BLOOD PRESSURE: 131 MMHG | HEIGHT: 72 IN | DIASTOLIC BLOOD PRESSURE: 88 MMHG

## 2024-04-05 DIAGNOSIS — F90.0 ATTENTION DEFICIT HYPERACTIVITY DISORDER (ADHD), PREDOMINANTLY INATTENTIVE TYPE: Primary | ICD-10-CM

## 2024-04-05 DIAGNOSIS — F50.9 EATING DISORDER, UNSPECIFIED TYPE: ICD-10-CM

## 2024-04-05 DIAGNOSIS — F31.9 BIPOLAR 1 DISORDER (H): ICD-10-CM

## 2024-04-05 PROCEDURE — 99214 OFFICE O/P EST MOD 30 MIN: CPT | Performed by: NURSE PRACTITIONER

## 2024-04-05 RX ORDER — DEXTROAMPHETAMINE SACCHARATE, AMPHETAMINE ASPARTATE MONOHYDRATE, DEXTROAMPHETAMINE SULFATE AND AMPHETAMINE SULFATE 5; 5; 5; 5 MG/1; MG/1; MG/1; MG/1
20 CAPSULE, EXTENDED RELEASE ORAL DAILY
Qty: 30 CAPSULE | Refills: 0 | Status: SHIPPED | OUTPATIENT
Start: 2024-06-06 | End: 2024-07-06

## 2024-04-05 RX ORDER — LITHIUM CARBONATE 300 MG/1
600 TABLET, FILM COATED, EXTENDED RELEASE ORAL DAILY
Qty: 180 TABLET | Refills: 0 | Status: SHIPPED | OUTPATIENT
Start: 2024-04-05 | End: 2024-07-03

## 2024-04-05 RX ORDER — DEXTROAMPHETAMINE SACCHARATE, AMPHETAMINE ASPARTATE MONOHYDRATE, DEXTROAMPHETAMINE SULFATE AND AMPHETAMINE SULFATE 5; 5; 5; 5 MG/1; MG/1; MG/1; MG/1
20 CAPSULE, EXTENDED RELEASE ORAL DAILY
Qty: 30 CAPSULE | Refills: 0 | Status: SHIPPED | OUTPATIENT
Start: 2024-04-05 | End: 2024-05-05

## 2024-04-05 RX ORDER — DEXTROAMPHETAMINE SACCHARATE, AMPHETAMINE ASPARTATE MONOHYDRATE, DEXTROAMPHETAMINE SULFATE AND AMPHETAMINE SULFATE 5; 5; 5; 5 MG/1; MG/1; MG/1; MG/1
20 CAPSULE, EXTENDED RELEASE ORAL DAILY
Qty: 30 CAPSULE | Refills: 0 | Status: SHIPPED | OUTPATIENT
Start: 2024-05-06 | End: 2024-06-05

## 2024-04-05 ASSESSMENT — PAIN SCALES - GENERAL: PAINLEVEL: NO PAIN (0)

## 2024-04-05 NOTE — PATIENT INSTRUCTIONS
Treatment plan today   Follow up in 3 months (or sooner as needed)  Medications  Adderall (amphetamine/dextroamphetamine) XR 20mg once a day   Lithium ER 300mg cap, take 2 caps at bedtime  Sertraline (zoloft) 50mg once a day   Call [phone] for scheduling long term therapy.   Communication  Call the psychiatric nurse line with medication questions or concerns at [phone] or [phone].  LeanData[employer] may be used to communicate with your care team, but this is not intended to be used for emergencies.  You can call the above number to make appointments, leave a message with our nursing team, and inquire about any mental health referrals I have placed.  Please call your pharmacy to request a refill of your medications listed above if needed between appointments.   Safety Plan - see below for crisis resources   Call or text [phone] for mental health crisis.   Call 911 or use ER for potentially life-threatening situations.     ISACC Aly, CNP, PMHNP  Collaborative Care Psychiatry Service (CCPS)    [facility]         RESOURCES     Crisis Resources  For emergency help, please call 911 or go to the nearest Emergency Department.     Emergency Walk-In Options:   EmPATH Unit @ [facility] ([place]): [phone] - Specialized mental health emergency area designed to be calming  [facility] ([place]): [phone]  [facility] ([place]): [phone]  [facility]): [phone]    County Crisis Information:   [place]: [phone]  [place]: [phone]  [place] (COPE) - Adult: [phone]     Child: [phone]  [place] - Adult: [phone]     Child: [phone]  [place]: [phone]  List of all [facility] resources:   https://[place].gov/dhs/people-we-serve/adults/health-care/mental-health/resources/crisis-contacts.jsp    National Crisis Information:   National Suicide & Crisis Lifeline: Call 895   For online chat options, visit  https://suicidepreventionlifeline.org/chat/  Poison Control Center: 1-132-829-7420  Poison Control Center: 7-299-514-5876  Trans Lifeline: 1-222-948-1882 - Hotline for transgender people of all ages  The [employer] Project: 3-843-374-7510 - Hotline for LGBT youth     For Non-Emergency Support:   Fast Tracker: Mental Health & Substance Use Disorder Resources -   https://www.Neovacs.org/    Additional Resources  Financial Assistance [phone]  [facility] Billing [phone]  Central Billing Office, [facility]: [phone]  [place] Billing [phone]  Medical Records [phone]  [place] Patient Bill of Rights [email or web address]         Patient Education   Collaborative Care Psychiatry Service  What to Expect  Here's what to expect from your Collaborative Care Psychiatry Service (CCPS).   About CCPS  CCPS means 2 people work together to help you get better. You'll meet with a behavioral health clinician and a psychiatric doctor. A behavioral health clinician helps people with mental health problems by talking with them. A psychiatric doctor helps people by giving them medicine.  How it works  At every visit, you'll see the behavioral health clinician (BHC) first. They'll talk with you about how you're doing and teach you how to feel better.   Then you'll see the psychiatric doctor. This doctor can help you deal with troubling thoughts and feelings by giving you medicine. They'll make sure you know the plan for your care.   CCPS usually takes 3 to 6 visits. If you need more visits, we may have you start seeing a different psychiatric doctor for ongoing care.  If you have any questions or concerns, we'll be glad to talk with you.  About visits  Be open  At your visits, please talk openly about your problems. It may feel hard, but it's the best way for us to help you.  Cancelling visits  If you can't come to your visit, please call us right away at  "[phone]. If you don't cancel at least 24 hours (1 full day) before your visit, that's \"late cancellation.\"  Being late to visits  Being very late is the same as not showing up. You will be a \"no show\" if:  Your appointment starts with a [facility], and you're more than 15 minutes late for a 30-minute (half hour) visit. This will also cancel your appointment with the psychiatric doctor.  Your appointment is with a psychiatric doctor only, and you're more than 15 minutes late for a 30-minute (half hour) visit.  Your appointment is with a psychiatric doctor only, and you're more than 30 minutes late for a 60-minute (full hour) visit.  If you cancel late or don't show up 2 times within 6 months, we may end your care.   Getting help between visits  If you need help between visits, you can call us Monday to Friday from 8 a.m. to 4:30 p.m. at [phone].  Emergency care  Call 911 or go to the nearest emergency department if your life or someone else's life is in danger.  Call 988 anytime to reach the national Suicide and Crisis hotline.  Medicine refills  To refill your medicine, call your pharmacy. You can also call [facility]'s Behavioral Access at [phone], Monday to Friday, 8 a.m. to 4:30 p.m. It can take 1 to 3 business days to get a refill.   Forms, letters, and tests  You may have papers to fill out, like FMLA, short-term disability, and workability. We can help you with these forms at your visits, but you must have an appointment. You may need more than 1 visit for this, to be in an intensive therapy program, or both.  Before we can give you medicine for ADHD, we may refer you to get tested for it or confirm it another way.  We may not be able to give you an emotional support animal letter.  We don't do mental health checks ordered by the court.   We don't do mental health testing, but we can refer you to get tested.   Thank you for choosing us for your care.  For informational purposes only. Not " to replace the advice of your health care provider. Copyright   2022 [facility]. All rights reserved. VIPAAR [id number] - 12/22.

## 2024-04-05 NOTE — PROGRESS NOTES
"[facility] [place] [address], [place], [address]  [place] [address]  [phone]   Collaborative Care Psychiatry   Progress Note  Collaborative Care Psychiatry Service (CCPS) short term psychiatric stabilization model of care reviewed with patient/guardian who verbalized understanding.    Name: Lux Bailey   Preferred name: Regino (male)   : 1995 / 28 year old    Initial consultation on 24.      CARE TEAM:   PCP:William Ramos  Therapist: None, he is interested in establishing with therapy internally.    Chief Complaint   CCPS follow up visit  Last seen 3/6/24 at visit restarted adderall XR at 20mg a day     Interval History   Since last seen he reports he is doing \"incredible\"   House is in better order, a lot less guilt  Getting a lot of work done and doing really well.   He feels his dose is at a good dose, maybe 25mg would be more helpful.   Depression feels related   Side effects: Dry mouth   Work is going really well - was promoted   Able to leave his job when he wants  He is trying to take adderall off once a week,   He has cut down on caffeine - 3 cups a day     He will be going [id number] in [place], asked about Concerta and vyvanse which may be okay. He has been on concerta in the past and asked if he could have a small supply of those medications when he is traveling.      Medication side effects:  dry mouth  Medication adherence: Reports good med adherence.    Pertinent Social Hx:  FINANCIAL SUPPORT: working  LIVING SITUATION / RELATIONSHIPS: Girlfriend/partner   SOCIAL/ SPIRITUAL SUPPORT: Yes    PROMIS-10 Total Score w/o Sub Scores PROMIS TOTAL - SUBSCORES   2022   8:09 AM 27   2022   7:17 AM 25   3/6/2024   2:00 PM 27       PHQ PHQ-9 Total Score Q9: Thoughts of better off dead/self-harm past 2 weeks   1/3/2022   3:09 PM 6 Not at all   3/6/2024   2:00 PM 5 Not at all   2024   9:39 " "PM 5 Not at all     AMAURY-7 SCORE Total Score Total Score   3/6/2024   2:00 PM  10   4/4/2024   9:40 PM 4 (minimal anxiety) 4        Substance Use History  Past Use: Numerous drugs in college, former cannabis use. LSD, ketamine, cannabis, DMT, psylocybin, research chemicals. No opioids or benzodiazepines   Treatment [#, most recent]- None  Medical Consequences [withdrawal, sz etc]- None  Legal Consequences- None    Pertinent Substance use  No current use of substance use, denies any current use.   Alcohol: None  Nicotine: None  Caffeine: 600-800mg caffeine a day   Opioids: None  Narcan Kit: N/A  THC/CBD: None   Other Illicit Drugs: none    Psychiatric Summary          Psych hosp: No  Self injurious behaviors: Denies  Suicidal attempts: NO  Suicidal ideation: None   Psychiatry providers: [facility] 2017-6 months ago      History of Violence/Aggression/HI: No   Psychosis hx: yes, see   Trauma hx: None  Outpatient programs: None   Therapy: Last attended in 2022, did attend therapy in college    Psychotropic medications at evaluation 3/6/2024   Lithium ER 300mg   Zoloft (sertraline) 50mg once a day     Past Psychotropic Medication Trials  Vyvanse (lisdexamphetamine) 30mg - expensive  Adderall (amphetamine/dextroamphetamine) ER  Dexedrine - not well tolerated, \"crazy\"  Ritalin - not as effective as adderall   Guanfacine ER 1mg at bedtime - stopped on own didn't think it was helpful   Concerta - helpful, not as well tolerated as adderall   Straterra -   Fluoxetine (Prozac) - was on it for 3 weeks then stopped (would be willing to retry it)  lamotrigine       Lack of efficacy   lithium 900 current Y [place] \"on edge\" at higher doses (900), severe lethargy and memory impairment at doses above 600 mg. Despite side effects works better than other mood stabilizer trials.   bupropion       Maria M, while on lithium   escitalopram       Maria M, while on lithium, OCD   atomoxetine     N Hot flashes, nausea. 1 month trial " "  Adderall    0607-6745 Y \"I hated it\" moderate efficacy   lisdexamfetamine     N Lack of efficacy   dexadrine       Felt like a zombie, dissociated    lurasidone       Somnolence, cognitive slowing   methylphenidate 54 current Y Wears off quick, emotional blunting and low libido.    venlafaxine  150  8915-5303 Y  initially helpful, discontinued due to affect of flattening and emotional blunting. Sexual side effect.   cariprazine  1.5  2021 to current Y     risperidone       Cognitive slowing   aripirazole       Tremors, cognitive slowing   ziprazidone       Cognitive slowing   olanzapine       Cognitive slowing   cariprazine 1.5 2021-current Y Weight gain        MNPMP  reviewed - no record of controlled substances prescribed   PDMP Review       None          Family History   Family History   Problem Relation Age of Onset    Bipolar Disorder Mother     Hyperlipidemia Father     Diabetes Type 1 Father         Late onset    Skin Cancer Maternal Grandfather     Bipolar Disorder Maternal Grandfather     Diabetes Type 2  Maternal Grandfather     Breast Cancer Paternal Grandmother     Coronary Stenting Paternal Grandmother         x9    Diabetes Type 2  Paternal Grandmother     Suicide Cousin      Mother has bipolar and anxiety   Youngest brother has Anxiety   Father, paternal grandfather with alcoholism.  Maternal grandfather and mother have bipolar disorder.   Middle brother with ADHD and autism  Parkinson's and Alzheimer's on both sides of family. Nothing diagnosed in the immediate family.     Past Medical History   Medication allergies:  No Known Allergies  Neurologic Hx [head injury, seizures, etc.]: None  Patient Active Problem List   Diagnosis    Bipolar I disorder (H)     Past Medical History:   Diagnosis Date    Elbow fracture, right      Medications   Current Outpatient Medications   Medication Sig Dispense Refill    amphetamine-dextroamphetamine (ADDERALL XR) 20 MG 24 hr capsule Take 1 capsule (20 mg) by " mouth daily for 30 days 30 capsule 0    lithium ER (LITHOBID) 300 MG CR tablet Take 2 tablets (600 mg) by mouth daily 180 tablet 0    sertraline (ZOLOFT) 50 MG tablet Take 1 tablet (50 mg) by mouth daily 90 tablet 0    Semaglutide-Weight Management (WEGOVY) 0.25 MG/0.5ML pen Inject 0.25 mg Subcutaneous once a week (Patient not taking: Reported on 3/6/2024) 2 mL 0     Physical Exam   /88 (BP Location: Right arm, Patient Position: Sitting, Cuff Size: Adult Large)   Pulse 92   Ht 1.829 m (6')   Wt 109.8 kg (242 lb)   BMI 32.82 kg/m      Pulse Readings from Last 5 Encounters:   04/05/24 92   03/06/24 88   12/11/23 88   03/07/23 83   02/02/23 97    BP Readings from Last 5 Encounters:   04/05/24 131/88   03/06/24 139/84   12/11/23 130/88   03/07/23 133/89   02/02/23 (!) 135/91    Wt Readings from Last 5 Encounters:   04/05/24 109.8 kg (242 lb)   03/06/24 110.3 kg (243 lb 1.3 oz)   12/11/23 111.6 kg (246 lb)   03/07/23 108 kg (238 lb 3.2 oz)   02/02/23 108.3 kg (238 lb 12.8 oz)        Liver/kidney function Metabolic Blood counts Deficiency Rule out   Recent Labs   Lab Test 12/11/23  0855 11/08/22  0844 06/02/21  0827   CR 1.11 1.25 1.14   AST  --  23 28   ALT  --  54 49   ALKPHOS  --  128 90    Recent Labs   Lab Test 12/11/23  0855   CHOL 213*   TRIG 182*   *   HDL 33*   TSH 1.27    Recent Labs   Lab Test 11/08/22  0844   WBC 6.9   HGB 17.6   HCT 50.8   MCV 81    No lab results found.     No results found for this or any previous visit.    Mental Status Exam  Alertness: alert  and oriented  Appearance: adequately groomed  Behavior/Demeanor: cooperative, pleasant, and calm  Eye Contact: intact  Speech: normal and regular rate and rhythm  Language: intact and no problems  Psychomotor: normal or unremarkable    Mood: description consistent with euthymia  Affect: full range and appropriate; was congruent to mood  Thought Process/Associations: unremarkable  Thought Content:  Reports none;  Denies suicidal  ideation, violent ideation, and delusions   Perception:  Reports none;  Denies auditory hallucinations and visual hallucinations Does not appear to be responding to internal stimuli.    Insight: good  Judgment: intact  Memory: Grossly intact as assessed by interview. Not formally assessed  Fund of knowledge: Average  Cognition: does  appear grossly intact; formal cognitive testing was not done  Gait and Station:  Unable to assess via video and No concerns identified by report  Assessment & Plan   DSM   Bipolar I disorder (H)  Eating disorder, unspecified type (history)  Attention deficit hyperactivity disorder (ADHD), predominantly inattentive type  Long term use of drug     Assessment  Safety:  Low risk for harm towards self or others. Safety plan reviewed as indicated. Local community safety resources in AVS for patient to use and reviewed with pt on an as needed basis.     MDM: Will presents to [facility] with history of bipolar I, depression and ADHD. Generally stable and looking to establish care. Referring to Long term psychiatry after reviewing CCPS model of care. Will see pt until he is able to be established. Pt is interested in restarting stimulants after he had to stop when he lost job and insurance. Prognosis: good.  4/5/24: Labs completed at 2:44in the afternoon, not at therapeutic lithium level and historically has remained stable on subtherapeutic level. No changes needed today. Continue addrall XR, reviewed following up with therapy and LT psych.Try xylitol lozenges and mouth wash for dry mouth.     Education: Recommended treatment, treatment side effects, risks, benefits, adverse effects and alternatives.  Reviewed recommended treatment, risks, benefits, and alternatives, potential for abuse/dependence, and medication education. Health promotion activities recommended and reviewed today, abstaining from substance use.   STIMULANT THERAPY: Risk for HTN, tachycardia, sudden death (with familial cardiac hx),  motor/tic, appetite/growth, mood lability and sleep disruption. Black box, risk for abuse, do not share, do not loose, keep locked from others, cannot replace lost scripts.. All questions addressed. Assent for medication/treatment was provided by patient/guardian today. Contact clinic/provider for any problems    Plan  RTC/Next Due: 3 months  Disposition: Patient Status: The patient is being referred to LONG TERM PSYCHIATRY care and provider will bridge care until patient is established with new provider.   Medications - 3 month supply snet  Adderall (amphetamine/dextroamphetamine) XR 20mg once a day   Lithium ER 300mg cap, take 2 caps at bedtime  Sertraline (zoloft) 50mg once a day   Therapy: Referred  Medical care: Continue all other treatments (including medications) per primary care provider and/or specialists, follow up with primary care provider as planned or for acute medical concerns.  Labs/EKG/Orders: Completed   Referrals: Individual therapy referral placed    PROVIDER: ISACC Hurley CNP              ADMINISTRATIVE BILLING  Level of Medical Decision Making:   - At least 1 chronic problem that is not stable  - Engaged in prescription drug management during visit (discussed any medication benefits, side effects, alternatives, etc.)         EPISODE OF CARE [x]  Disclaimer: This note consists of symbols derived from keyboarding, dictation and/or voice recognition software. As a result, there may be errors in the script that have gone undetected. Please consider this when interpreting information found in this chart.

## 2024-06-11 ENCOUNTER — MYC MEDICAL ADVICE (OUTPATIENT)
Dept: FAMILY MEDICINE | Facility: CLINIC | Age: 29
End: 2024-06-11
Payer: COMMERCIAL

## 2024-07-03 ENCOUNTER — OFFICE VISIT (OUTPATIENT)
Dept: PSYCHIATRY | Facility: CLINIC | Age: 29
End: 2024-07-03
Payer: COMMERCIAL

## 2024-07-03 VITALS
HEART RATE: 74 BPM | WEIGHT: 234.08 LBS | HEIGHT: 72 IN | BODY MASS INDEX: 31.71 KG/M2 | SYSTOLIC BLOOD PRESSURE: 124 MMHG | DIASTOLIC BLOOD PRESSURE: 85 MMHG | OXYGEN SATURATION: 96 %

## 2024-07-03 DIAGNOSIS — F90.0 ATTENTION DEFICIT HYPERACTIVITY DISORDER (ADHD), PREDOMINANTLY INATTENTIVE TYPE: Primary | ICD-10-CM

## 2024-07-03 DIAGNOSIS — F41.9 ANXIETY: ICD-10-CM

## 2024-07-03 DIAGNOSIS — F31.70 BIPOLAR I DISORDER IN REMISSION (H): ICD-10-CM

## 2024-07-03 DIAGNOSIS — F50.9 EATING DISORDER, UNSPECIFIED TYPE: ICD-10-CM

## 2024-07-03 PROCEDURE — 99214 OFFICE O/P EST MOD 30 MIN: CPT | Performed by: NURSE PRACTITIONER

## 2024-07-03 RX ORDER — DEXTROAMPHETAMINE SACCHARATE, AMPHETAMINE ASPARTATE MONOHYDRATE, DEXTROAMPHETAMINE SULFATE AND AMPHETAMINE SULFATE 5; 5; 5; 5 MG/1; MG/1; MG/1; MG/1
20 CAPSULE, EXTENDED RELEASE ORAL DAILY
Qty: 30 CAPSULE | Refills: 0 | Status: SHIPPED | OUTPATIENT
Start: 2024-09-01 | End: 2024-09-10

## 2024-07-03 RX ORDER — DEXTROAMPHETAMINE SACCHARATE, AMPHETAMINE ASPARTATE MONOHYDRATE, DEXTROAMPHETAMINE SULFATE AND AMPHETAMINE SULFATE 5; 5; 5; 5 MG/1; MG/1; MG/1; MG/1
20 CAPSULE, EXTENDED RELEASE ORAL DAILY
Qty: 30 CAPSULE | Refills: 0 | Status: SHIPPED | OUTPATIENT
Start: 2024-07-03 | End: 2024-08-02

## 2024-07-03 RX ORDER — LITHIUM CARBONATE 300 MG/1
600 TABLET, FILM COATED, EXTENDED RELEASE ORAL DAILY
Qty: 180 TABLET | Refills: 0 | Status: SHIPPED | OUTPATIENT
Start: 2024-07-03

## 2024-07-03 RX ORDER — DEXTROAMPHETAMINE SACCHARATE, AMPHETAMINE ASPARTATE MONOHYDRATE, DEXTROAMPHETAMINE SULFATE AND AMPHETAMINE SULFATE 5; 5; 5; 5 MG/1; MG/1; MG/1; MG/1
20 CAPSULE, EXTENDED RELEASE ORAL DAILY
Qty: 30 CAPSULE | Refills: 0 | Status: SHIPPED | OUTPATIENT
Start: 2024-08-02 | End: 2024-09-01

## 2024-07-03 RX ORDER — LITHIUM CARBONATE 300 MG/1
600 TABLET, FILM COATED, EXTENDED RELEASE ORAL DAILY
Qty: 180 TABLET | Refills: 0 | Status: SHIPPED | OUTPATIENT
Start: 2024-07-03 | End: 2024-07-03

## 2024-07-03 ASSESSMENT — PATIENT HEALTH QUESTIONNAIRE - PHQ9: SUM OF ALL RESPONSES TO PHQ QUESTIONS 1-9: 5

## 2024-07-03 NOTE — PROGRESS NOTES
[facility] [place] [address], [place], [address]  [place] [address]  [phone]   Collaborative Care Psychiatry   Progress Note  Collaborative Care Psychiatry Service (CCPS) short term psychiatric stabilization model of care reviewed with patient/guardian who verbalized understanding.    Name: Lux Bailey   Preferred name: Regino (male)   : 1995 / 28 year old    Initial consultation on 24.      CARE TEAM:   PCP:William Ramos  Therapist: None, he is interested in establishing with therapy internally.    Chief Complaint   [facility]PS follow up visit - last seen 24 at last visit continued Adderall (amphetamine/dextroamphetamine) XR 20mg a day,  Lithium ER 300mg cap, take 2 caps at bedtime and Sertraline (zoloft) 50mg once a day     Interval History   Patient reports he has been doing well since last seen   Work is going well  Mood has been good  Anxiety has been manageable   No marline, no AH/VH, No SI  Tried to start wegovy - ine  past year has gained a lot more weight.   Metformin was not tolerated by Dad so he does not want to try it,  Adderall (amphetamine/dextroamphetamine) xr has been helpful, no complaints  Still taking 1 day off a week from adderall XR  Caffeine - 1-2 cups a day   Involved with friends.     No longer going to [place] and does not need accommodations with medications for traveling.     Medication side effects:  dry mouth  - has not tried xylitol yet.   Medication adherence: Reports good med adherence.    Pertinent Social Hx:  FINANCIAL SUPPORT: working  LIVING SITUATION / RELATIONSHIPS: Girlfriend/partner   SOCIAL/ SPIRITUAL SUPPORT: Yes    PROMIS-10 Total Score w/o Sub Scores PROMIS TOTAL - SUBSCORES   2022   7:17 AM 25   3/6/2024   2:00 PM 27   2024   9:54 PM 26       PHQ PHQ-9 Total Score Q9: Thoughts of better off dead/self-harm past 2 weeks   3/6/2024   2:00 PM 5 Not at all  "  4/4/2024   9:39 PM 5 Not at all   7/3/2024   9:00 AM 5 Not at all     AMAURY-7 SCORE Total Score Total Score   3/6/2024   2:00 PM  10   4/4/2024   9:40 PM 4 (minimal anxiety) 4        Substance Use History  Past Use: Numerous drugs in college, former cannabis use. LSD, ketamine, cannabis, DMT, psylocybin, research chemicals. No opioids or benzodiazepines   Treatment [#, most recent]- None  Medical Consequences [withdrawal, sz etc]- None  Legal Consequences- None    Pertinent Substance use  No current use of substance use, denies any current use.   Alcohol: None  Nicotine: None  Caffeine: 600-800mg caffeine a day   Opioids: None  Narcan Kit: N/A  THC/CBD: None   Other Illicit Drugs: none    Psychiatric Summary          Psych hosp: No  Self injurious behaviors: Denies  Suicidal attempts: NO  Suicidal ideation: None   Psychiatry providers: [facility] 2017-6 months ago      History of Violence/Aggression/HI: No   Psychosis hx: yes, see   Trauma hx: None  Outpatient programs: None   Therapy: Last attended in 2022, did attend therapy in college    Psychotropic medications at evaluation 3/6/2024   Lithium ER 300mg   Zoloft (sertraline) 50mg once a day     Past Psychotropic Medication Trials  Vyvanse (lisdexamphetamine) 30mg - expensive  Adderall (amphetamine/dextroamphetamine) ER  Dexedrine - not well tolerated, \"crazy\"  Ritalin - not as effective as adderall   Guanfacine ER 1mg at bedtime - stopped on own didn't think it was helpful   Concerta - helpful, not as well tolerated as adderall   Straterra -   Fluoxetine (Prozac) - was on it for 3 weeks then stopped (would be willing to retry it)  lamotrigine       Lack of efficacy   lithium 900 current Y [place] \"on edge\" at higher doses (900), severe lethargy and memory impairment at doses above 600 mg. Despite side effects works better than other mood stabilizer trials.   bupropion       Maria M, while on lithium   escitalopram       Maria M, while on lithium, OCD " "  atomoxetine     N Hot flashes, nausea. 1 month trial   Adderall    6910-5122 Y \"I hated it\" moderate efficacy   lisdexamfetamine     N Lack of efficacy   dexadrine       Felt like a zombie, dissociated    lurasidone       Somnolence, cognitive slowing   methylphenidate 54 current Y Wears off quick, emotional blunting and low libido.    venlafaxine  150  9513-3602 Y  initially helpful, discontinued due to affect of flattening and emotional blunting. Sexual side effect.   cariprazine  1.5  2021 to current Y     risperidone       Cognitive slowing   aripirazole       Tremors, cognitive slowing   ziprazidone       Cognitive slowing   olanzapine       Cognitive slowing   cariprazine 1.5 2021-current Y Weight gain        MNPMP  reviewed - no record of controlled substances prescribed   PDMP Review       None          Family History   Family History   Problem Relation Age of Onset    Bipolar Disorder Mother     Hyperlipidemia Father     Diabetes Type 1 Father         Late onset    Skin Cancer Maternal Grandfather     Bipolar Disorder Maternal Grandfather     Diabetes Type 2  Maternal Grandfather     Breast Cancer Paternal Grandmother     Coronary Stenting Paternal Grandmother         x9    Diabetes Type 2  Paternal Grandmother     Suicide Cousin      Mother has bipolar and anxiety   Youngest brother has Anxiety   Father, paternal grandfather with alcoholism.  Maternal grandfather and mother have bipolar disorder.   Middle brother with ADHD and autism  Parkinson's and Alzheimer's on both sides of family. Nothing diagnosed in the immediate family.     Past Medical History   Medication allergies:  No Known Allergies  Neurologic Hx [head injury, seizures, etc.]: None  Patient Active Problem List   Diagnosis    Bipolar I disorder (H)     Past Medical History:   Diagnosis Date    Elbow fracture, right      Medications   Current Outpatient Medications   Medication Sig Dispense Refill    amphetamine-dextroamphetamine " (ADDERALL XR) 20 MG 24 hr capsule Take 1 capsule (20 mg) by mouth daily for 30 days 30 capsule 0    lithium ER (LITHOBID) 300 MG CR tablet Take 2 tablets (600 mg) by mouth daily 180 tablet 0    Semaglutide-Weight Management (WEGOVY) 0.25 MG/0.5ML pen Inject 0.25 mg Subcutaneous once a week (Patient not taking: Reported on 3/6/2024) 2 mL 0    sertraline (ZOLOFT) 50 MG tablet Take 1 tablet (50 mg) by mouth daily 90 tablet 0     Physical Exam   There were no vitals taken for this visit.    Pulse Readings from Last 5 Encounters:   07/03/24 74   04/05/24 92   03/06/24 88   12/11/23 88   03/07/23 83    BP Readings from Last 5 Encounters:   07/03/24 124/85   04/05/24 131/88   03/06/24 139/84   12/11/23 130/88   03/07/23 133/89    Wt Readings from Last 5 Encounters:   07/03/24 106.2 kg (234 lb 1.3 oz)   04/05/24 109.8 kg (242 lb)   03/06/24 110.3 kg (243 lb 1.3 oz)   12/11/23 111.6 kg (246 lb)   03/07/23 108 kg (238 lb 3.2 oz)        Liver/kidney function Metabolic Blood counts Deficiency Rule out   Recent Labs   Lab Test 12/11/23  0855 11/08/22  0844 06/02/21  0827   CR 1.11 1.25 1.14   AST  --  23 28   ALT  --  54 49   ALKPHOS  --  128 90    Recent Labs   Lab Test 12/11/23  0855   CHOL 213*   TRIG 182*   *   HDL 33*   TSH 1.27    Recent Labs   Lab Test 11/08/22  0844   WBC 6.9   HGB 17.6   HCT 50.8   MCV 81    No lab results found.     No results found for this or any previous visit.    Mental Status Exam  Alertness: alert  and oriented  Appearance: adequately groomed  Behavior/Demeanor: cooperative, pleasant, and calm  Eye Contact: intact  Speech: normal and regular rate and rhythm  Language: intact and no problems  Psychomotor: normal or unremarkable    Mood: description consistent with euthymia  Affect: full range and appropriate; was congruent to mood  Thought Process/Associations: unremarkable  Thought Content:  Reports none;  Denies suicidal ideation, violent ideation, and delusions   Perception:  Reports  none;  Denies auditory hallucinations and visual hallucinations Does not appear to be responding to internal stimuli.    Insight: good  Judgment: intact  Memory: Grossly intact as assessed by interview. Not formally assessed  Fund of knowledge: Average  Cognition: does  appear grossly intact; formal cognitive testing was not done  Gait and Station:  Unable to assess via video and No concerns identified by report  Assessment & Plan   DSM  Attention deficit hyperactivity disorder (ADHD), predominantly inattentive type  Eating disorder, unspecified type  Bipolar I disorder in remission (H24)  Anxiety     Assessment  Safety:  Low risk for harm towards self or others. Safety plan reviewed as indicated. Local community safety resources in AVS for patient to use and reviewed with pt on an as needed basis.     MDM: Will presents to [facility] with history of bipolar I, depression and ADHD. Generally stable and looking to establish care. [facility]. Will see pt until he is able to be established. Pt is interested in restarting stimulants after he had to stop when he lost job and insurance. Prognosis: good.  4/5/24: Labs completed at 2:44in the afternoon, not at therapeutic lithium level and historically has remained stable on subtherapeutic level. No changes needed today. Continue addrall XR, reviewed following up with therapy and LT psych.Try xylitol lozenges and mouth wash for dry mouth.   7/3/24: Last visit with [facility], stable ADHD and mood symptoms.  Pt will start with long term  psychiatry [facility] Damico 8/29. We dicussed that PCP has not refilled medications in the past and has resulted in him needing to always see psychiatry.     Education: Recommended treatment, treatment side effects, risks, benefits, adverse effects and alternatives.  Reviewed recommended treatment, risks, benefits, and alternatives, potential for abuse/dependence, and medication education. Health  promotion activities recommended and reviewed today, abstaining from substance use.   STIMULANT THERAPY: Risk for HTN, tachycardia, sudden death (with familial cardiac hx), motor/tic, appetite/growth, mood lability and sleep disruption. Black box, risk for abuse, do not share, do not loose, keep locked from others, cannot replace lost scripts.. All questions addressed. Assent for medication/treatment was provided by patient/guardian today. Contact clinic/provider for any problems    Plan  RTC/Next Due:  Follow up with Long term psychiatry.   Disposition: Patient Status: The patient is being referred to LONG TERM PSYCHIATRY care and provider will bridge care until patient is established with new provider.   Medications - 3 month supply sent  Adderall (amphetamine/dextroamphetamine) XR 20mg once a day   Lithium ER 300mg cap, take 2 caps at bedtime  Sertraline (zoloft) 50mg once a day   Therapy: Set to start 9/2024 with [facility] care: Continue all other treatments (including medications) per primary care provider and/or specialists, follow up with primary care provider as planned or for acute medical concerns.  Labs/EKG/Orders: Completed     PROVIDER: ISACC Hurley CNP              ADMINISTRATIVE BILLING  Level of Medical Decision Making:   - At least 1 chronic problem that is not stable  - Engaged in prescription drug management during visit (discussed any medication benefits, side effects, alternatives, etc.)       EPISODE OF CARE [x]  Disclaimer: This note consists of symbols derived from keyboarding, dictation and/or voice recognition software. As a result, there may be errors in the script that have gone undetected. Please consider this when interpreting information found in this chart.

## 2024-07-03 NOTE — PROGRESS NOTES
Mental Health and Collaborative Care Psychiatry Service Rooming Note      Most pressing mental health concern at this time: anxiety      Any new physical health conditions or diagnoses affecting you that we should be aware of: no      Side effects related to medications patient would like to discuss with the provider:  No      Are you taking your medications as prescribed?  yes  If not, why?  N/A      Do you need refills of any of the medications?  no  If so, which ones? N/A      Are you taking any recreational substances? no      Lizeth Clayton CMA  July 3, 2024  9:56 AM

## 2024-07-03 NOTE — Clinical Note
FYI - Pt will be seen in long term psychiatry for continuing psychiatric medications. Currently stable. Pt has completed care with [facility].

## 2024-07-03 NOTE — Clinical Note
Scot Brasher you see this pt 8/29/24. Stable mood symptoms and ADHD. Has been unsuccessful in being returned to PCP.

## 2024-07-03 NOTE — PATIENT INSTRUCTIONS
Treatment plan today   Follow up in 3 months (or sooner as needed) with Long term Psychiatry, Next appointment with Sameera Damico 8/29/24.   Medications  Adderall (amphetamine/dextroamphetamine) XR 20mg once a day   Lithium ER 300mg cap, take 2 caps at bedtime  Sertraline (zoloft) 50mg once a day   Communication  Call the psychiatric nurse line with medication questions or concerns at [phone] or [phone].  MyChart may be used to communicate with your care team, but this is not intended to be used for emergencies.  You can call the above number to make appointments, leave a message with our nursing team, and inquire about any mental health referrals I have placed.  Please call your pharmacy to request a refill of your medications listed above if needed between appointments.   Safety Plan - see below for crisis resources   Call or text 988 for mental health crisis.   Call 911 or use ER for potentially life-threatening situations.     ISACC Aly, CNP, PMHNP  Collaborative Care Psychiatry Service (CCPS)    [facility]         RESOURCES     Crisis Resources  For emergency help, please call 911 or go to the nearest Emergency Department.     Emergency Walk-In Options:   EmPATH Unit @ [facility] ([place]): [phone] - Specialized mental health emergency area designed to be calming  [facility] ([place]): [phone]  [facility] Acute Psychiatry [facility] ([place]): [phone]  [facility]): [phone]    County Crisis Information:   [place]: [phone]  [place]: [phone]  [place] (COPE) - Adult: [phone]     Child: [phone]  [place] - Adult: [phone]     Child: [phone]  [place]: [phone]  List of all [facility] resources:   https://[place].gov/dhs/people-we-serve/adults/health-care/mental-health/resources/crisis-contacts.jsp    National Crisis Information:   National Suicide & Crisis Lifeline: Call 988   For online chat  options, visit https://suicidepreventionlifeline.org/chat/  Poison Control Center: 3-106-729-6643  Poison Control Center: 8-174-070-3212  Trans Lifeline: 1-190.420.4506 - Hotline for transgender people of all ages  The [place] Project: 7-815-071-4924 - Hotline for LGBT youth     For Non-Emergency Support:   Fast Tracker: Mental Health & Substance Use Disorder Resources -   https://www.Survature.org/    Additional Resources  Financial Assistance [phone]  [facility] Billing [phone]  Central Billing Office, ealth: [phone]  [facility] Billing [phone]  Medical Records [phone]  [place] Patient Bill of Rights [email or web address]         Patient Education   Collaborative Care Psychiatry Service  What to Expect  Here's what to expect from your Collaborative Care Psychiatry Service (CCPS).   About CCPS  CCPS means 2 people work together to help you get better. You'll meet with a behavioral health clinician and a psychiatric doctor. A behavioral health clinician helps people with mental health problems by talking with them. A psychiatric doctor helps people by giving them medicine.  How it works  At every visit, you'll see the behavioral health clinician (BHC) first. They'll talk with you about how you're doing and teach you how to feel better.   Then you'll see the psychiatric doctor. This doctor can help you deal with troubling thoughts and feelings by giving you medicine. They'll make sure you know the plan for your care.   CCPS usually takes 3 to 6 visits. If you need more visits, we may have you start seeing a different psychiatric doctor for ongoing care.  If you have any questions or concerns, we'll be glad to talk with you.  About visits  Be open  At your visits, please talk openly about your problems. It may feel hard, but it's the best way for us to help you.  Cancelling visits  If you can't come to your visit, please call us  "right away at [phone]. If you don't cancel at least 24 hours (1 full day) before your visit, that's \"late cancellation.\"  Being late to visits  Being very late is the same as not showing up. You will be a \"no show\" if:  Your appointment starts with a [facility], and you're more than 15 minutes late for a 30-minute (half hour) visit. This will also cancel your appointment with the psychiatric doctor.  Your appointment is with a psychiatric doctor only, and you're more than 15 minutes late for a 30-minute (half hour) visit.  Your appointment is with a psychiatric doctor only, and you're more than 30 minutes late for a 60-minute (full hour) visit.  If you cancel late or don't show up 2 times within 6 months, we may end your care.   Getting help between visits  If you need help between visits, you can call us Monday to Friday from 8 a.m. to 4:30 p.m. at [phone].  Emergency care  Call 911 or go to the nearest emergency department if your life or someone else's life is in danger.  Call 988 anytime to reach the national Suicide and Crisis hotline.  Medicine refills  To refill your medicine, call your pharmacy. You can also call [facility]'s Behavioral Access at [phone], Monday to Friday, 8 a.m. to 4:30 p.m. It can take 1 to 3 business days to get a refill.   Forms, letters, and tests  You may have papers to fill out, like FMLA, short-term disability, and workability. We can help you with these forms at your visits, but you must have an appointment. You may need more than 1 visit for this, to be in an intensive therapy program, or both.  Before we can give you medicine for ADHD, we may refer you to get tested for it or confirm it another way.  We may not be able to give you an emotional support animal letter.  We don't do mental health checks ordered by the court.   We don't do mental health testing, but we can refer you to get tested.   Thank you for choosing us for your care.  For informational " purposes only. Not to replace the advice of your health care provider. Copyright   2022 [facility]. All rights reserved. [employer] 112509 - 12/22.

## 2024-07-09 ENCOUNTER — OFFICE VISIT (OUTPATIENT)
Dept: PHARMACY | Facility: CLINIC | Age: 29
End: 2024-07-09
Payer: COMMERCIAL

## 2024-07-09 DIAGNOSIS — E66.09 CLASS 1 OBESITY DUE TO EXCESS CALORIES WITHOUT SERIOUS COMORBIDITY WITH BODY MASS INDEX (BMI) OF 31.0 TO 31.9 IN ADULT: Primary | ICD-10-CM

## 2024-07-09 DIAGNOSIS — E66.811 CLASS 1 OBESITY DUE TO EXCESS CALORIES WITHOUT SERIOUS COMORBIDITY WITH BODY MASS INDEX (BMI) OF 31.0 TO 31.9 IN ADULT: Primary | ICD-10-CM

## 2024-07-09 DIAGNOSIS — F31.9 BIPOLAR I DISORDER (H): ICD-10-CM

## 2024-07-09 PROCEDURE — 99607 MTMS BY PHARM ADDL 15 MIN: CPT | Performed by: PHARMACIST

## 2024-07-09 PROCEDURE — 99605 MTMS BY PHARM NP 15 MIN: CPT | Performed by: PHARMACIST

## 2024-07-09 NOTE — Clinical Note
Starting patient on Wegovy - I asked him to follow-up with a lithium level 2-3 weeks after starting it.  I don't know that Wegovy will increase lithium levels but thought it was appropriate to be conservative. If you think that's overkill, I'll let him know he can postpone until 6 month level recheck when he establishes with Sameera. Thank you!

## 2024-07-09 NOTE — PROGRESS NOTES
Medication Therapy Management (MTM) Encounter    ASSESSMENT:                            Medication Adherence/Access: No issues identified    Weight management:   GLP-1 RA is appropriate, used cost estimate tool for Wegovy and Zepbound. Even with mfg coupon, cost is ~$400/month.  Discussed option of compounded semaglutide and patient is interested. Education on medication and use of subcutaneous injection.     Bipolar/ADHD:  Stable. During titration phase of GLP-1, possible changes to medication absorption.  While his lithium levels have been low end of the therapeutic range, wonder if semaglutide may impact absorption of ER dosage form and consider recheck 2-3 weeks after starting semaglutide.      PLAN:                            Start semaglutide 0.25mg every 7 days x4 weeks, then increase to 0.5mg every 7 days    Consider lithium level recheck 2-3 weeks after starting semaglutide.    Follow-up: 6 weeks after starting semaglutide    SUBJECTIVE/OBJECTIVE:                          Regino Bailey is a 28 year old male seen for an initial visit. He was referred to me from William Ramos.      Reason for visit: discuss Wegovy.    Allergies/ADRs: Reviewed in chart  Past Medical History: Reviewed in chart  Tobacco: He reports that he has never smoked. He has never used smokeless tobacco.  Alcohol: Less than 1 beverages / week    Medication Adherence/Access: no issues reported    Weight Management   No current medications. He had been prescribed Wegovy but cost was prohibitive.     Nutrition/Eating Habits: while he doesn't have a diagnosis of binge eating disorder, he reports a pattern of binge eating then not allowing himself to eat for up to days at a time to compensate and avoid gaining weight. He would like to try GLP-1 RA for 6-12 months to support healthier portion sizes and weight loss while he also starts working with a therapist on eating and lifestyle behaviors (has upcoming appt with new therapist).    He does  not want to take GLP-1 longterm.   Exercise/Activity: none currently, would like to restart.   Medication History:  None.     Starting Weight: 230-240 lb, patient reported  Goal weight: 200 lb       Mental Health     Bipolar and ADHD  Lithium ER 600mg daily  Sertraline 50mg once daily  Adderall XR 20mg daily  Patient reports no current medication side effects.  Patient reports symptoms are stable.  Patient is seeing a therapist.   Patient is seeing a psychiatrist.         Today's Vitals: There were no vitals taken for this visit.  Patient declined vitals today.   ----------------      I spent 40 minutes with this patient today. All changes were made via collaborative practice agreement with William Ramos NP. A copy of the visit note was provided to the patient's provider(s).    A summary of these recommendations was sent via [employer].    Stacia Reyes, PharmD, BCACP   Medication Therapy Management Pharmacist   [facility]  [phone]          Medication Therapy Recommendations  Class 1 obesity due to excess calories without serious comorbidity with body mass index (BMI) of 31.0 to 31.9 in adult    Current Medication: COMPOUNDED NON-CONTROLLED SUBSTANCE (CMPD RX) - PHARMACY TO MIX COMPOUNDED MEDICATION   Rationale: Untreated condition - Needs additional medication therapy - Indication   Recommendation: Start Medication   Status: Accepted per CPA

## 2024-07-09 NOTE — PATIENT INSTRUCTIONS
"Recommendations from today's MTM visit:                                                    MTM (medication therapy management) is a service provided by a clinical pharmacist designed to help you get the most of out of your medicines.   Today we reviewed what your medicines are for, how to know if they are working, that your medicines are safe and how to make your medicine regimen as easy as possible.      Start semaglutide (Wegovy) 0.25mg every 7 days x4 weeks, then increase to semaglutide 0.5mg every 7 days    Please message psychiatry about having a lithium level checked 2-3 weeks after starting semaglutide    Follow-up: 6 weeks after starting semaglutide    It was great speaking with you today.  I value your experience and would be very thankful for your time in providing feedback in our clinic survey. In the next few days, you may receive an email or text message from [employer] with a link to a survey related to your  clinical pharmacist.\"     To schedule another MTM appointment, please call the clinic directly or you may call the MTM scheduling line at [phone] or toll-free at [phone].     My Clinical Pharmacist's contact information:                                                      Please feel free to contact me with any questions or concerns you have.      Stacia Reyes, PharmD, [facility]   Medication Therapy Management Pharmacist   [facility]  [phone]      Compound Semaglutide at [facility]  [facility] is now offering compounded semaglutide during the time of Wegovy national shortage/limited supply. Semaglutide is the generic name of Wegovy. [facility] is following the highest standards for sterility and compounding practices. Not all compounding practices are equal. Therefore, [facility] will not be prescribing compounded semaglutide outside of the [facility]. " Compounding of semaglutide is legal for as long as Wegovy is on the FDA's national shortage list. When/if Wegovy is taken off the FDA's shortage list, compounded semaglutide will no longer be legal to manufacture. When this occurs, patients will have to turn to acquiring Wegovy via its available manufactured pen, look into alternative weight loss medication(s), or stop the medication. Compound semaglutide will be available as a pre-filled syringe. Due to high demand of compounded semaglutide, orders may take 1-2 weeks to obtain from time of prescribing. Each dose of the medication will require a separate prescription.     As with any weight loss medication(s), there is a risk of weight regain should you stop semaglutide. It is important to be aware of this risk should you stop compounded semaglutide with no plans to transition back to an alternative injectable option as the use of semaglutide is intended for long term weight management with the intention of remaining on this injectable long term.        Obtaining Medication and Storage:   The pharmacy must speak to the patient directly prior to shipping medication to walk through administration, shipping and cost. Pre-filled syringes of compounded semaglutide and needles will be mailed from the compounding pharmacy to your home in a refrigerated box. The pre-filled syringes should be stored in the refrigerator until time of injection. The medication is good for at least 30 days in refrigerator.     Dosing:  Week 1-4: Inject 0.25 mg subcutaneously once weekly  Week 5-8: If tolerating, increase to 0.5 mg once weekly  Week 9-12: If tolerating, increase to 1 mg once weekly  Week 13-15: If tolerating, increase to 1.5 mg once weekly  Week 16-19: If tolerating, increase to 2 mg once weekly  Week 20 & on: If tolerating, increase to 2.5 mg once weekly   *If you are having some nausea or other side effects to where you are hesitant to move up to the next dose, stay at the  same dose you are on for an additional 4 weeks to see if side effect(s) improves/resolves. Make sure to take this time to hydrate and utilizing smaller more consistent meals, such as 4-6 small meals per day.  It may be advantageous to stay at the same dose if you are seeing good efficacy (both on and off the scale) and having minimal/manageable side effects. If you do not have additional refills on that dose's prescription, please reach out to the clinic.    Common Side Effects:  Side effect profile is the same as Wegovy. Monitor for nausea, diarrhea, constipation, headache, indigestion, tiredness (fatigue), stomach upset or abdominal pain. Less commonly, semaglutide can cause low blood sugar (symptoms: shaky, dizzy, sweaty, agitation). Please reach out to the care team should you feel like this is occurring. It is important to ensure that you are eating consistent meals and not skipping meals. Ensure you are getting at least 64 oz water daily. If any side effects become unmanageable, contact the care team immediately.    Administration:  Wash your hands.   Obtain compound semaglutide syringe, needle and alcohol swab. Remove pre-filled syringe from refrigerator. Keep all other unused syringes in the refrigerator until time of use.   Inspect the syringe to ensure medication in syringe is clear/colorless and the clear shell cap on top of red syringe cap is intact.  Unlock the cap by pulling the clear outer shell straight off and remove the red syringe cap by twisting counter clockwise.  Attach needle to syringe by twisting needle onto syringe clockwise. Do not remove needle cap.   Choose injection site. Clean injection site with alcohol swab.    Appropriate areas of injection are: abdomen (at least 2 inches away from belly button) or front middle thigh.   Remove needle cap from syringe/needle.   Hold the syringe like a pencil. Insert the needle into skin at a 90-degree angle.  Using your pointer finger, push the  "syringe plunger down to inject the medicine.  Count to six. Then, remove the syringe from your skin.   Immediately place the syringe in a sharps container.   You can purchase a sharps container from your local pharmacy or [employer]. If you don't have a sharps container, you can use a plastic detergent container with a lid. The container should seal tightly, hold objects without leaking, breaking or cracking, and clearly be labelled \"sharps\".     Compounded Semaglutide monthly (4 pre-filled syringes) cost:   0.25 mg ~$230   0.5 mg ~$260   1 mg ~$306   1.5 mg ~$342   2 mg ~$395   2.5 mg ~$438    [facility] Phone:  [phone]   "

## 2024-07-16 ENCOUNTER — DOCUMENTATION ONLY (OUTPATIENT)
Dept: SLEEP MEDICINE | Facility: CLINIC | Age: 29
End: 2024-07-16
Payer: COMMERCIAL

## 2024-07-18 ENCOUNTER — OFFICE VISIT (OUTPATIENT)
Dept: DERMATOLOGY | Facility: CLINIC | Age: 29
End: 2024-07-18
Payer: COMMERCIAL

## 2024-07-18 DIAGNOSIS — D49.2 NEOPLASM OF SKIN: Primary | ICD-10-CM

## 2024-07-18 PROCEDURE — 11103 TANGNTL BX SKIN EA SEP/ADDL: CPT | Performed by: PHYSICIAN ASSISTANT

## 2024-07-18 PROCEDURE — 88305 TISSUE EXAM BY PATHOLOGIST: CPT | Performed by: DERMATOLOGY

## 2024-07-18 PROCEDURE — 11102 TANGNTL BX SKIN SINGLE LES: CPT | Performed by: PHYSICIAN ASSISTANT

## 2024-07-18 ASSESSMENT — PAIN SCALES - GENERAL: PAINLEVEL: NO PAIN (0)

## 2024-07-18 NOTE — PROGRESS NOTES
[facility] Dermatology Note  Encounter Date: Jul 18, 2024  Office Visit      Dermatology Problem List:    #NUB x 2 , S/p Biopsy performed on 7/18/24: Pending results.   1. Squamous papilloma, anterior lid margin of right eye              - S/p electrodesiccation on 3/7/24  ____________________________________________    Assessment & Plan:    # Neoplasm of unspecified behavior of the skin (D49.2) on the L frontal hairline. The differential diagnosis includes benign irritated nevus vs other. .   - Shave biopsy performed today, see procedure note below.   - Photographed today      # Neoplasm of unspecified behavior of the skin (D49.2) on the Central forehead. The differential diagnosis includes benign irritated nevus vs other. .   - Shave biopsy performed today, see procedure note below.   - Photographed today      - Shave biopsy procedure note, location x 2, . After discussion of benefits and risks including but not limited to bleeding, infection, scar, incomplete removal, recurrence, and non-diagnostic biopsy, written consent and photographs were obtained. The area was cleaned with isopropyl alcohol. 0.5mL of 1% lidocaine with epinephrine was injected to obtain adequate anesthesia of lesion(s). Shave biopsy at site(s) performed. Hemostasis was achieved with aluminium chloride. Petrolatum ointment and a sterile dressing were applied. The patient tolerated the procedure and no complications were noted. The patient was provided with verbal and written post care instructions.      Follow-up: pending path results    Staff and scribe:    Scribe Disclosure:   I, DARYL PICKENS, am serving as a scribe; to document services personally performed by Fanny Urena PA-C -based on data collection and the provider's statements to me.     Provider Disclosure:  I agree with above History, Review of Systems, Physical exam and Plan.  I have reviewed the content of the documentation and have edited it as needed. I  have personally performed the services documented here and the documentation accurately represents those services and the decisions I have made.      Electronically signed by:    All risks, benefits and alternatives were discussed with patient.  Patient is in agreement and understands the assessment and plan.  All questions were answered.    Fanny Urena PA-C, MPAS  [facility] [place]: Phone: [phone], Fax: [phone]  [facility]: Phone: [phone],  Fax: [phone]  [facility]: Phone: [phone], Fax: [phone]  ____________________________________________    CC: Derm Problem (2 spots on forehead would like removed)      Reviewed patients past medical history and pertinent chart review prior to patient's visit today.     HPI:  Mr. Lux Bailey is a 28 year old male who presents today as a return patient for spot check. Today patient reported a two spots of concern on his forehead.    Reported that his father has a hx of skin cancer, NMSC.    Patient is otherwise feeling well, without additional concerns.    Labs:  N/A    Physical Exam:  Vitals: There were no vitals taken for this visit.  SKIN: Focused examination of forehead was performed.   - L frontal hairline there is a 4 mm brown macule   - Central forehead there is a  4 mm flesh colored papule   - No other lesions of concern on areas examined.               Medications:  Current Outpatient Medications   Medication Sig Dispense Refill    amphetamine-dextroamphetamine (ADDERALL XR) 20 MG 24 hr capsule Take 1 capsule (20 mg) by mouth daily for 30 days 30 capsule 0    COMPOUNDED NON-CONTROLLED SUBSTANCE (CMPD RX) - PHARMACY TO MIX COMPOUNDED MEDICATION Semaglutide 0.25mg subcutaneously every 7 days x4 weeks 4 each 0    lithium ER (LITHOBID) 300 MG CR tablet Take 2 tablets (600 mg) by mouth daily 180 tablet 0    sertraline (ZOLOFT) 50 MG  tablet Take 1 tablet (50 mg) by mouth daily 90 tablet 0    [START ON 8/2/2024] amphetamine-dextroamphetamine (ADDERALL XR) 20 MG 24 hr capsule Take 1 capsule (20 mg) by mouth daily for 30 days 30 capsule 0    [START ON 9/1/2024] amphetamine-dextroamphetamine (ADDERALL XR) 20 MG 24 hr capsule Take 1 capsule (20 mg) by mouth daily for 30 days 30 capsule 0    [START ON 8/6/2024] COMPOUNDED NON-CONTROLLED SUBSTANCE (CMPD RX) - PHARMACY TO MIX COMPOUNDED MEDICATION Semaglutide 0.5mg subcutaneously every 7 days 4 each 1     No current facility-administered medications for this visit.      Past Medical/Surgical History:   Patient Active Problem List   Diagnosis    Bipolar I disorder (H)    Class 1 obesity due to excess calories without serious comorbidity with body mass index (BMI) of 31.0 to 31.9 in adult     Past Medical History:   Diagnosis Date    Elbow fracture, right

## 2024-07-18 NOTE — LETTER
7/18/2024      Lux Bailey  [address] [facility] [address]      Dear Colleague,    Thank you for referring your patient, Lux Bailey, to the [facility]. Please see a copy of my visit note below.    [facility] Dermatology Note  Encounter Date: Jul 18, 2024  Office Visit      Dermatology Problem List:    #NUB x 2 , S/p Biopsy performed on 7/18/24: Pending results.   1. Squamous papilloma, anterior lid margin of right eye              - S/p electrodesiccation on 3/7/24  ____________________________________________    Assessment & Plan:    # Neoplasm of unspecified behavior of the skin (D49.2) on the L frontal hairline. The differential diagnosis includes benign irritated nevus vs other. .   - Shave biopsy performed today, see procedure note below.   - Photographed today      # Neoplasm of unspecified behavior of the skin (D49.2) on the Central forehead. The differential diagnosis includes benign irritated nevus vs other. .   - Shave biopsy performed today, see procedure note below.   - Photographed today      - Shave biopsy procedure note, location x 2, . After discussion of benefits and risks including but not limited to bleeding, infection, scar, incomplete removal, recurrence, and non-diagnostic biopsy, written consent and photographs were obtained. The area was cleaned with isopropyl alcohol. 0.5mL of 1% lidocaine with epinephrine was injected to obtain adequate anesthesia of lesion(s). Shave biopsy at site(s) performed. Hemostasis was achieved with aluminium chloride. Petrolatum ointment and a sterile dressing were applied. The patient tolerated the procedure and no complications were noted. The patient was provided with verbal and written post care instructions.      Follow-up: pending path results    Staff and scribe:    Scribe Disclosure:   I, DARYL PICKENS, am serving as a scribe; to document services personally performed by  Fanny Urena PA-C -based on data collection and the provider's statements to me.     Provider Disclosure:  I agree with above History, Review of Systems, Physical exam and Plan.  I have reviewed the content of the documentation and have edited it as needed. I have personally performed the services documented here and the documentation accurately represents those services and the decisions I have made.      Electronically signed by:    All risks, benefits and alternatives were discussed with patient.  Patient is in agreement and understands the assessment and plan.  All questions were answered.    Fanny Urena PA-C, MPAS  [facility] [place]: Phone: [phone], Fax: [phone]  [facility]: Phone: [phone],  Fax: [phone]  [facility]: Phone: [phone], Fax: [phone]  ____________________________________________    CC: Derm Problem (2 spots on forehead would like removed)      Reviewed patients past medical history and pertinent chart review prior to patient's visit today.     HPI:  Mr. Lux Bailey is a 28 year old male who presents today as a return patient for spot check. Today patient reported a two spots of concern on his forehead.    Reported that his father has a hx of skin cancer, NMSC.    Patient is otherwise feeling well, without additional concerns.    Labs:  N/A    Physical Exam:  Vitals: There were no vitals taken for this visit.  SKIN: Focused examination of forehead was performed.   - L frontal hairline there is a 4 mm brown macule   - Central forehead there is a  4 mm flesh colored papule   - No other lesions of concern on areas examined.               Medications:  Current Outpatient Medications   Medication Sig Dispense Refill     amphetamine-dextroamphetamine (ADDERALL XR) 20 MG 24 hr capsule Take 1 capsule (20 mg) by mouth daily for 30 days 30 capsule 0     COMPOUNDED  NON-CONTROLLED SUBSTANCE (CMPD RX) - PHARMACY TO MIX COMPOUNDED MEDICATION Semaglutide 0.25mg subcutaneously every 7 days x4 weeks 4 each 0     lithium ER (LITHOBID) 300 MG CR tablet Take 2 tablets (600 mg) by mouth daily 180 tablet 0     sertraline (ZOLOFT) 50 MG tablet Take 1 tablet (50 mg) by mouth daily 90 tablet 0     [START ON 8/2/2024] amphetamine-dextroamphetamine (ADDERALL XR) 20 MG 24 hr capsule Take 1 capsule (20 mg) by mouth daily for 30 days 30 capsule 0     [START ON 9/1/2024] amphetamine-dextroamphetamine (ADDERALL XR) 20 MG 24 hr capsule Take 1 capsule (20 mg) by mouth daily for 30 days 30 capsule 0     [START ON 8/6/2024] COMPOUNDED NON-CONTROLLED SUBSTANCE (CMPD RX) - PHARMACY TO MIX COMPOUNDED MEDICATION Semaglutide 0.5mg subcutaneously every 7 days 4 each 1     No current facility-administered medications for this visit.      Past Medical/Surgical History:   Patient Active Problem List   Diagnosis     Bipolar I disorder (H)     Class 1 obesity due to excess calories without serious comorbidity with body mass index (BMI) of 31.0 to 31.9 in adult     Past Medical History:   Diagnosis Date     Elbow fracture, right                         Again, thank you for allowing me to participate in the care of your patient.        Sincerely,        Fanny Urena PA-C

## 2024-07-18 NOTE — PATIENT INSTRUCTIONS
Wound Care After a Biopsy    What is a skin biopsy?  A skin biopsy allows the doctor to examine a very small piece of tissue under the microscope to determine the diagnosis and the best treatment for the skin condition. A local anesthetic (numbing medicine)  is injected with a very small needle into the skin area to be tested. A small piece of skin is taken from the area. Sometimes a suture (stitch) is used.     What are the risks of a skin biopsy?  I will experience scar, bleeding, swelling, pain, crusting and redness. I may experience incomplete removal or recurrence. Risks of this procedure are excessive bleeding, bruising, infection, nerve damage, numbness, thick (hypertrophic or keloidal) scar and non-diagnostic biopsy.    How should I care for my wound for the first 24 hours?  Keep the wound dry and covered for 24 hours  If it bleeds, hold direct pressure on the area for 15 minutes. If bleeding does not stop then go to the emergency room  Avoid strenuous exercise the first 1-2 days or as your doctor instructs you    How should I care for the wound after 24 hours?  After 24 hours, remove the bandage  You may bathe or shower as normal  If you had a scalp biopsy, you can shampoo as usual and can use shower water to clean the biopsy site daily  Clean the wound twice a day with gentle soap and water  Do not scrub, be gentle  Apply white petroleum/Vaseline after cleaning the wound with a cotton swab or a clean finger, and keep the site covered with a Bandaid /bandage. Bandages are not necessary with a scalp biopsy  If you are unable to cover the site with a Bandaid /bandage, re-apply ointment 2-3 times a day to keep the site moist. Moisture will help with healing  Avoid strenuous activity for first 1-2 days  Avoid lakes, rivers, pools, and oceans until the stitches are removed or the site is healed    How do I clean my wound?  Wash hands thoroughly with soap or use hand  before all wound care  Clean the  wound with gentle soap and water  Apply white petroleum/Vaseline  to wound after it is clean  Replace the Bandaid /bandage to keep the wound covered for the first few days or as instructed by your doctor  If you had a scalp biopsy, warm shower water to the area on a daily basis should suffice    What should I use to clean my wound?   Cotton-tipped applicators (Qtips )  White petroleum jelly (Vaseline ). Use a clean new container and use Q-tips to apply.  Bandaids   as needed  Gentle soap     How should I care for my wound long term?  Do not get your wound dirty  Keep up with wound care for one week or until the area is healed.  A small scab will form and fall off by itself when the area is completely healed. The area will be red and will become pink in color as it heals. Sun protection is very important for how your scar will turn out. Sunscreen with an SPF 30 or greater is recommended once the area is healed.  If you have stitches, stitches need to be removed in 10 days. You may return to our clinic for this or you may have it done locally at your doctor s office.  You should have some soreness but it should be mild and slowly go away over several days. Talk to your doctor about using tylenol for pain,    When should I call my doctor?  If you have increased:   Pain or swelling  Pus or drainage (clear or slightly yellow drainage is ok)  Temperature over 100F  Spreading redness or warmth around wound    When will I hear about my results?  The biopsy results can take 2-3 weeks to come back. The clinic will call you with the results, send you a Edait message, or have you schedule a follow-up clinic or phone time to discuss the results. Contact our clinics if you do not hear from us in 3 weeks.     Who should I call with questions?  [facility]: [phone]   [facility]: [phone]  For urgent needs outside of business hours call the [facility]  at [phone] and ask for the dermatology resident on call      Patient Education       Proper skin care from [facility]:    -Eliminate harsh soaps as they strip the natural oils from the skin, often resulting in dry itchy skin ( i.e. Dial, Zest, Celia Spring)  -Use mild soaps such as Cetaphil or Dove Sensitive Skin in the shower. You do not need to use soap on arms, legs, and trunk every time you shower unless visibly soiled.   -Avoid hot or cold showers.  -After showering, lightly dry off and apply moisturizing within 2-3 minutes. This will help trap moisture in the skin.   -Aggressive use of a moisturizer at least 1-2 times a day to the entire body (including -Vanicream, Cetaphil, Aquaphor or Cerave) and moisturize hands after every washing.  -We recommend using moisturizers that come in a tub that needs to be scooped out, not a pump. This has more of an oil base. It will hold moisture in your skin much better than a water base moisturizer. The above recommended are non-pore clogging.      Wear a sunscreen with at least SPF 30 on your face, ears, neck and V of the chest daily. Wear sunscreen on other areas of the body if those areas are exposed to the sun throughout the day. Sunscreens can contain physical and/or chemical blockers. Physical blockers are less likely to clog pores, these include zinc oxide and titanium dioxide. Reapply every two hour and after swimming.     Sunscreen examples: https://www.ewg.org/sunscreen/    UV radiation  UVA radiation remains constant throughout the day and throughout the year. It is a longer wavelength than UVB and therefore penetrates deeper into the skin leading to immediate and delayed tanning, photoaging, and skin cancer. 70-80% of UVA and UVB radiation occurs between the hours of 10am-2pm.  UVB radiation  UVB radiation causes the most harmful effects and is more significant during the summer months. However, snow and ice can reflect UVB radiation leading to  skin damage during the winter months as well. UVB radiation is responsible for tanning, burning, inflammation, delayed erythema (pinkness), pigmentation (brown spots), and skin cancer.     I recommend self monthly full body exams and yearly full body exams with a dermatology provider. If you develop a new or changing lesion please follow up for examination. Most skin cancers are pink and scaly or pink and pearly. However, we do see blue/brown/black skin cancers.  Consider the ABCDEs of melanoma when giving yourself your monthly full body exam ( don't forget the groin, buttocks, feet, toes, etc). A-asymmetry, B-borders, C-color, D-diameter, E-elevation or evolving. If you see any of these changes please follow up in clinic. If you cannot see your back I recommend purchasing a hand held mirror to use with a larger wall mirror.       Checking for Skin Cancer  You can find cancer early by checking your skin each month. There are 3 kinds of skin cancer. They are melanoma, basal cell carcinoma, and squamous cell carcinoma. Doing monthly skin checks is the best way to find new marks or skin changes. Follow the instructions below for checking your skin.   The ABCDEs of checking moles for melanoma   Check your moles or growths for signs of melanoma using ABCDE:   Asymmetry: the sides of the mole or growth don t match  Border: the edges are ragged, notched, or blurred  Color: the color within the mole or growth varies  Diameter: the mole or growth is larger than 6 mm (size of a pencil eraser)  Evolving: the size, shape, or color of the mole or growth is changing (evolving is not shown in the images below)    Checking for other types of skin cancer  Basal cell carcinoma or squamous cell carcinoma have symptoms such as:     A spot or mole that looks different from all other marks on your skin  Changes in how an area feels, such as itching, tenderness, or pain  Changes in the skin's surface, such as oozing, bleeding, or  scaliness  A sore that does not heal  New swelling or redness beyond the border of a mole    Who s at risk?  Anyone can get skin cancer. But you are at greater risk if you have:   Fair skin, light-colored hair, or light-colored eyes  Many moles or abnormal moles on your skin  A history of sunburns from sunlight or tanning beds  A family history of skin cancer  A history of exposure to radiation or chemicals  A weakened immune system  If you have had skin cancer in the past, you are at risk for recurring skin cancer.   How to check your skin  Do your monthly skin checkups in front of a full-length mirror. Check all parts of your body, including your:   Head (ears, face, neck, and scalp)  Torso (front, back, and sides)  Arms (tops, undersides, upper, and lower armpits)  Hands (palms, backs, and fingers, including under the nails)  Buttocks and genitals  Legs (front, back, and sides)  Feet (tops, soles, toes, including under the nails, and between toes)  If you have a lot of moles, take digital photos of them each month. Make sure to take photos both up close and from a distance. These can help you see if any moles change over time.   Most skin changes are not cancer. But if you see any changes in your skin, call your doctor right away. Only he or she can diagnose a problem. If you have skin cancer, seeing your doctor can be the first step toward getting the treatment that could save your life.   [employer] last reviewed this educational content on 4/1/2019 2000-2020 [employer]. [address] [place] [address], [place] [address]. All rights reserved. This information is not intended as a substitute for professional medical care. Always follow your healthcare professional's instructions.       When should I call my doctor?  If you are worsening or not improving, please, contact us or seek urgent care as noted below.     Who should I call with questions (adults)?  [facility] (adult  and pediatric): [phone]  [facility] (adult): [phone]  [facility] ([place], [place], [place] and [place]) [phone]  For urgent needs outside of business hours call the [facility] at [phone] and ask for the dermatology resident on call to be paged  If this is a medical emergency and you are unable to reach an ER, Call [phone]      If you need a prescription refill, please contact your pharmacy. Refills are approved or denied by our Physicians during normal business hours, Monday through Fridays  Per office policy, refills will not be granted if you have not been seen within the past year (or sooner depending on your child's condition)

## 2024-07-19 LAB
PATH REPORT.COMMENTS IMP SPEC: NORMAL
PATH REPORT.COMMENTS IMP SPEC: NORMAL
PATH REPORT.FINAL DX SPEC: NORMAL
PATH REPORT.GROSS SPEC: NORMAL
PATH REPORT.MICROSCOPIC SPEC OTHER STN: NORMAL
PATH REPORT.RELEVANT HX SPEC: NORMAL

## 2024-08-12 ENCOUNTER — MYC MEDICAL ADVICE (OUTPATIENT)
Dept: PSYCHIATRY | Facility: CLINIC | Age: 29
End: 2024-08-12
Payer: COMMERCIAL

## 2024-08-12 DIAGNOSIS — F90.0 ATTENTION DEFICIT HYPERACTIVITY DISORDER (ADHD), PREDOMINANTLY INATTENTIVE TYPE: ICD-10-CM

## 2024-08-13 RX ORDER — DEXTROAMPHETAMINE SACCHARATE, AMPHETAMINE ASPARTATE MONOHYDRATE, DEXTROAMPHETAMINE SULFATE AND AMPHETAMINE SULFATE 5; 5; 5; 5 MG/1; MG/1; MG/1; MG/1
20 CAPSULE, EXTENDED RELEASE ORAL DAILY
Qty: 30 CAPSULE | Refills: 0 | Status: SHIPPED | OUTPATIENT
Start: 2024-08-13 | End: 2024-08-13

## 2024-08-13 RX ORDER — DEXTROAMPHETAMINE SACCHARATE, AMPHETAMINE ASPARTATE MONOHYDRATE, DEXTROAMPHETAMINE SULFATE AND AMPHETAMINE SULFATE 2.5; 2.5; 2.5; 2.5 MG/1; MG/1; MG/1; MG/1
20 CAPSULE, EXTENDED RELEASE ORAL DAILY
Qty: 60 CAPSULE | Refills: 0 | Status: SHIPPED | OUTPATIENT
Start: 2024-08-13

## 2024-08-29 ENCOUNTER — OFFICE VISIT (OUTPATIENT)
Dept: PSYCHIATRY | Facility: CLINIC | Age: 29
End: 2024-08-29
Attending: NURSE PRACTITIONER
Payer: COMMERCIAL

## 2024-08-29 VITALS
WEIGHT: 221 LBS | OXYGEN SATURATION: 96 % | HEART RATE: 78 BPM | SYSTOLIC BLOOD PRESSURE: 113 MMHG | HEIGHT: 72 IN | BODY MASS INDEX: 29.93 KG/M2 | DIASTOLIC BLOOD PRESSURE: 76 MMHG

## 2024-08-29 DIAGNOSIS — F90.0 ATTENTION DEFICIT HYPERACTIVITY DISORDER (ADHD), PREDOMINANTLY INATTENTIVE TYPE: ICD-10-CM

## 2024-08-29 DIAGNOSIS — Z79.899 HIGH RISK MEDICATION USE: ICD-10-CM

## 2024-08-29 DIAGNOSIS — F31.9 BIPOLAR 1 DISORDER (H): Primary | ICD-10-CM

## 2024-08-29 DIAGNOSIS — F31.9 BIPOLAR I DISORDER (H): ICD-10-CM

## 2024-08-29 LAB
AMPHETAMINE QUAL URINE POCT: ABNORMAL
BARBITURATE QUAL URINE POCT: NEGATIVE
BENZODIAZEPINE QUAL URINE POCT: NEGATIVE
BUPRENORPHINE QUAL URINE POCT: NEGATIVE
COCAINE QUAL URINE POCT: NEGATIVE
CREATININE QUAL URINE POCT: ABNORMAL
INTERNAL QC QUAL URINE POCT: ABNORMAL
MDMA QUAL URINE POCT: NEGATIVE
METHADONE QUAL URINE POCT: NEGATIVE
METHAMPHETAMINE QUAL URINE POCT: NEGATIVE
OPIATE QUAL URINE POCT: NEGATIVE
OXYCODONE QUAL URINE POCT: NEGATIVE
PH QUAL URINE POCT: ABNORMAL
PHENCYCLIDINE QUAL URINE POCT: NEGATIVE
POCT KIT EXPIRATION DATE: ABNORMAL
POCT KIT LOT NUMBER: ABNORMAL
SPECIFIC GRAVITY POCT: 1.02
TEMPERATURE URINE POCT: ABNORMAL
THC QUAL URINE POCT: NEGATIVE

## 2024-08-29 PROCEDURE — 80305 DRUG TEST PRSMV DIR OPT OBS: CPT | Performed by: NURSE PRACTITIONER

## 2024-08-29 ASSESSMENT — ANXIETY QUESTIONNAIRES
7. FEELING AFRAID AS IF SOMETHING AWFUL MIGHT HAPPEN: NOT AT ALL
GAD7 TOTAL SCORE: 4
7. FEELING AFRAID AS IF SOMETHING AWFUL MIGHT HAPPEN: NOT AT ALL
6. BECOMING EASILY ANNOYED OR IRRITABLE: SEVERAL DAYS
4. TROUBLE RELAXING: SEVERAL DAYS
IF YOU CHECKED OFF ANY PROBLEMS ON THIS QUESTIONNAIRE, HOW DIFFICULT HAVE THESE PROBLEMS MADE IT FOR YOU TO DO YOUR WORK, TAKE CARE OF THINGS AT HOME, OR GET ALONG WITH OTHER PEOPLE: NOT DIFFICULT AT ALL
1. FEELING NERVOUS, ANXIOUS, OR ON EDGE: SEVERAL DAYS
5. BEING SO RESTLESS THAT IT IS HARD TO SIT STILL: NOT AT ALL
2. NOT BEING ABLE TO STOP OR CONTROL WORRYING: NOT AT ALL
3. WORRYING TOO MUCH ABOUT DIFFERENT THINGS: SEVERAL DAYS
8. IF YOU CHECKED OFF ANY PROBLEMS, HOW DIFFICULT HAVE THESE MADE IT FOR YOU TO DO YOUR WORK, TAKE CARE OF THINGS AT HOME, OR GET ALONG WITH OTHER PEOPLE?: NOT DIFFICULT AT ALL

## 2024-08-29 ASSESSMENT — PATIENT HEALTH QUESTIONNAIRE - PHQ9
10. IF YOU CHECKED OFF ANY PROBLEMS, HOW DIFFICULT HAVE THESE PROBLEMS MADE IT FOR YOU TO DO YOUR WORK, TAKE CARE OF THINGS AT HOME, OR GET ALONG WITH OTHER PEOPLE: NOT DIFFICULT AT ALL
SUM OF ALL RESPONSES TO PHQ QUESTIONS 1-9: 5
SUM OF ALL RESPONSES TO PHQ QUESTIONS 1-9: 5

## 2024-08-29 ASSESSMENT — PAIN SCALES - GENERAL: PAINLEVEL: NO PAIN (0)

## 2024-08-29 NOTE — PATIENT INSTRUCTIONS
"The [facility]  What to Expect  Here's what to expect in the [facility] Program.   About the program  You'll be meeting with a psychiatric doctor to check your mental health. A psychiatric doctor helps you deal with troubling thoughts and feelings by giving you medicine. They'll make sure you know the plan for your care. You may see them for a long time. When you're feeling better, they may refer you back to seeing your family doctor.   If you have any questions, we'll be glad to talk to you.  About visits  Be open  At your visits, please talk openly about your problems. It may feel hard, but it's the best way for us to help you.  Cancelling visits  If you can't come to your visit, please call us right away at [phone]. If you don't cancel at least 24 hours (1 full day) before your visit, that's \"late cancellation.\"  Not showing up for your visits  Being very late is the same as not showing up. You'll be a \"no show\" if:  You're more than 15 minutes late for a 30-minute (half hour) visit.  You're more than 30 minutes late for a 60-minute (full hour) visit.  If you cancel late or don't show up 2 times within 6 months, we may end your care.  Getting help between visits  If you need help between visits, you can call us Monday to Friday from 8 a.m. to 4:30 p.m. at [phone].  Emergency care  Call 911 or go to the nearest emergency department if your life or someone else's life is in danger.  Call 988 anytime to reach the national Suicide and Crisis hotline.  Medicine refills  To refill your medicine, call your pharmacy. You can also call [facility] at [phone], Monday to Friday, 8 a.m. to 4:30 p.m. It can take 1 to 3 business days to get a refill.   Forms, letters, and tests  You may have papers to fill out, like FMLA, short-term disability, and workability. We can help you with these forms at your visits, but you must have an appointment. You may need more " than 1 visit for this, to be in an intensive therapy program, or both.  Before we can give you medicine for ADHD, we may refer you to get tested for it or confirm it another way.  We may not be able to give you an emotional support animal letter.  We don't do mental health checks ordered by the court.   We don't do mental health testing, but we can refer you to get tested.   Thank you for choosing us for your care.  For informational purposes only. Not to replace the advice of your health care provider. Copyright   2022 [facility]. All rights reserved. [employer] 834177 - 12/22      After Visit Summary   Continue medications as prescribed  Have your pharmacy contact us for a refill if you are running low on medications (We may ask you to come into clinic to get a refill from the nurse  No Alcohol or drug use  No driving if sedated  Call the clinic with any questions or concerns   Reach out for help if you feel like hurting yourself or others ([facility] [phone]: [address] [facility], [address] or [facility] Suicide Hotline   [phone] , call 911 or go to nearest Emergency room     Crisis Resources:    Present to the Emergency Department as needed or call after hours crisis line at [phone] or [phone].   [place] Crisis Text Line: Text [place] to 639474.  Suicide LifeLine Chat: suicidepreventionlifeline.org/chat/.  National Suicide Prevention Lifeline: 614.799.2684 (TTY: 459.304.3915). Call anytime for help.  (www.suicidepreventionlifeline.org)  National [place] on Mental Illness (www.ileana.org): 332.738.2071 or 029-845-3380.  Mental Health Association (www.mentalhealth.org): 805.585.4973 or 228-040-5096.       Follow up as directed, for your appointments, per your After Visit Summary Form.
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
## 2024-08-29 NOTE — PROGRESS NOTES
Mental Health and Collaborative Care Psychiatry Service Rooming Note      Most pressing mental health concern at this time: Recheck  Pt is a transfer from [facility]      Any new physical health conditions or diagnoses affecting you that we should be aware of: denies      Side effects related to medications patient would like to discuss with the provider:  No      Are you taking your medications as prescribed?  yes    Do you need refills of any of the medications?  Will talk to provider      Are you taking any recreational substances? denies        Add attendance guidelines .phrase here   Pt aware          Vibha Taylor LPN  August 29, 2024  8:38 AM

## 2024-09-06 ENCOUNTER — LAB (OUTPATIENT)
Dept: LAB | Facility: CLINIC | Age: 29
End: 2024-09-06
Payer: COMMERCIAL

## 2024-09-06 ENCOUNTER — APPOINTMENT (OUTPATIENT)
Dept: LAB | Facility: CLINIC | Age: 29
End: 2024-09-06
Payer: COMMERCIAL

## 2024-09-06 DIAGNOSIS — Z79.899 HIGH RISK MEDICATION USE: ICD-10-CM

## 2024-09-06 LAB
ALBUMIN SERPL BCG-MCNC: 4.8 G/DL (ref 3.5–5.2)
ALP SERPL-CCNC: 103 U/L (ref 40–150)
ALT SERPL W P-5'-P-CCNC: 27 U/L (ref 0–70)
AMPHETAMINES UR QL SCN: ABNORMAL
ANION GAP SERPL CALCULATED.3IONS-SCNC: 11 MMOL/L (ref 7–15)
AST SERPL W P-5'-P-CCNC: 21 U/L (ref 0–45)
BARBITURATES UR QL SCN: ABNORMAL
BENZODIAZ UR QL SCN: ABNORMAL
BILIRUB SERPL-MCNC: 0.5 MG/DL
BUN SERPL-MCNC: 15.9 MG/DL (ref 6–20)
BZE UR QL SCN: ABNORMAL
CALCIUM SERPL-MCNC: 9.5 MG/DL (ref 8.8–10.4)
CANNABINOIDS UR QL SCN: ABNORMAL
CHLORIDE SERPL-SCNC: 104 MMOL/L (ref 98–107)
CREAT SERPL-MCNC: 1.23 MG/DL (ref 0.67–1.17)
EGFRCR SERPLBLD CKD-EPI 2021: 83 ML/MIN/1.73M2
ERYTHROCYTE [DISTWIDTH] IN BLOOD BY AUTOMATED COUNT: 12.8 % (ref 10–15)
FENTANYL UR QL: ABNORMAL
GLUCOSE SERPL-MCNC: 91 MG/DL (ref 70–99)
HCO3 SERPL-SCNC: 26 MMOL/L (ref 22–29)
HCT VFR BLD AUTO: 51.3 % (ref 40–53)
HGB BLD-MCNC: 17.1 G/DL (ref 13.3–17.7)
MCH RBC QN AUTO: 28 PG (ref 26.5–33)
MCHC RBC AUTO-ENTMCNC: 33.3 G/DL (ref 31.5–36.5)
MCV RBC AUTO: 84 FL (ref 78–100)
OPIATES UR QL SCN: ABNORMAL
PCP QUAL URINE (ROCHE): ABNORMAL
PLATELET # BLD AUTO: 194 10E3/UL (ref 150–450)
POTASSIUM SERPL-SCNC: 3.8 MMOL/L (ref 3.4–5.3)
PROT SERPL-MCNC: 7.1 G/DL (ref 6.4–8.3)
RBC # BLD AUTO: 6.1 10E6/UL (ref 4.4–5.9)
SODIUM SERPL-SCNC: 141 MMOL/L (ref 135–145)
WBC # BLD AUTO: 7.6 10E3/UL (ref 4–11)

## 2024-09-06 PROCEDURE — 85027 COMPLETE CBC AUTOMATED: CPT | Performed by: PATHOLOGY

## 2024-09-06 PROCEDURE — 99000 SPECIMEN HANDLING OFFICE-LAB: CPT | Performed by: PATHOLOGY

## 2024-09-06 PROCEDURE — G0480 DRUG TEST DEF 1-7 CLASSES: HCPCS | Mod: 90 | Performed by: PATHOLOGY

## 2024-09-06 PROCEDURE — 36415 COLL VENOUS BLD VENIPUNCTURE: CPT | Performed by: PATHOLOGY

## 2024-09-06 PROCEDURE — 80307 DRUG TEST PRSMV CHEM ANLYZR: CPT | Performed by: NURSE PRACTITIONER

## 2024-09-06 PROCEDURE — 80053 COMPREHEN METABOLIC PANEL: CPT | Performed by: PATHOLOGY

## 2024-09-08 LAB
ATRIAL RATE - MUSE: 60 BPM
DIASTOLIC BLOOD PRESSURE - MUSE: NORMAL MMHG
INTERPRETATION ECG - MUSE: NORMAL
P AXIS - MUSE: 17 DEGREES
PR INTERVAL - MUSE: 182 MS
QRS DURATION - MUSE: 90 MS
QT - MUSE: 406 MS
QTC - MUSE: 406 MS
R AXIS - MUSE: 51 DEGREES
SYSTOLIC BLOOD PRESSURE - MUSE: NORMAL MMHG
T AXIS - MUSE: 67 DEGREES
VENTRICULAR RATE- MUSE: 60 BPM

## 2024-09-10 ENCOUNTER — MYC MEDICAL ADVICE (OUTPATIENT)
Dept: PSYCHIATRY | Facility: CLINIC | Age: 29
End: 2024-09-10
Payer: COMMERCIAL

## 2024-09-10 ENCOUNTER — VIRTUAL VISIT (OUTPATIENT)
Dept: PSYCHOLOGY | Facility: CLINIC | Age: 29
End: 2024-09-10
Attending: NURSE PRACTITIONER
Payer: COMMERCIAL

## 2024-09-10 DIAGNOSIS — F31.9 BIPOLAR I DISORDER (H): Primary | ICD-10-CM

## 2024-09-10 DIAGNOSIS — F41.1 GENERALIZED ANXIETY DISORDER: ICD-10-CM

## 2024-09-10 DIAGNOSIS — F90.0 ATTENTION DEFICIT HYPERACTIVITY DISORDER (ADHD), PREDOMINANTLY INATTENTIVE TYPE: ICD-10-CM

## 2024-09-10 DIAGNOSIS — F90.2 ADHD (ATTENTION DEFICIT HYPERACTIVITY DISORDER), COMBINED TYPE: ICD-10-CM

## 2024-09-10 PROCEDURE — 90791 PSYCH DIAGNOSTIC EVALUATION: CPT | Mod: 95 | Performed by: SOCIAL WORKER

## 2024-09-10 ASSESSMENT — PATIENT HEALTH QUESTIONNAIRE - PHQ9
SUM OF ALL RESPONSES TO PHQ QUESTIONS 1-9: 8
10. IF YOU CHECKED OFF ANY PROBLEMS, HOW DIFFICULT HAVE THESE PROBLEMS MADE IT FOR YOU TO DO YOUR WORK, TAKE CARE OF THINGS AT HOME, OR GET ALONG WITH OTHER PEOPLE: SOMEWHAT DIFFICULT
SUM OF ALL RESPONSES TO PHQ QUESTIONS 1-9: 8

## 2024-09-10 NOTE — TELEPHONE ENCOUNTER
"Patient is requesting a refill of his Adderall XR 20 mg. It was ordered on 9/1/24 but [facility] doesn't have it in stock. He would like it to go to [facility].  He also completed his labs, EKG and urine test. His records were also sent regarding the ADHD testing.     Date of Last Office Visit: 9/5/24  Date of Next Office Visit:  12/2/24  No shows since last visit: No  More than one patient-initiated cancellation (with reschedule) since last seen in clinic? No    []Medication refilled per  Medication Refill in Ambulatory Care  policy.  [x]Medication unable to be refilled by RN due to criteria not met as indicated below:    []Eligibility: has not had a provider visit within last 6 months   []Supervision: no future appointment; < 7 days before next appointment   []Compliance: no shows; cancellations; lapse in therapy   []Verification: order discrepancy; may need modification...   [] > 30-day supply request   []Advanced refill request: > 7 days before refill date   [x]Controlled medication   []Medication not included in policy   []Review: new med; med adjusted ? 30 days; safety alert; requires lab monitoring...   []Scope of Practice: refill request processed by LPN/MA   []Other:      Medication(s) requested:     -  amphetamine-dextroamphetamine (ADDERALL XR) 20 MG 24 hr capsule   Date last ordered: 8/2/24  Qty: 30  Refills: 0  Appropriate for refill? Yes Last prescription was written on 8/13/24 for 10 mg tabs (2 tabs) to equal the 20 mg.     Any Controlled Substance(s)? Yes   [place]  checked? No; not current delegate      Requested medication(s) verified as identical to current order? Yes    Any lapse in adherence to medication(s) greater than 5 days? No      Additional action taken? cued up medication/order(s).      Last visit treatment plan:    -patient has a history \" depression, schizophrenia 2016 when starting college and ADHD 2017. Was initially diagnosed with schizophrenia and placed on antipsychotic for 2 years. In " "2018 diagnosis changed from schizophrenia to bipolar disorder and placed on lamotrigine. Lamotrigine was not effective and transitioned to lithium at end of 2018. Mood has been stable with no manic episodes since starting lithium. Mood symptoms have been well managed at subtherapeutic lithium levels. He recalls he grew up in a family where \"mental illness wasn't real\" but struggled with depression starting in 1st or 2nd grade       Per patient statement today: He is feeling fairly stable on current medications.  Denies any marline or hypomania symptoms.  Depression and anxiety are manageable at this time.  Enjoys his job working as a .  He has an appointment to initiate psychotherapy care coming up soon  He reports compliance with current medications and denies side effects.   reviewed no notable issues     Patient appears to be tolerating current medications without any issues.  He reports focusing better on Adderall without any induction of marline or anxiety.  He is unsure whether he wants to continue with sertraline unsure whether he gets any additional benefit from being on it but wants to keep it for now and we will revisit this issue during her next appointment.  Patient will continue on current medications and return to the clinic approximately 12 weeks for follow-up appointment.  Patient to call in between visits any questions or concerns.    Any medication(s) require lab monitoring? No              "

## 2024-09-10 NOTE — PROGRESS NOTES
"[facility] Counseling         PATIENT'S NAME: Lux Bailey  PREFERRED NAME: Regino  PRONOUNS:   MRN: [id number]  : 1995  ADDRESS: [address] [place] [address] [id number] [place], [place] [address]T. NUMBER:  [id number]  DATE OF SERVICE: 9/10/24  START TIME: 11:01am  END TIME: 11:48am  PREFERRED PHONE: [phone]  May we leave a program related message: Yes  EMERGENCY CONTACT: was obtained .  SERVICE MODALITY:  Video Visit:      Provider verified identity through the following two step process.  Patient provided:  Patient photo and Patient was verified at admission/transfer    Telemedicine Visit: The patient's condition can be safely assessed and treated via synchronous audio and visual telemedicine encounter.      Reason for Telemedicine Visit: Patient has requested telehealth visit    Originating Site (Patient Location): Patient's home    Distant Site (Provider Location): Provider Remote Setting- Home Office    Consent:  The patient/guardian has verbally consented to: the potential risks and benefits of telemedicine (video visit) versus in person care; bill my insurance or make self-payment for services provided; and responsibility for payment of non-covered services.     Patient would like the video invitation sent by:  Send to e-mail at: [email or web address]    Mode of Communication:  Video Conference via Amwell    Distant Location (Provider):  Off-site    As the provider I attest to compliance with applicable laws and regulations related to telemedicine.    UNIVERSAL ADULT Mental Health DIAGNOSTIC ASSESSMENT    Identifying Information:  Patient is a 28 year old,   individual.  Patient was referred for an assessment by current Behavioral Health Provider.  Patient attended the session alone.    Chief Complaint:   The reason for seeking services at this time is: \"Managing my ADHD, Overcoming my binge eating disorder\".  The problem(s) began 18.    Patient has attempted to resolve " these concerns in the past through previous therapy services .    Social/Family History:  Patient reported that he grew up in other [place] and [facility]. He was raised by his biological parents. Parents were always together. Patient reported that his childhood was not easy. Patient described his current relationships with family of origin as positive and supportive with one of his brothers and his mother, and somewhat strained with his other brother.     The patient describes his cultural background as .  Cultural influences and impact on patient's life structure, values, norms, and healthcare: Nothing of note. Contextual influences on patient's health include: Contextual Factors: Individual Factors Patient reports a history of challenges with mood and anxiety symptoms, and well as focus and concentration. He also reports a history of disordered eating patterns, all of which has impacted his mental health and functioning. Patient reports that he will returning to school this year, and also works full-time. and Family Factors Patient reports that his family lives in another part of the country, and notes that it is sometimes difficult to live so far away from one another. He reports that his childhood was difficult at times, as the family lived in poverty, and his father struggled with substance use. He reports that he has good relationships with family overall at this time .  These factors will be addressed in the Preliminary Treatment plan. Patient identified his preferred language to be English. Patient reported that he does not need the assistance of an  or other support involved in therapy.     Patient reported that he had no significant delays in developmental tasks. Patient's highest education level was some college.  Patient identified the following learning problems: none reported.  Modifications will not be used to assist communication in therapy. Patient reports that he is  able to understand written materials.    Patient's current relationship status is single. Patient identified his sexual orientation as bi-sexual.  Patient reported having no child(padmini). Patient identified mother; friends as part of his support system. Patient identified the quality of these relationships as good.      Patient's current living/housing situation involves staying in own home/apartment.  The immediate members of family and household include Leticia Bailey, 54,Mother and he reports that housing is stable.    Patient is currently employed fulltime. Patient reports that his finances are obtained through employment. Patient does identify finances as a current stressor.      Patient reported that he has not been involved with the legal system. Patient does not report being under probation/ parole/ jurisdiction.     Patient's Strengths and Limitations:  Patient identified the following strengths or resources that will help him to succeed in treatment: commitment to health and well being, friends / good social support, family support, insight, intelligence, positive work environment, and motivation. Things that may interfere with the patient's success in treatment include: none identified.     Assessments:  The following assessments were completed by patient for this visit:  PHQ9:       6/18/2021     2:53 PM 1/3/2022     3:09 PM 3/6/2024     2:00 PM 4/4/2024     9:39 PM 7/3/2024     9:00 AM 8/29/2024     8:17 AM 9/10/2024     6:02 AM   PHQ-9 SCORE   [facility] (Moderately severe depression)   5 (Mild depression)  5 (Mild depression) 8 (Mild depression)   PHQ-9 Total Score 15 6 5 5 5 5 8     GAD7:       3/6/2024     2:00 PM 4/4/2024     9:40 PM 8/29/2024     8:18 AM   AMAURY-7 SCORE   Total Score  4 (minimal anxiety) 4 (minimal anxiety)   Total Score 10 4 4     CAGE-AID:       8/29/2024     8:18 AM 8/29/2024     8:19 AM   CAGE-AID Total Score   Total Score     Total Score MyChart          Information is confidential and restricted. Go to Review Flowsheets to unlock data.     PROMIS 10-Global Health (all questions and answers displayed):       5/6/2022     7:47 AM 9/5/2022     8:09 AM 12/8/2022     7:17 AM 3/6/2024     2:00 PM 7/2/2024     9:54 PM 8/29/2024     8:18 AM 9/10/2024     6:16 AM   PROMIS 10   In general, would you say your health is:       Good   In general, would you say your quality of life is:       Good   In general, how would you rate your physical health?       Good   In general, how would you rate your mental health, including your mood and your ability to think?       Fair   In general, how would you rate your satisfaction with your social activities and relationships?       Fair   In general, please rate how well you carry out your usual social activities and roles       Good   To what extent are you able to carry out your everyday physical activities such as walking, climbing stairs, carrying groceries, or moving a chair?       Completely   In the past 7 days, how often have you been bothered by emotional problems such as feeling anxious, depressed, or irritable?       Always   In the past 7 days, how would you rate your fatigue on average?       None   In the past 7 days, how would you rate your pain on average, where 0 means no pain, and 10 means worst imaginable pain?       0   In general, would you say your health is:       3   In general, would you say your quality of life is:       3   In general, how would you rate your physical health?       3   In general, how would you rate your mental health, including your mood and your ability to think?       2   In general, how would you rate your satisfaction with your social activities and relationships?       2   In general, please rate how well you carry out your usual social activities and roles. (This includes activities at home, at work and in your community, and responsibilities as a parent, child, spouse, employee, friend,  etc.)       3   To what extent are you able to carry out your everyday physical activities such as walking, climbing stairs, carrying groceries, or moving a chair?       5   In the past 7 days, how often have you been bothered by emotional problems such as feeling anxious, depressed, or irritable?       5   In the past 7 days, how would you rate your fatigue on average?       1   In the past 7 days, how would you rate your pain on average, where 0 means no pain, and 10 means worst imaginable pain?       0   Global Mental Health Score       8   Global Physical Health Score       18   PROMIS TOTAL - SUBSCORES       26       Information is confidential and restricted. Go to Review Flowsheets to unlock data.     PROMIS 10-Global Health (only subscores and total score):       5/6/2022     7:47 AM 9/5/2022     8:09 AM 12/8/2022     7:17 AM 3/6/2024     2:00 PM 7/2/2024     9:54 PM 8/29/2024     8:18 AM 9/10/2024     6:16 AM   PROMIS-10 Scores Only   Global Mental Health Score       8   Global Physical Health Score       18   PROMIS TOTAL - SUBSCORES       26       Information is confidential and restricted. Go to Review Flowsheets to unlock data.     [place] Suicide Severity Rating Scale (Lifetime/Recent)       No data to display              [place] Suicide Severity Rating Scale (Short Version)       No data to display                Personal and Family Medical History:  Patient does report a family history of mental health concerns. Patient reports family history includes Bipolar Disorder in his maternal grandfather and mother; Breast Cancer in his paternal grandmother; Coronary Stenting in his paternal grandmother; Diabetes Type 1 in his father; Diabetes Type 2  in his maternal grandfather and paternal grandmother; Hyperlipidemia in his father; Skin Cancer in his father and maternal grandfather; Suicide in his cousin.    Patient does report Mental Health Diagnosis and/or Treatment.  Patient reported the following  previous diagnoses which include(s): ADHD; an anxiety disorder; a bipolar disorder; depression; an eating disorder . Patient reported that his symptoms began in childhood/adolescence. Patient has received mental health services in the past:  therapy; psychiatry  .  Psychiatric Hospitalizations: none. Patient denies a history of civil commitment. Currently, patient is not receiving other mental health services.      Patient has had a physical exam to rule out medical causes for current symptoms. Date of last physical exam was within the past year. Client was encouraged to follow up with PCP if symptoms were to develop. The patient has a [facility] Provider, who is named William Ramos. Patient reports no current medical concerns.  Patient denies any issues with pain. There are significant appetite / nutritional concerns / weight changes.  Patient does not report a history of head injury / trauma / cognitive impairment.      Patient reports current meds as:   Current Outpatient Medications   Medication Sig Dispense Refill    amphetamine-dextroamphetamine (ADDERALL XR) 10 MG 24 hr capsule Take 2 capsules (20 mg) by mouth daily 60 capsule 0    amphetamine-dextroamphetamine (ADDERALL XR) 20 MG 24 hr capsule Take 1 capsule (20 mg) by mouth daily for 30 days 30 capsule 0    COMPOUNDED NON-CONTROLLED SUBSTANCE (CMPD RX) - PHARMACY TO MIX COMPOUNDED MEDICATION Semaglutide 1mg subcutaneously every 7 days 1 mL 1    lithium ER (LITHOBID) 300 MG CR tablet Take 2 tablets (600 mg) by mouth daily 180 tablet 0    sertraline (ZOLOFT) 50 MG tablet Take 1 tablet (50 mg) by mouth daily 90 tablet 0     No current facility-administered medications for this visit.       Medication Adherence:  Patient reports   taking prescribed medications as prescribed.    Patient Allergies:  No Known Allergies    Medical History:    Past Medical History:   Diagnosis Date    Elbow fracture, right          Current Mental Status Exam:    Appearance:  Appropriate    Eye Contact:  Good   Psychomotor:  Normal       Gait / station:  no problem  Attitude / Demeanor: Cooperative  Interested Attentive  Speech      Rate / Production: Normal/ Responsive      Volume:  Normal  volume      Language:  intact, no problems, and good  Mood:   Anxious  Normal  Affect:   Appropriate    Thought Content: Clear   Thought Process: Coherent  Logical       Associations: No loosening of associations  Insight:   Good   Judgment:  Intact   Orientation:  All  Attention/concentration: Good    Substance Use:   Patient did not report a family history of substance use concerns; see medical history section for details.  Patient has not received chemical dependency treatment in the past.  Patient has not ever been to detox.      Patient is not currently receiving any chemical dependency treatment.           Substance History of use Age of first use Date of last use     Pattern and duration of use (include amounts and frequency)   Alcohol never used     REPORTS SUBSTANCE USE: N/A   Cannabis   never used   REPORTS SUBSTANCE USE: N/A     Amphetamines   currently use  09/10/24 Reports use as prescribed   Cocaine/crack    never used   REPORTS SUBSTANCE USE: N/A   Hallucinogens used in the past   21  04/19/24  REPORTS SUBSTANCE USE: N/A   Inhalants never used     REPORTS SUBSTANCE USE: N/A   Heroin never used     REPORTS SUBSTANCE USE: N/A   Other Opiates never used   REPORTS SUBSTANCE USE: N/A   Benzodiazepine   never used   REPORTS SUBSTANCE USE: N/A   Barbiturates never used   REPORTS SUBSTANCE USE: N/A   Over the counter meds never used   REPORTS SUBSTANCE USE: N/A   Caffeine currently use 20  Reports occasional use    Nicotine  never used   REPORTS SUBSTANCE USE: N/A   Other substances not listed above:  Identify:  never used   REPORTS SUBSTANCE USE: N/A     Patient reported the following problems as a result of their substance use: no problems, not applicable.    Substance Use: No  symptoms    Based on the negative CAGE score and clinical interview there  are not indications of drug or alcohol abuse.    Significant Losses / Trauma / Abuse / Neglect Issues:   Patient did not  serve in the .  There are indications or report of significant loss, trauma, abuse or neglect issues related to:  patient reports a history of childhood poverty, and that his father struggled with substance abuse, which impacted the household dynamics while he was growing up .  Concerns for possible neglect are not present.     Safety Assessment:   Patient denies current homicidal ideation and behaviors.  Patient denies current self-injurious ideation and behaviors.    Patient denied risk behaviors associated with substance use.   Patient denies any high risk behaviors associated with mental health symptoms.  Patient reports the following current concerns for their personal safety: None.  Patient reports there are firearms in the house.  yes, they are secured.     History of Safety Concerns:  Patient denied a history of homicidal ideation.     Patient denied a history of personal safety concerns.    Patient denied a history of assaultive behaviors.    Patient denied a history of sexual assault behaviors.     Patient denied a history of risk behaviors associated with substance use.  Patient denies any history of high risk behaviors associated with mental health symptoms.  Patient reports the following protective factors: forward or future oriented thinking; dedication to family or friends; safe and stable environment; effectively controls impulses; sense of belonging; purpose; adherence with prescribed medication; daily obligations; effective problem solving skills; commitment to well being; sense of meaning; positive social skills; healthy fear of risky behaviors or pain; sense of personal control or determination; access to a variety of clinical interventions and pets    Risk Plan:  See Recommendations for Safety  and Risk Management Plan    Review of Symptoms per patient report:   Depression: Change in sleep, Difficulties concentrating, Low self-worth, and Ruminations  Maria M:  Elevated mood, Grandiosity, Decreased need for sleep, Distractibility, and Impulsiveness  Psychosis: No Symptoms  Anxiety: Excessive worry, Nervousness, Physical complaints, such as headaches, stomachaches, muscle tension, Sleep disturbance, Ruminations, Poor concentration, and Irritability  Panic:  No symptoms  Post Traumatic Stress Disorder:  No Symptoms   Eating Disorder: No Symptoms  ADD / ADHD:  Inattentive, Difficulties listening, Poor task completion, Poor organizational skills, Distractibility, Forgetful, Interrupts, Intrudes, Impulsive, Restlessness/fidgety, Hyperverbal, and Hyperactive  Conduct Disorder: No symptoms  Autism Spectrum Disorder: No symptoms  Obsessive Compulsive Disorder: No Symptoms    Patient reports the following compulsive behaviors and treatment history:  none reported .      Diagnostic Criteria:   Generalized Anxiety Disorder  A. Excessive anxiety and worry about a number of events or activities (such as work or school performance).   B. The person finds it difficult to control the worry.  C. Select 3 or more symptoms (required for diagnosis). Only one item is required in children.   - Restlessness or feeling keyed up or on edge.    - Being easily fatigued.    - Difficulty concentrating or mind going blank.    - Irritability.    - Muscle tension.    - Sleep disturbance (difficulty falling or staying asleep, or restless unsatisfying sleep).   D. The focus of the anxiety and worry is not confined to features of an Axis I disorder.  E. The anxiety, worry, or physical symptoms cause clinically significant distress or impairment in social, occupational, or other important areas of functioning.   F. The disturbance is not due to the direct physiological effects of a substance (e.g., a drug of abuse, a medication) or a general  medical condition (e.g., hyperthyroidism) and does not occur exclusively during a Mood Disorder, a Psychotic Disorder, or a Pervasive Developmental Disorder.    - The aformentioned symptoms began in childhood/adolescence, occurs most days per week, and is experienced as mild to moderate.    Bipolar I Manic Episode  MANIC EPISODE - At least one lifetime manic episode is required for the dx of Bipolar I Disorder as evidenced by present symptoms or by history  A. A distinct period of abnormally and persistently elevated, expansive, or irritable mood, lasting at least 1 week (or any duration if hospitalization is necessary).   B. During the period of mood disturbance, three (or more) of the following symptoms (four if the mood is only irritable) have persisted and have been present to a significant degree:   - inflated self-esteem or grandiosity    - decreased need for sleep (e.g., feels rested after only 3 hours of sleep)    - flight of ideas or subjectivie experience that thoughts are racing   - distractibility   - increase in goal-directed activity  C. The mood disturbance is sufficiently severe to cause marked impairment in social or occupational functioning or to necessitate hospitalization to prevent harm to self or others, or there are severe psychotic features  D. The symptoms are not attributable to the physiologicial effects of a substance or to another medical condition  E. The episode is sufficiently severe enough to cause marked impairment in social or occupational functioning or to necessitate hospitalization to prevent harm to self or others, or there are psychotic features  F. The symptoms are not due to the direct physiological effects of a substance (eg, a drug of abuse, a medication, or other treatment) or a general medical condition (eg, hyperthyroidism).    - The aformentioned symptoms began in young adulthood, have been in remission since 2020, and is experienced as moderate to severe.    Attention  Deficit Hyperactivity Disorder  A) A persistent pattern of inattention and/or hyperactivity-impulsivity that interferes with functioning or development, as characterized by (1) Inattention and/or (2) Hyperactivity and Impulsivity  (1) Inattention: 6 or more of the following symptoms have persisted for at least 6 months to a degree that is inconsistent with developmental level and that negatively impacts directly on social and academic/occupational activities:  - Often fails to give close attention to details or makes careless mistakes in schoolwork, at work, or during other activities  - Often has difficulty sustaining attention in tasks or play activities  - Often does not seem to listen when spoken to directly  - Often does not follow through on instructions and fails to finish schoolwork, chores, or duties in the workplace  - Often has difficulty organizing tasks and activities  - Often avoids, dislikes, or is reluctant to engage in tasks that require sustained mental effort  - Often loses things necessary for tasks or activities  - Is often easily distractedby extraneous stimuli  - Is often forgetful in daily activities  (2) Hyeractivity and Impulsivity: 6 or more of the following symptoms have persisted for at least 6 months to a degree that is inconsistent with developmental level and that negatively impacts directly on social and academic/occupational activities:  - Often fidgets with or taps hands or feet or squirms in seat  - Often leaves seat in situations when remaining seated is expected  B) Several inattentive or hyperactive-impulsive symptoms were present prior to age 12 years  C) Several inattentive or hyperactive-impulsive symptoms are present in two or more settings  D) There is clear evidence that the symptoms interfere with, or reduce the quality of, social academic, or occupational functioning  E) The Symptoms do not occur exclusively during the course of schizophrenia or another psychotic  disorder and are not better explained by another mental disorder    Functional Status:  Patient reports the following functional impairments:  health maintenance, management of the household and or completion of tasks, organization, relationship(s), self-care, social interactions, and work / vocational responsibilities.     Nonprogrammatic care:  Patient is requesting basic services to address current mental health concerns.    Clinical Summary:  1. Psychosocial, Cultural and Contextual Factors: Patient reports a history of challenges with mood and anxiety symptoms, and well as focus and concentration. He also reports a history of disordered eating patterns, all of which has impacted his mental health and functioning. Patient reports that he will returning to school this year, and also works full-time. Additionally, patient reports that his family lives in another part of the country, and notes that it is sometimes difficult to live so far away from one another. He reports that his childhood was difficult at times, as the family lived in poverty, and his father struggled with substance use. He notes that he has good relationships with family overall at this time.  2. Principal DSM5 Diagnoses  (Sustained by DSM5 Criteria Listed Above):   Attention-Deficit/Hyperactivity Disorder  314.01 (F90.2) Combined presentation  296.52 Bipolar I Disorder Current or Most Recent Episode Depressed, Moderate  300.02 (F41.1) Generalized Anxiety Disorder.  3. Other Diagnoses that is relevant to services:   N/A  4. Provisional Diagnosis:  N/A  5. Prognosis: Return to Baseline Functioning, Expect Improvement, Relieve Acute Symptoms, and Maintain Current Status / Prevent Deterioration.  6. Likely consequences of symptoms if not treated: If the reported symptoms and presenting concerns are not treated, it would be anticipated that they would persist, potentially increasing in frequency and severity, and further impacting patient's mental  health, functioning, and general sense of well-being.  7. Client strengths include:  employed, goal-focused, has a previous history of therapy, insightful, intelligent, motivated, open to learning, open to suggestions / feedback, and support of family, friends and providers .     Recommendations:     1. Plan for Safety and Risk Management:   Safety and Risk: Recommended that patient call 911 or go to the local ED should there be a change in any of these risk factors..          Report to child / adult protection services was NA.     2. Patient's identified mental health concerns with a cultural influence will be addressed by having a conversation with patient at the start of services in regard to how to best address any potential concerns that might come up in therapy in a manner that feels both respectful and culturally appropriate .     3. Initial Treatment will focus on:    Depressed Mood - decreasing frequency and severity of symptoms  Anxiety - decreasing frequency and severity of symptoms  ADHD - further developing coping skills for management of symptoms .     4. Resources/Service Plan:    services are not indicated.   Modifications to assist communication are not indicated.   Additional disability accommodations are not indicated.      5. Collaboration:   Collaboration / coordination of treatment will be initiated with the following  support professionals:  none indicated at this time, based upon the information reported at the time of this assessment .      6.  Referrals:   The following referral(s) will be initiated:  none indicated based upon the information reported at the time of this assessment .       A Release of Information has been obtained for the following:  N/A .     Clinical Substantiation/medical necessity for the above recommendations:  Based upon the information reported and known at the time of this assessment, it is being recommended that patient participate in individual therapy  services, and continue to access medication management services as well. These services are deemed medically necessary to assist patient in effectively addressing the reported symptoms and presenting concerns, as well as to assist with improving mental health, functioning, and general sense of well-being.     7. BRITTNY:    BRITTNY:  Discussed the general effects of drugs and alcohol on health and well-being. Provider gave patient printed information about the effects of chemical use on their health and well being. Recommendations:  none, based upon the information reported at the time of this assessment.     8. Records:   These were reviewed at time of assessment.   Information in this assessment was obtained from the medical record and  provided by patient who is a good historian.    Patient will have open access to their mental health medical record.    9.   Interactive Complexity: No    10. Safety Plan: Based upon the information known and reported at the time of this assessment, there are no identified safety concerns, and no need for a safety plan at this time.    Provider Name/ Credentials:  TROY Elise, [facility]  September 10, 2024

## 2024-09-11 LAB
AMPHET UR-MCNC: 4252 NG/ML
MDA UR-MCNC: <200 NG/ML
MDEA UR-MCNC: <200 NG/ML
MDMA UR-MCNC: <200 NG/ML
METHAMPHET UR-MCNC: <200 NG/ML
PHENTERMINE UR CFM-MCNC: <200 NG/ML

## 2024-09-12 RX ORDER — DEXTROAMPHETAMINE SACCHARATE, AMPHETAMINE ASPARTATE MONOHYDRATE, DEXTROAMPHETAMINE SULFATE AND AMPHETAMINE SULFATE 5; 5; 5; 5 MG/1; MG/1; MG/1; MG/1
20 CAPSULE, EXTENDED RELEASE ORAL DAILY
Qty: 30 CAPSULE | Refills: 0 | Status: SHIPPED | OUTPATIENT
Start: 2024-09-12

## 2024-09-12 NOTE — TELEPHONE ENCOUNTER
Called [facility] in [place] and cancelled prescription amphetamine-dextroamphetamine (ADDERALL XR) 20 MG 24 hr capsule     AVELINA DENNIS RN on 9/12/2024 at 9:59 AM

## 2024-09-13 ENCOUNTER — OFFICE VISIT (OUTPATIENT)
Dept: SLEEP MEDICINE | Facility: CLINIC | Age: 29
End: 2024-09-13
Payer: COMMERCIAL

## 2024-09-13 VITALS
WEIGHT: 215 LBS | OXYGEN SATURATION: 98 % | SYSTOLIC BLOOD PRESSURE: 116 MMHG | HEIGHT: 72 IN | HEART RATE: 84 BPM | BODY MASS INDEX: 29.12 KG/M2 | DIASTOLIC BLOOD PRESSURE: 78 MMHG

## 2024-09-13 DIAGNOSIS — G47.33 OSA (OBSTRUCTIVE SLEEP APNEA): Primary | ICD-10-CM

## 2024-09-13 PROCEDURE — 99213 OFFICE O/P EST LOW 20 MIN: CPT

## 2024-09-13 NOTE — PROGRESS NOTES
Sleep Apnea - Follow-up Visit:    Impression/Plan:  (G47.33) CARLOS (obstructive sleep apnea) (primary encounter diagnosis)  Comment: Regino presents for follow-up of his mild sleep apnea, currently untreated. He received a CPAP on 12/19/2022. He thinks he stopped using his machine in July 2023 after a move. When he used CPAP it was working very well, and he felt much more rested with CPAP use. His machine was returned on 07/16/2024 due to insurance issues. He would like to get restarted on CPAP therapy.    Plan: Comprehensive DME  An order was placed for Auto-PAP 5-15 cmH2O. We reviewed compliance goals, mask exchange policy, and patient was enrolled in [facility].     Lux Bailey will follow up in about 3 month(s) to document compliance with his new machine. If a compliance visit isn't needed, he can follow up on a yearly basis.      Total time spent reviewing medical records, history and physical examination, review of previous testing and interpretation as well as documentation on this date: 11 minutes    CC: William Ramos NP    History of Present Illness:  Chief Complaint   Patient presents with    CPAP Follow Up     Lux Bailey presents for follow-up of his mild sleep apnea, currently untreated. He received a CPAP on 12/19/2022. He thinks he stopped using his machine in July 2023 after a move. When he used CPAP it was working very well, and he felt much more rested with CPAP use. His machine was returned on 07/16/2024 due to insurance issues. He would like to get restarted on CPAP therapy.      HST on 09/24/2022 at 232 lbs.- pAHI 10.9 events per hour, REM pAHI 18.3 events per hour. Sleep associated hypoxemia was not present.     DME: [facility]    Do you use a CPAP Machine at home: No    He was using a nasal pillows mask.     EPWORTH SLEEPINESS SCALE         9/12/2024    11:03 PM    [place] Sleepiness Scale ( OLEKSANDR Webster  [id number]<br>ESS - [place]/English - Final version - 21 Nov 07 - [facility] [place].)    Sitting and reading Moderate chance of dozing   Watching TV High chance of dozing   Sitting, inactive in a public place (e.g. a theatre or a meeting) Would never doze   As a passenger in a car for an hour without a break High chance of dozing   Lying down to rest in the afternoon when circumstances permit High chance of dozing   Sitting and talking to someone Would never doze   Sitting quietly after a lunch without alcohol Would never doze   In a car, while stopped for a few minutes in traffic Would never doze   [place] Score (MC) 11   [place] Score (Sleep) 11       INSOMNIA SEVERITY INDEX (YOGESH)          9/12/2024    11:02 PM   Insomnia Severity Index (YOGESH)   Difficulty falling asleep 0   Difficulty staying asleep 2   Problems waking up too early 2   How SATISFIED/DISSATISFIED are you with your CURRENT sleep pattern? 3   How NOTICEABLE to others do you think your sleep problem is in terms of impairing the quality of your life? 4   How WORRIED/DISTRESSED are you about your current sleep problem? 1   To what extent do you consider your sleep problem to INTERFERE with your daily functioning (e.g. daytime fatigue, mood, ability to function at work/daily chores, concentration, memory, mood, etc.) CURRENTLY? 2   YOGESH Total Score 14       Guidelines for Scoring/Interpretation:  Total score categories:  0-7 = No clinically significant insomnia   8-14 = Subthreshold insomnia   15-21 = Clinical insomnia (moderate severity)  22-28 = Clinical insomnia (severe)  Used via courtesy of www.SCHADealth.va.gov with permission from Nigel Zepeda PhD., [facility]      Past medical/surgical history, family history, social history, medications and allergies were reviewed.        Problem List:  Patient Active Problem List    Diagnosis Date Noted    Class 1 obesity due to excess calories without serious comorbidity with body mass index (BMI) of 31.0 to 31.9 in adult 07/09/2024     Priority: Medium    Bipolar I disorder (H)  12/11/2023     Priority: Medium        /78   Pulse 84   Ht 1.829 m (6')   Wt 97.5 kg (215 lb)   SpO2 98%   BMI 29.16 kg/m      ISACC Jc CNP

## 2024-09-13 NOTE — PATIENT INSTRUCTIONS
I placed the order for a CPAP machine. That routes electronically to [facility]. They will contact you in the next 2-3 weeks to schedule an appointment to get the device.     A few things to know about starting CPAP:  CPAP machines are often rent-to-own over 3-12 months depending on your insurance. During the first 3 months, insurances will often want to see at least 4 hours of use on 70% of nights over a 30 day period. Ideally, you would wear it whenever sleeping, but 4 hours is where health benefits really start.   If you don't like the first mask you get, you can exchange it once in the first month for free. Otherwise, insurance will cover new supplies about every 3 months. They will give you paperwork explaining how often to clean and replace parts when you get the machine.  We have people called our sleep therapy management team who will be checking in on you in the first month. They can access data from your machine and help troubleshoot any problems you may have.    Insurances sometimes require a follow up in the 2-3 month range. Please call [phone] to schedule.    ISACC Jc CNP

## 2024-09-13 NOTE — NURSING NOTE
Chief Complaint   Patient presents with    CPAP Follow Up       Initial /78   Pulse 84   Ht 1.829 m (6')   Wt 97.5 kg (215 lb)   SpO2 98%   BMI 29.16 kg/m   Estimated body mass index is 29.16 kg/m  as calculated from the following:    Height as of this encounter: 1.829 m (6').    Weight as of this encounter: 97.5 kg (215 lb).    Medication Reconciliation: complete    Neck circumference: 17 inches / 43 centimeters.    DME: [facility]

## 2024-09-16 ENCOUNTER — DOCUMENTATION ONLY (OUTPATIENT)
Dept: SLEEP MEDICINE | Facility: CLINIC | Age: 29
End: 2024-09-16
Payer: COMMERCIAL

## 2024-09-16 DIAGNOSIS — G47.33 OSA (OBSTRUCTIVE SLEEP APNEA): Primary | ICD-10-CM

## 2024-09-16 DIAGNOSIS — G47.14 SLEEP DISORDER DUE TO A GENERAL MEDICAL CONDITION, HYPERSOMNIA TYPE: ICD-10-CM

## 2024-09-16 NOTE — PROGRESS NOTES
Patient was offered choice of vendor and chose [facility].  Patient Lux Bailey was set up at [facility] on September 16, 2024. Patient received a Resmed Airsense 11 Pressures were set at  5-15 cm H2O.   Patient s ramp is 5 cm H2O for Auto and FLEX/EPR is EPR, 2.  Patient received a Resmed Mask name: AIRFIT P10  Pillow mask size Medium, heated tubing and heated humidifier.  Patient has the following compliance requirements: using and visit requirements  Patient has a follow up on 12/26/2024 with CLIFF Chang.    Lena Ulrich

## 2024-09-19 ENCOUNTER — DOCUMENTATION ONLY (OUTPATIENT)
Dept: SLEEP MEDICINE | Facility: CLINIC | Age: 29
End: 2024-09-19
Payer: COMMERCIAL

## 2024-09-19 NOTE — PROGRESS NOTES
3 day Sleep therapy management telephone visit    Diagnostic AHI:     WatchPat:10.9       SUBJECTIVE: Patient reports doing well with CPAP. He has some irration from the mask digging in. Will send mask fit advice  Patient was given my contact information and instructed to reach out with any questions or concerns.       Order settings:  CPAP MIN CPAP MAX   5 cm H2O 15 cm H2O         Device settings:  CPAP MIN CPAP MAX EPR RESMED SOFT RESPONSE SETTING   5.0 cm  H20 15.0 cm  H20 TWO OFF         Patient has the following upcoming sleep appts:  Future Sleep Appointments         Provider Department    12/26/2024 8:30 AM (Arrive by 8:15 AM) Nancy Hills, APRN CNP [facility] [place]            Replacement device: No  STM ordered by provider: Yes     Total time spent on accessing and  interpreting remote patient PAP therapy data  10 minutes    Total time spent counseling, coaching  and reviewing PAP therapy data with patient  2 minutes

## 2024-09-23 ENCOUNTER — DOCUMENTATION ONLY (OUTPATIENT)
Dept: SLEEP MEDICINE | Facility: CLINIC | Age: 29
End: 2024-09-23
Payer: COMMERCIAL

## 2024-09-23 NOTE — PROGRESS NOTES
3 day Sleep therapy management telephone visit    Diagnostic AHI:  WatchPat:10.9    Patient already had 3 day call.

## 2024-09-24 ENCOUNTER — OFFICE VISIT (OUTPATIENT)
Dept: PHARMACY | Facility: CLINIC | Age: 29
End: 2024-09-24
Payer: COMMERCIAL

## 2024-09-24 VITALS — WEIGHT: 214 LBS | BODY MASS INDEX: 29.02 KG/M2 | DIASTOLIC BLOOD PRESSURE: 84 MMHG | SYSTOLIC BLOOD PRESSURE: 128 MMHG

## 2024-09-24 DIAGNOSIS — E66.09 CLASS 1 OBESITY DUE TO EXCESS CALORIES WITHOUT SERIOUS COMORBIDITY WITH BODY MASS INDEX (BMI) OF 31.0 TO 31.9 IN ADULT: Primary | ICD-10-CM

## 2024-09-24 DIAGNOSIS — E66.811 CLASS 1 OBESITY DUE TO EXCESS CALORIES WITHOUT SERIOUS COMORBIDITY WITH BODY MASS INDEX (BMI) OF 31.0 TO 31.9 IN ADULT: Primary | ICD-10-CM

## 2024-09-24 PROCEDURE — 99606 MTMS BY PHARM EST 15 MIN: CPT | Performed by: PHARMACIST

## 2024-09-24 PROCEDURE — 99607 MTMS BY PHARM ADDL 15 MIN: CPT | Performed by: PHARMACIST

## 2024-09-24 NOTE — PATIENT INSTRUCTIONS
"Recommendations from today's MTM visit:                                                         Congrats on the weight loss so far!    Continue semaglutide 1mg every 7 days - reach out if you need a dose adjustment    Follow-up:  January    It was great speaking with you today.  I value your experience and would be very thankful for your time in providing feedback in our clinic survey. In the next few days, you may receive an email or text message from [facility] with a link to a survey related to your  clinical pharmacist.\"     To schedule another MTM appointment, please call the clinic directly or you may call the MTM scheduling line at [phone] or toll-free at [phone].     My Clinical Pharmacist's contact information:                                                      Please feel free to contact me with any questions or concerns you have.      Stacia Reyes, PharmD, [facility]   Medication Therapy Management Pharmacist   [facility]  [phone]    "

## 2024-10-01 ENCOUNTER — DOCUMENTATION ONLY (OUTPATIENT)
Dept: SLEEP MEDICINE | Facility: CLINIC | Age: 29
End: 2024-10-01
Payer: COMMERCIAL

## 2024-10-01 NOTE — PROGRESS NOTES
14 day Sleep therapy management telephone visit    Diagnostic AHI:     WatchPat:10.9 events per hour       SUBJECTIVE: Patient reports doing ok with CPAP. He is sometimes taking it off inadvertently. He is interested in a new mask and will contact [facility].  Patient was given my contact information and instructed to reach out with any questions or concerns.       Objective measures: 14 day rolling measures   COMPLIANCE LEAK AHI AVERAGE USE IN MINUTES   42 % 0 3.77 203   GOAL >70% GOAL < 24 LPM GOAL <5 GOAL >240        Device settings:  CPAP MIN CPAP MAX EPR RESMED SOFT RESPONSE SETTING   5.0 cm  H20 15.0 cm  H20 TWO OFF         Patient has the following upcoming sleep appts:  Future Sleep Appointments         Provider Department    12/26/2024 8:30 AM (Arrive by 8:15 AM) Nancy Hills, ISACC [facility] [place]            Replacement device: No  STM ordered by provider: Yes     Total time spent on accessing and  interpreting remote patient PAP therapy data  10 minutes    Total time spent counseling, coaching  and reviewing PAP therapy data with patient  2 minutes

## 2024-10-07 ENCOUNTER — VIRTUAL VISIT (OUTPATIENT)
Dept: PSYCHOLOGY | Facility: CLINIC | Age: 29
End: 2024-10-07
Payer: COMMERCIAL

## 2024-10-07 DIAGNOSIS — F31.9 BIPOLAR I DISORDER (H): Primary | ICD-10-CM

## 2024-10-07 DIAGNOSIS — F90.2 ADHD (ATTENTION DEFICIT HYPERACTIVITY DISORDER), COMBINED TYPE: ICD-10-CM

## 2024-10-07 DIAGNOSIS — F41.1 GENERALIZED ANXIETY DISORDER: ICD-10-CM

## 2024-10-07 PROCEDURE — 90834 PSYTX W PT 45 MINUTES: CPT | Mod: 95 | Performed by: SOCIAL WORKER

## 2024-10-10 NOTE — PROGRESS NOTES
[facility]                                     Progress Note    Patient Name: Lux Bailey  Date: 10/7/2024         Service Type: Individual      Session Start Time: 1:02pm  Session End Time: 1:50pm     Session Length: 48 minutes    Session #: 2    Attendees: Client    Service Modality:  Video Visit:      Provider verified identity through the following two step process.  Patient provided:  Patient is known previously to provider and Patient was verified at admission/transfer    Telemedicine Visit: The patient's condition can be safely assessed and treated via synchronous audio and visual telemedicine encounter.      Reason for Telemedicine Visit: Patient has requested telehealth visit    Originating Site (Patient Location): Patient's home    Distant Site (Provider Location): [facility]: [place]    Consent:  The patient/guardian has verbally consented to: the potential risks and benefits of telemedicine (video visit) versus in person care; bill my insurance or make self-payment for services provided; and responsibility for payment of non-covered services.     Patient would like the video invitation sent by:  Send to e-mail at: [email or web address]    Mode of Communication:  Video Conference via [facility]    Distant Location (Provider):  On-site    As the provider I attest to compliance with applicable laws and regulations related to telemedicine.    DATA  Interactive Complexity: No  Crisis: No        Progress Since Last Session (Related to Symptoms / Goals / Homework):   Symptoms: No change Patient does not report any significant changes to the reported symptoms and presenting concerns since the time of the previous diagnostic assessment appointment.    Homework: N/A--previous appointment was a diagnostic assessment, so no homework had been assigned.       Episode of Care Goals: N/A--treatment plan goals and objectives were developed during today's appointment.       Current / Ongoing Stressors and Concerns:   Patient reports a history of challenges with mood and anxiety symptoms, and well as focus and concentration. He also reports a history of disordered eating patterns, all of which has impacted his mental health and functioning. Patient reports that he will returning to school this year, and also works full-time. Additionally, patient reports that his family lives in another part of the country, and notes that it is sometimes difficult to live so far away from one another. He reports that his childhood was difficult at times, as the family lived in poverty, and his father struggled with substance use. He notes that he has good relationships with family overall at this time. Patient does not report any significant changes to the reported symptoms and presenting concerns since the time of his previous appointment. He notes that the medication he was prescribed appears to be helping with his weight loss journey, and his is feeling good about this. He notices that he tends to engage in binge eating habits when his anxiety is increased. Patient reports also noticing how his ADHD symptoms have been impacting his mental health and functioning, and would like to further develop coping skills.      Treatment Objective(s) Addressed in This Session:   N/A--treatment plan goals and objectives were developed during today's appointment.      Intervention:   Motivational Interviewing    MI Intervention: Expressed Empathy/Understanding, Supported Autonomy, Collaboration, Evocation, Open-ended questions, Reflections: simple and complex, Change talk (evoked), and Reframe     Change Talk Expressed by the Patient: Desire to change Ability to change Reasons to change Need to change Committment to change Activation Taking steps    Provider Response to Change Talk: E - Evoked more info from patient about behavior change, A - Affirmed patient's thoughts, decisions, or attempts at behavior  change, R - Reflected patient's change talk, and S - Summarized patient's change talk statements    Psychodynamic: This writer used open ended questions and reflective listening to assist patient in processing his thoughts and emotions as related to the reported symptoms and presenting concerns.     Assessments completed prior to visit:  The following assessments were completed by patient for this visit:  PHQ9:       6/18/2021     2:53 PM 1/3/2022     3:09 PM 3/6/2024     2:00 PM 4/4/2024     9:39 PM 7/3/2024     9:00 AM 8/29/2024     8:17 AM 9/10/2024     6:02 AM   PHQ-9 SCORE   PHQ-9 Total Score MyChart 15 (Moderately severe depression)   5 (Mild depression)  5 (Mild depression) 8 (Mild depression)   PHQ-9 Total Score 15 6 5 5 5 5 8     GAD7:       3/6/2024     2:00 PM 4/4/2024     9:40 PM 8/29/2024     8:18 AM   AMAURY-7 SCORE   Total Score  4 (minimal anxiety) 4 (minimal anxiety)   Total Score 10 4 4         ASSESSMENT: Current Emotional / Mental Status (status of significant symptoms):   Risk status (Self / Other harm or suicidal ideation)   Patient denies current fears or concerns for personal safety.   Patient denies current or recent suicidal ideation or behaviors.   Patient denies current or recent homicidal ideation or behaviors.   Patient denies current or recent self injurious behavior or ideation.   Patient denies other safety concerns.   Patient reports there has been no change in risk factors since their last session.     Patient reports there has been no change in protective factors since their last session.     Recommended that patient call 911 or go to the local ED should there be a change in any of these risk factors.     Appearance:   Appropriate    Eye Contact:   Good    Psychomotor Behavior: Normal    Attitude:   Cooperative  Interested Attentive   Orientation:   All   Speech    Rate / Production: Normal     Volume:  Normal    Mood:    Anxious  Normal   Affect:    Appropriate    Thought  "Content:  Clear    Thought Form:  Coherent  Logical    Insight:    Good      Medication Review:   No changes to current psychiatric medication(s)     Medication Compliance:   Yes     Changes in Health Issues:   None reported     Chemical Use Review:   Substance Use: Chemical use reviewed, no active concerns identified      Tobacco Use: No current tobacco use.      Diagnosis:  F31.9 Bi Polar I Disorder  F41.1 Generalized Anxiety Disorder  F90.2 Attention Deficit Hyperactivity Disorder, Combined Presentation     Collateral Reports Completed:   Not Applicable    PLAN: (Patient Tasks / Therapist Tasks / Other)  Between now and his next appointment, patient will track his symptoms to identify potential patterns, will practice utilizing CBT triangle introduced during today's session, and will also look into \"The CBT Workbook for Adult ADHD\"        Torrie De Leon, [facility]                                                         ______________________________________________________________________    Individual Treatment Plan    Patient's Name: Lux Bailey  YOB: 1995    Date of Creation: 10/7/2024  Date Treatment Plan Last Reviewed/Revised: 10/7/2024 (initial treatment plan)    DSM5 Diagnoses: Attention-Deficit/Hyperactivity Disorder  314.01 (F90.2) Combined presentation, 296.52 Bipolar I Disorder Current or Most Recent Episode Depressed, Moderate, or 300.02 (F41.1) Generalized Anxiety Disorder  Psychosocial / Contextual Factors: Patient reports a history of challenges with mood and anxiety symptoms, and well as focus and concentration. He also reports a history of disordered eating patterns, all of which has impacted his mental health and functioning. Patient reports that he will returning to school this year, and also works full-time. Additionally, patient reports that his family lives in another part of the country, and notes that it is sometimes difficult to live so far away from one another. He " "reports that his childhood was difficult at times, as the family lived in poverty, and his father struggled with substance use. He notes that he has good relationships with family overall at this time.   PROMIS (reviewed every 90 days): completed on 9/10/24 with a score of 26    Referral / Collaboration:  Referral to another professional/service is not indicated at this time..    Anticipated number of session for this episode of care:  18-24 sessions  Anticipation frequency of session: Every other week  Anticipated Duration of each session: 38-52 minutes  Treatment plan will be reviewed in 90 days or when goals have been changed.       MeasurableTreatment Goal(s) related to diagnosis / functional impairment(s)  Goal 1: Patient will further develop helpful coping skills to assist her with management of depression and anxiety symptoms, and practice using these skills at least 1-2 times daily, as reported by her, over 3 months' time.   \"I'll know I\"ve met my goal when I have specific skills for managing ADHD symptoms.\"      Objective #A (Patient Action)    Patient will identify at least 3-4 worries or thought patterns that contribute to feeling anxious or depressed.  Status: New - Date: 10/7/2024       Intervention(s)  Therapist will ask patient to track her symptoms, to identify any potential patterns with thoughts, emotions, triggers, situations/circumstances.     Objective #B  Patient will learn at least 3-4 new coping skills for anxiety and depression symptom management, and will practice using these skills at least 1-2 times daily.  Status: New - Date: 10/7/2024       Intervention(s)  Therapist will teach and practice mindfulness based and cognitive coping skills in session with patient, and will ask her to practice these skills in between appointments as well.       Goal 2: Patient will be able to effectively address thoughts and feelings related to binge eating disorder, at least 3-5 times weekly, as reported by " "him, over 3 months' time.     I will know I've met my goal when I'm able to address feelings of guilt related to binge eating, and to not be so hard on myself.\"     Objective #A (Patient Action)    Status: New - Date: 10/7/2024      Patient will identify 3-5 thought patterns which contribute to binge eating.    Intervention(s)  Therapist will  ask patient to track symptoms, to identify potential patterns with triggers, thought patterns, emotions, body cues, etc related to binge eating .    Objective #B  Patient will  be able to effectively use manage self-critical thoughts related to binge eating or body image, as they occur, and/or accept a compliment, at least 3-5 times weekly .    Status: New - Date: 10/7/2024      Intervention(s)  Therapist will assist patient in identifying self-affirming language and other strategies for management of self-critical thought patterns that resonate with him, and will ask him to practice using these skills outside of session.         Patient has reviewed and agreed to the above plan.      Torrie De Leon, [facility]  October 7, 2024   "

## 2024-10-14 ENCOUNTER — MYC MEDICAL ADVICE (OUTPATIENT)
Dept: PSYCHIATRY | Facility: CLINIC | Age: 29
End: 2024-10-14

## 2024-10-14 DIAGNOSIS — F90.0 ATTENTION DEFICIT HYPERACTIVITY DISORDER (ADHD), PREDOMINANTLY INATTENTIVE TYPE: ICD-10-CM

## 2024-10-14 NOTE — TELEPHONE ENCOUNTER
"Date of Last Office Visit: 8/29/24  Date of Next Office Visit:  12/2/24  No shows since last visit: No  More than one patient-initiated cancellation (with reschedule) since last seen in clinic? No    []Medication refilled per  Medication Refill in Ambulatory Care  policy.  [x]Medication unable to be refilled by RN due to criteria not met as indicated below:    []Eligibility: has not had a provider visit within last 6 months   []Supervision: no future appointment; < 7 days before next appointment   []Compliance: no shows; cancellations; lapse in therapy   []Verification: order discrepancy; may need modification...   [] > 30-day supply request   []Advanced refill request: > 7 days before refill date   [x]Controlled medication   []Medication not included in policy   []Review: new med; med adjusted ? 30 days; safety alert; requires lab monitoring...   []Scope of Practice: refill request processed by LPN/MA   []Other:      Medication(s) requested:     -  amphetamine-dextroamphetamine (ADDERALL XR) 20 MG 24 hr capsule   Date last ordered: 9/12/24  Qty: 30  Refills: 0  Appropriate for refill? Yes    Any Controlled Substance(s)? [place]; not current delegate      Requested medication(s) verified as identical to current order? Yes    Any lapse in adherence to medication(s) greater than 5 days? No      Additional action taken? cued up medication/order(s).      Last visit treatment plan:    -patient has a history \" depression, schizophrenia 2016 when starting college and ADHD 2017. Was initially diagnosed with schizophrenia and placed on antipsychotic for 2 years. In 2018 diagnosis changed from schizophrenia to bipolar disorder and placed on lamotrigine. Lamotrigine was not effective and transitioned to lithium at end of 2018. Mood has been stable with no manic episodes since starting lithium. Mood symptoms have been well managed at subtherapeutic lithium levels. He recalls he grew up in a family where \"mental " "illness wasn't real\" but struggled with depression starting in 1st or 2nd grade       Per patient statement today: He is feeling fairly stable on current medications.  Denies any marline or hypomania symptoms.  Depression and anxiety are manageable at this time.  Enjoys his job working as a .  He has an appointment to initiate psychotherapy care coming up soon  He reports compliance with current medications and denies side effects.   reviewed no notable issues     Patient appears to be tolerating current medications without any issues.  He reports focusing better on Adderall without any induction of marline or anxiety.  He is unsure whether he wants to continue with sertraline unsure whether he gets any additional benefit from being on it but wants to keep it for now and we will revisit this issue during her next appointment.  Patient will continue on current medications and return to the clinic approximately 12 weeks for follow-up appointment.  1.ADHD  : Continue Adderall 20 mg daily  2.  Bipolar-continue lithium 300 mg twice daily  3.  Depression-continue sertraline 50 mg daily-patient is considering weaning off in the next few months  4.  Highly recommend psychotherapy-has an upcoming appointment to initiate care  5.  High risk medication : baseline EKG ordered-Labs , lithium level ,basic metabolic panel, lipid panel, CBC with differential, hemoglobin A1c.  Baseline POC DM     Any medication(s) require lab monitoring? No              "

## 2024-10-15 ENCOUNTER — OFFICE VISIT (OUTPATIENT)
Dept: FAMILY MEDICINE | Facility: CLINIC | Age: 29
End: 2024-10-15
Payer: COMMERCIAL

## 2024-10-15 VITALS
TEMPERATURE: 98.2 F | BODY MASS INDEX: 29.39 KG/M2 | SYSTOLIC BLOOD PRESSURE: 126 MMHG | WEIGHT: 217 LBS | HEIGHT: 72 IN | HEART RATE: 80 BPM | DIASTOLIC BLOOD PRESSURE: 82 MMHG | OXYGEN SATURATION: 98 % | RESPIRATION RATE: 16 BRPM

## 2024-10-15 DIAGNOSIS — Z22.39 CARRIER OF UREAPLASMA UREALYTICUM: ICD-10-CM

## 2024-10-15 DIAGNOSIS — Z11.3 SCREENING EXAMINATION FOR STI: Primary | ICD-10-CM

## 2024-10-15 PROCEDURE — 99000 SPECIMEN HANDLING OFFICE-LAB: CPT | Performed by: FAMILY MEDICINE

## 2024-10-15 PROCEDURE — 87591 N.GONORRHOEAE DNA AMP PROB: CPT | Performed by: FAMILY MEDICINE

## 2024-10-15 PROCEDURE — 87491 CHLMYD TRACH DNA AMP PROBE: CPT | Performed by: FAMILY MEDICINE

## 2024-10-15 PROCEDURE — 87563 M. GENITALIUM AMP PROBE: CPT | Mod: 90 | Performed by: FAMILY MEDICINE

## 2024-10-15 PROCEDURE — 99213 OFFICE O/P EST LOW 20 MIN: CPT | Performed by: FAMILY MEDICINE

## 2024-10-15 PROCEDURE — 87798 DETECT AGENT NOS DNA AMP: CPT | Mod: 90 | Performed by: FAMILY MEDICINE

## 2024-10-15 RX ORDER — DEXTROAMPHETAMINE SACCHARATE, AMPHETAMINE ASPARTATE MONOHYDRATE, DEXTROAMPHETAMINE SULFATE AND AMPHETAMINE SULFATE 5; 5; 5; 5 MG/1; MG/1; MG/1; MG/1
20 CAPSULE, EXTENDED RELEASE ORAL DAILY
Qty: 30 CAPSULE | Refills: 0 | Status: SHIPPED | OUTPATIENT
Start: 2024-10-15

## 2024-10-15 NOTE — PROGRESS NOTES
"  Assessment & Plan     Screening examination for STI  Testing as patient requested for ureaplasma.    Reviewed treatment, would be with doxycycline if positive.  Discussed the unsure link between BV and ureaplasma, but reasonable to try to see if we can improve outcomes for the couple.  Discussed other STI testing.  He declined blood draw as he believes these to have been recently done and normal, and his risk low.  He was ok with adding GC.CT to urine  Mychart for results.  - Chlamydia & Gonorrhea by PCR, [facility] - Clinic Collect  - Urogenital Ureaplasma and Mycoplasma Species by PCR    BMI  Estimated body mass index is 29.42 kg/m  as calculated from the following:    Height as of this encounter: 1.829 m (6' 0.01\").    Weight as of this encounter: 98.4 kg (217 lb).         Subjective   Will is a 29 year old, presenting for the following health issues:  office visit (Partner's doctor recommended I get checked for Ureaplasma since partner continues to come back positive for bacterial vaginosis.)    History of Present Illness       Reason for visit:  Partner s doctor recommended I get checked for Ureaplasma since partner continues to come back positive for bacterial vaginosis.  Symptom onset:  More than a month  Symptoms include:  N/A  Symptom intensity:  Mild  Symptom progression:  Staying the same  Had these symptoms before:  No  What makes it worse:  N/A  What makes it better:  N/A   He is taking medications regularly.       29 year old with request to test for ureaplasma.  Has partner with recurrent BV.  Her provider has advised this check.  He has no symptoms of dysuira, urethreitis, discharge, or rash.         Objective    /82 (BP Location: Right arm, Patient Position: Sitting, Cuff Size: Adult Regular)   Pulse 80   Temp 98.2  F (36.8  C) (Temporal)   Resp 16   Ht 1.829 m (6' 0.01\")   Wt 98.4 kg (217 lb)   SpO2 98%   BMI 29.42 kg/m    Body mass index is 29.42 kg/m .  Physical Exam   GEN:alert, " not distressed        Signed Electronically by: Konrad Sutherland MD    Prior to immunization administration, verified patients identity using patient s name and date of birth. Please see Immunization Activity for additional information.     Screening Questionnaire for Adult Immunization    Are you sick today?   No   Do you have allergies to medications, food, a vaccine component or latex?   No   Have you ever had a serious reaction after receiving a vaccination?   No   Do you have a long-term health problem with heart, lung, kidney, or metabolic disease (e.g., diabetes), asthma, a blood disorder, no spleen, complement component deficiency, a cochlear implant, or a spinal fluid leak?  Are you on long-term aspirin therapy?   No   Do you have cancer, leukemia, HIV/AIDS, or any other immune system problem?   No   Do you have a parent, brother, or sister with an immune system problem?   No   In the past 3 months, have you taken medications that affect  your immune system, such as prednisone, other steroids, or anticancer drugs; drugs for the treatment of rheumatoid arthritis, Crohn s disease, or psoriasis; or have you had radiation treatments?   No   Have you had a seizure, or a brain or other nervous system problem?   No   During the past year, have you received a transfusion of blood or blood    products, or been given immune (gamma) globulin or antiviral drug?   No   For women: Are you pregnant or is there a chance you could become       pregnant during the next month?   No   Have you received any vaccinations in the past 4 weeks?   No     Immunization questionnaire answers were all negative.      Patient instructed to remain in clinic for 15 minutes afterwards, and to report any adverse reactions.     Screening performed by Janes Lara MA on 10/15/2024 at 12:23 PM.

## 2024-10-16 LAB
C TRACH DNA SPEC QL PROBE+SIG AMP: NEGATIVE
N GONORRHOEA DNA SPEC QL NAA+PROBE: NEGATIVE

## 2024-10-17 ENCOUNTER — DOCUMENTATION ONLY (OUTPATIENT)
Dept: SLEEP MEDICINE | Facility: CLINIC | Age: 29
End: 2024-10-17
Payer: COMMERCIAL

## 2024-10-17 LAB
M GENITALIUM DNA SPEC QL NAA+PROBE: NOT DETECTED
M HOMINIS DNA SPEC QL NAA+PROBE: NOT DETECTED
U PARVUM DNA SPEC QL NAA+PROBE: DETECTED
U UREALYTICUM DNA SPEC QL NAA+PROBE: NOT DETECTED

## 2024-10-17 RX ORDER — DOXYCYCLINE 100 MG/1
100 CAPSULE ORAL 2 TIMES DAILY
Qty: 14 CAPSULE | Refills: 0 | Status: SHIPPED | OUTPATIENT
Start: 2024-10-17 | End: 2024-10-24

## 2024-10-17 NOTE — PROGRESS NOTES
30 day Sleep therapy management telephone visit    Diagnostic AHI:     WatchPat:10.9 events per hour       LEFT VOICE MESSAGE FOR PATIENT TO RETURN CALL      Objective measures: 14 day rolling measures   COMPLIANCE LEAK AHI AVERAGE USE IN MINUTES   50 % 5.52 5.99 202   GOAL >70% GOAL < 24 LPM GOAL <5 GOAL >240          Device settings:  CPAP MIN CPAP MAX EPR RESMED SOFT RESPONSE SETTING   5.0 cm  H20 15.0 cm  H20 TWO OFF         Patient has the following upcoming sleep appts:  Future Sleep Appointments         Provider Department    12/26/2024 8:30 AM (Arrive by 8:15 AM) Nancy Hills, [facility]            Replacement device: No  STM ordered by provider: Yes     Total time spent on accessing and  interpreting remote patient PAP therapy data  10 minutes    Total time spent counseling, coaching  and reviewing PAP therapy data with patient  0 minutes

## 2024-11-11 ENCOUNTER — PATIENT OUTREACH (OUTPATIENT)
Dept: CARE COORDINATION | Facility: CLINIC | Age: 29
End: 2024-11-11
Payer: COMMERCIAL

## 2024-11-18 ENCOUNTER — VIRTUAL VISIT (OUTPATIENT)
Dept: PSYCHOLOGY | Facility: CLINIC | Age: 29
End: 2024-11-18
Payer: COMMERCIAL

## 2024-11-18 DIAGNOSIS — F90.2 ADHD (ATTENTION DEFICIT HYPERACTIVITY DISORDER), COMBINED TYPE: ICD-10-CM

## 2024-11-18 DIAGNOSIS — F31.9 BIPOLAR I DISORDER (H): Primary | ICD-10-CM

## 2024-11-18 DIAGNOSIS — F41.1 GENERALIZED ANXIETY DISORDER: ICD-10-CM

## 2024-11-18 PROCEDURE — 90834 PSYTX W PT 45 MINUTES: CPT | Mod: 95 | Performed by: SOCIAL WORKER

## 2024-11-18 ASSESSMENT — PATIENT HEALTH QUESTIONNAIRE - PHQ9
SUM OF ALL RESPONSES TO PHQ QUESTIONS 1-9: 3
10. IF YOU CHECKED OFF ANY PROBLEMS, HOW DIFFICULT HAVE THESE PROBLEMS MADE IT FOR YOU TO DO YOUR WORK, TAKE CARE OF THINGS AT HOME, OR GET ALONG WITH OTHER PEOPLE: SOMEWHAT DIFFICULT
SUM OF ALL RESPONSES TO PHQ QUESTIONS 1-9: 3

## 2024-11-18 NOTE — PROGRESS NOTES
[facility]                                     Progress Note    Patient Name: Lux Bailey  Date: 11/18/2024         Service Type: Individual      Session Start Time: 8:02am  Session End Time: 8:48am     Session Length: 46 minutes    Session #: 3    Attendees: Client    Service Modality:  Video Visit:      Provider verified identity through the following two step process.  Patient provided:  Patient is known previously to provider and Patient was verified at admission/transfer    Telemedicine Visit: The patient's condition can be safely assessed and treated via synchronous audio and visual telemedicine encounter.      Reason for Telemedicine Visit: Patient has requested telehealth visit    Originating Site (Patient Location): Patient's home    Distant Site (Provider Location): [facility]: [place]    Consent:  The patient/guardian has verbally consented to: the potential risks and benefits of telemedicine (video visit) versus in person care; bill my insurance or make self-payment for services provided; and responsibility for payment of non-covered services.     Patient would like the video invitation sent by:  Send to e-mail at: [email or web address]    Mode of Communication:  Video Conference via [employer]    Distant Location (Provider):  On-site    As the provider I attest to compliance with applicable laws and regulations related to telemedicine.    DATA  Interactive Complexity: No  Crisis: No        Progress Since Last Session (Related to Symptoms / Goals / Homework):   Symptoms: Worsening Patient reports some increased anxiety since the time of his previous appointment, primarily related to recent life stressors.    Homework: Achieved / completed to satisfaction      Episode of Care Goals: Satisfactory progress - ACTION (Actively working towards change); Intervened by reinforcing change plan / affirming steps taken     Current / Ongoing Stressors and Concerns:     Patient reports a history of challenges with mood and anxiety symptoms, and well as focus and concentration. He also reports a history of disordered eating patterns, all of which has impacted his mental health and functioning. Patient reports that he will returning to school this year, and also works full-time. Additionally, patient reports that his family lives in another part of the country, and notes that it is sometimes difficult to live so far away from one another. He reports that his childhood was difficult at times, as the family lived in poverty, and his father struggled with substance use. He notes that he has good relationships with family overall at this time. Patient reports some increased anxiety since the time of his previous appointment, which he notes are related to recent life stressors, stating that he recently moved, in addition to having had a busy few weeks at work. He continues to note that the medication he was prescribed appears to be helping with his weight loss journey, and his is feeling good about this, though has been more mindful of maintaining healthy habits as well as he is able recently, noting that increased anxiety has preceded disordered eating habits in the past. Patient reports that he tried tracking his mood as discussed at this previous appointment, and did not have much success with this, due to how busy he was and how it felt inconvenient and frustrating to do this at times. He plans to give this another try between now and his next appointment, He states that he also plans to start the CBT workbook for Adult ADHD discussed at his previous appointment, and has set a reminder to work on this for 30 minutes each day. Patient reports a history of people pleasing tendencies throughout his lifetime, which have been highlighted for him more recently, and wanting to potentially address this concern at some point as well, as he notes a history of this impacting his mental health,  mood, relationships and functioning to varying degrees.      Treatment Objective(s) Addressed in This Session:   1) Patient will identify at least 3-4 worries or thought patterns that contribute to feeling anxious or depressed.  2) Patient will learn at least 3-4 new coping skills for anxiety and depression symptom management, and will practice using these skills at least 1-2 times daily.  3) Patient will identify 3-5 thought patterns which contribute to binge eating.  4) Patient will  be able to effectively use manage self-critical thoughts related to binge eating or body image, as they occur, and/or accept a compliment, at least 3-5 times weekly.             Intervention:   Motivational Interviewing     MI Intervention: Expressed Empathy/Understanding, Supported Autonomy, Collaboration, Evocation, Open-ended questions, Reflections: simple and complex, Change talk (evoked), and Reframe      Change Talk Expressed by the Patient: Desire to change Ability to change Reasons to change Need to change Committment to change Activation Taking steps     Provider Response to Change Talk: E - Evoked more info from patient about behavior change, A - Affirmed patient's thoughts, decisions, or attempts at behavior change, R - Reflected patient's change talk, and S - Summarized patient's change talk statements     Psychodynamic: This writer used open ended questions and reflective listening to assist patient in processing his thoughts and emotions as related to the reported symptoms and presenting concerns.     Assessments completed prior to visit:  The following assessments were completed by patient for this visit:  PHQ9:       1/3/2022     3:09 PM 3/6/2024     2:00 PM 4/4/2024     9:39 PM 7/3/2024     9:00 AM 8/29/2024     8:17 AM 9/10/2024     6:02 AM 11/18/2024     6:07 AM   PHQ-9 SCORE   PHQ-9 Total Score MyChart   5 (Mild depression)  5 (Mild depression) 8 (Mild depression) 3 (Minimal depression)   PHQ-9 Total Score 6 5 5 5  5 8 3        Patient-reported     GAD7:       3/6/2024     2:00 PM 4/4/2024     9:40 PM 8/29/2024     8:18 AM   AMAURY-7 SCORE   Total Score  4 (minimal anxiety) 4 (minimal anxiety)   Total Score 10 4 4         ASSESSMENT: Current Emotional / Mental Status (status of significant symptoms):   Risk status (Self / Other harm or suicidal ideation)   Patient denies current fears or concerns for personal safety.   Patient denies current or recent suicidal ideation or behaviors.   Patient denies current or recent homicidal ideation or behaviors.   Patient denies current or recent self injurious behavior or ideation.   Patient denies other safety concerns.   Patient reports there has been no change in risk factors since their last session.     Patient reports there has been no change in protective factors since their last session.     Recommended that patient call 911 or go to the local ED should there be a change in any of these risk factors     Appearance:   Appropriate    Eye Contact:   Good    Psychomotor Behavior: Normal    Attitude:   Cooperative  Interested Attentive   Orientation:   All   Speech    Rate / Production: Normal     Volume:  Normal    Mood:    Anxious  Normal   Affect:    Appropriate    Thought Content:  Clear    Thought Form:  Coherent  Logical    Insight:    Good      Medication Review:   No changes to current psychiatric medication(s)     Medication Compliance:   Yes     Changes in Health Issues:   None reported     Chemical Use Review:   Substance Use: Chemical use reviewed, no active concerns identified      Tobacco Use: No current tobacco use.      Diagnosis:  F31.9 Bi Polar I Disorder  F41.1 Generalized Anxiety Disorder  F90.2 Attention Deficit Hyperactivity Disorder, Combined Presentation        Collateral Reports Completed:   Not Applicable    PLAN: (Patient Tasks / Therapist Tasks / Other)  Between now and his next appointment, patient will continue to track his symptoms to identify potential  "patterns, and will start reading \"The CBT Workbook for Adult ADHD\" as discussed during today's appointment.           Torrie De Leon, LICSW                                                         ______________________________________________________________________    Individual Treatment Plan     Patient's Name: Lux Bailey               YOB: 1995     Date of Creation: 10/7/2024  Date Treatment Plan Last Reviewed/Revised: 10/7/2024 (initial treatment plan)     DSM5 Diagnoses: Attention-Deficit/Hyperactivity Disorder  314.01 (F90.2) Combined presentation, 296.52 Bipolar I Disorder Current or Most Recent Episode Depressed, Moderate, or 300.02 (F41.1) Generalized Anxiety Disorder  Psychosocial / Contextual Factors: Patient reports a history of challenges with mood and anxiety symptoms, and well as focus and concentration. He also reports a history of disordered eating patterns, all of which has impacted his mental health and functioning. Patient reports that he will returning to school this year, and also works full-time. Additionally, patient reports that his family lives in another part of the country, and notes that it is sometimes difficult to live so far away from one another. He reports that his childhood was difficult at times, as the family lived in poverty, and his father struggled with substance use. He notes that he has good relationships with family overall at this time.   PROMIS (reviewed every 90 days): completed on 9/10/24 with a score of 26     Referral / Collaboration:  Referral to another professional/service is not indicated at this time..     Anticipated number of session for this episode of care:  18-24 sessions  Anticipation frequency of session: Every other week  Anticipated Duration of each session: 38-52 minutes  Treatment plan will be reviewed in 90 days or when goals have been changed.         MeasurableTreatment Goal(s) related to diagnosis / functional " "impairment(s)  Goal 1: Patient will further develop helpful coping skills to assist her with management of depression and anxiety symptoms, and practice using these skills at least 1-2 times daily, as reported by her, over 3 months' time.   \"I'll know I\"ve met my goal when I have specific skills for managing ADHD symptoms.\"      Objective #A (Patient Action)                        Patient will identify at least 3-4 worries or thought patterns that contribute to feeling anxious or depressed.  Status: New - Date: 10/7/2024       Intervention(s)  Therapist will ask patient to track her symptoms, to identify any potential patterns with thoughts, emotions, triggers, situations/circumstances.     Objective #B  Patient will learn at least 3-4 new coping skills for anxiety and depression symptom management, and will practice using these skills at least 1-2 times daily.  Status: New - Date: 10/7/2024       Intervention(s)  Therapist will teach and practice mindfulness based and cognitive coping skills in session with patient, and will ask her to practice these skills in between appointments as well.         Goal 2: Patient will be able to effectively address thoughts and feelings related to binge eating disorder, at least 3-5 times weekly, as reported by him, over 3 months' time.     I will know I've met my goal when I'm able to address feelings of guilt related to binge eating, and to not be so hard on myself.\"      Objective #A (Patient Action)                          Status: New - Date: 10/7/2024       Patient will identify 3-5 thought patterns which contribute to binge eating.     Intervention(s)  Therapist will  ask patient to track symptoms, to identify potential patterns with triggers, thought patterns, emotions, body cues, etc related to binge eating .     Objective #B  Patient will  be able to effectively use manage self-critical thoughts related to binge eating or body image, as they occur, and/or accept a " compliment, at least 3-5 times weekly .                        Status: New - Date: 10/7/2024       Intervention(s)  Therapist will assist patient in identifying self-affirming language and other strategies for management of self-critical thought patterns that resonate with him, and will ask him to practice using these skills outside of session.            Patient has reviewed and agreed to the above plan.        Torrie De Leon, [facility]                 October 7, 2024

## 2024-11-19 ENCOUNTER — MYC MEDICAL ADVICE (OUTPATIENT)
Dept: PSYCHIATRY | Facility: CLINIC | Age: 29
End: 2024-11-19
Payer: COMMERCIAL

## 2024-11-19 DIAGNOSIS — F90.0 ATTENTION DEFICIT HYPERACTIVITY DISORDER (ADHD), PREDOMINANTLY INATTENTIVE TYPE: ICD-10-CM

## 2024-11-19 NOTE — TELEPHONE ENCOUNTER
Date of Last Office Visit: 8/29/24  Date of Next Office Visit:  12/2/24  No shows since last visit: No  More than one patient-initiated cancellation (with reschedule) since last seen in clinic? No    []Medication refilled per  Medication Refill in Ambulatory Care  policy.  [x]Medication unable to be refilled by RN due to criteria not met as indicated below:    []Eligibility: has not had a provider visit within last 6 months   []Supervision: no future appointment; < 7 days before next appointment   []Compliance: no shows; cancellations; lapse in therapy   []Verification: order discrepancy; may need modification...   [] > 30-day supply request   []Advanced refill request: > 7 days before refill date   [x]Controlled medication   []Medication not included in policy   []Review: new med; med adjusted <= 30 days; safety alert; requires lab monitoring...   []Scope of Practice: refill request processed by LPN/MA   []Other:      Medication(s) requested:     -  [GENERIC] amphetamine-dextroamphetamine (ADDERALL XR) 20 MG 24 hr capsule  Date last ordered: 10/15/24  Qty: 30  Refills: 0  Appropriate for refill? Provider to review.        Any Cont[place] Sub[place](s)? [place]   [place]? N/[place]; provider to review.       Requested medication(s) verified as identical to current order? Yes    Any lapse in adherence to medication(s) greater than 5 days? No      Additional action taken? contacted patient via [employer] (see message), cued up medication/order(s), and routed encounter to provider for review.      Last visit treatment plan:     Current psychiatric medications include  Lithium ER 600mg   Zoloft (sertraline) 50mg once a da  Adderall 20 mg daily     Patient will continue on current medications and return to the clinic approximately 12 weeks for follow-up appointment. Patient to call in between visits any questions or concerns.       Any medication(s) require lab monitoring? No

## 2024-11-20 RX ORDER — DEXTROAMPHETAMINE SACCHARATE, AMPHETAMINE ASPARTATE MONOHYDRATE, DEXTROAMPHETAMINE SULFATE AND AMPHETAMINE SULFATE 5; 5; 5; 5 MG/1; MG/1; MG/1; MG/1
20 CAPSULE, EXTENDED RELEASE ORAL DAILY
Qty: 30 CAPSULE | Refills: 0 | Status: SHIPPED | OUTPATIENT
Start: 2024-11-20

## 2024-12-02 ENCOUNTER — VIRTUAL VISIT (OUTPATIENT)
Dept: PSYCHOLOGY | Facility: CLINIC | Age: 29
End: 2024-12-02
Payer: COMMERCIAL

## 2024-12-02 ENCOUNTER — VIRTUAL VISIT (OUTPATIENT)
Dept: PSYCHIATRY | Facility: CLINIC | Age: 29
End: 2024-12-02
Payer: COMMERCIAL

## 2024-12-02 VITALS — WEIGHT: 201 LBS | HEIGHT: 72 IN | BODY MASS INDEX: 27.22 KG/M2

## 2024-12-02 DIAGNOSIS — F90.2 ADHD (ATTENTION DEFICIT HYPERACTIVITY DISORDER), COMBINED TYPE: ICD-10-CM

## 2024-12-02 DIAGNOSIS — F90.0 ATTENTION DEFICIT HYPERACTIVITY DISORDER (ADHD), PREDOMINANTLY INATTENTIVE TYPE: ICD-10-CM

## 2024-12-02 DIAGNOSIS — F41.1 GENERALIZED ANXIETY DISORDER: ICD-10-CM

## 2024-12-02 DIAGNOSIS — F31.9 BIPOLAR I DISORDER (H): Primary | ICD-10-CM

## 2024-12-02 PROCEDURE — 99214 OFFICE O/P EST MOD 30 MIN: CPT | Mod: 95 | Performed by: NURSE PRACTITIONER

## 2024-12-02 PROCEDURE — 90834 PSYTX W PT 45 MINUTES: CPT | Mod: 95 | Performed by: SOCIAL WORKER

## 2024-12-02 ASSESSMENT — ANXIETY QUESTIONNAIRES
IF YOU CHECKED OFF ANY PROBLEMS ON THIS QUESTIONNAIRE, HOW DIFFICULT HAVE THESE PROBLEMS MADE IT FOR YOU TO DO YOUR WORK, TAKE CARE OF THINGS AT HOME, OR GET ALONG WITH OTHER PEOPLE: SOMEWHAT DIFFICULT
GAD7 TOTAL SCORE: 13
GAD7 TOTAL SCORE: 13
3. WORRYING TOO MUCH ABOUT DIFFERENT THINGS: MORE THAN HALF THE DAYS
GAD7 TOTAL SCORE: 13
7. FEELING AFRAID AS IF SOMETHING AWFUL MIGHT HAPPEN: SEVERAL DAYS
1. FEELING NERVOUS, ANXIOUS, OR ON EDGE: NEARLY EVERY DAY
7. FEELING AFRAID AS IF SOMETHING AWFUL MIGHT HAPPEN: SEVERAL DAYS
6. BECOMING EASILY ANNOYED OR IRRITABLE: SEVERAL DAYS
2. NOT BEING ABLE TO STOP OR CONTROL WORRYING: MORE THAN HALF THE DAYS
8. IF YOU CHECKED OFF ANY PROBLEMS, HOW DIFFICULT HAVE THESE MADE IT FOR YOU TO DO YOUR WORK, TAKE CARE OF THINGS AT HOME, OR GET ALONG WITH OTHER PEOPLE?: SOMEWHAT DIFFICULT
5. BEING SO RESTLESS THAT IT IS HARD TO SIT STILL: SEVERAL DAYS
4. TROUBLE RELAXING: NEARLY EVERY DAY

## 2024-12-02 ASSESSMENT — PAIN SCALES - GENERAL: PAINLEVEL_OUTOF10: NO PAIN (0)

## 2024-12-02 ASSESSMENT — PATIENT HEALTH QUESTIONNAIRE - PHQ9
SUM OF ALL RESPONSES TO PHQ QUESTIONS 1-9: 4
10. IF YOU CHECKED OFF ANY PROBLEMS, HOW DIFFICULT HAVE THESE PROBLEMS MADE IT FOR YOU TO DO YOUR WORK, TAKE CARE OF THINGS AT HOME, OR GET ALONG WITH OTHER PEOPLE: SOMEWHAT DIFFICULT
SUM OF ALL RESPONSES TO PHQ QUESTIONS 1-9: 4

## 2024-12-02 NOTE — PATIENT INSTRUCTIONS
"The [facility]  What to Expect  Here's what to expect in the Panel Psychiatry Program.   About the program  You'll be meeting with a psychiatric doctor to check your mental health. A psychiatric doctor helps you deal with troubling thoughts and feelings by giving you medicine. They'll make sure you know the plan for your care. You may see them for a long time. When you're feeling better, they may refer you back to seeing your family doctor.   If you have any questions, we'll be glad to talk to you.  About visits  Be open  At your visits, please talk openly about your problems. It may feel hard, but it's the best way for us to help you.  Cancelling visits  If you can't come to your visit, please call us right away at [phone]. If you don't cancel at least 24 hours (1 full day) before your visit, that's \"late cancellation.\"  Not showing up for your visits  Being very late is the same as not showing up. You'll be a \"no show\" if:  You're more than 15 minutes late for a 30-minute (half hour) visit.  You're more than 30 minutes late for a 60-minute (full hour) visit.  If you cancel late or don't show up 2 times within 6 months, we may end your care.  Getting help between visits  If you need help between visits, you can call us Monday to Friday from 8 a.m. to 4:30 p.m. at [phone].  Emergency care  Call 911 or go to the nearest emergency department if your life or someone else's life is in danger.  Call 988 anytime to reach the national Suicide and Crisis hotline.  Medicine refills  To refill your medicine, call your pharmacy. You can also call [facility] at [phone], Monday to Friday, 8 a.m. to 4:30 p.m. It can take 1 to 3 business days to get a refill.   Forms, letters, and tests  You may have papers to fill out, like FMLA, short-term disability, and workability. We can help you with these forms at your visits, but you must have an appointment. You may need more " than 1 visit for this, to be in an intensive therapy program, or both.  Before we can give you medicine for ADHD, we may refer you to get tested for it or confirm it another way.  We may not be able to give you an emotional support animal letter.  We don't do mental health checks ordered by the court.   We don't do mental health testing, but we can refer you to get tested.   Thank you for choosing us for your care.  For informational purposes only. Not to replace the advice of your health care provider. Copyright   2022 [facility]. All rights reserved. [employer] [id number] - 12/22      After Visit Summary   Continue medications as prescribed  Have your pharmacy contact us for a refill if you are running low on medications (We may ask you to come into clinic to get a refill from the nurse  No Alcohol or drug use  No driving if sedated  Call the clinic with any questions or concerns   Reach out for help if you feel like hurting yourself or others ([facility] [phone]: [address], [address] or [facility] Suicide Hotline   [phone] , call 911 or go to nearest Emergency room     Crisis Resources:    Present to the Emergency Department as needed or call after hours crisis line at [phone] or [phone].   [place] Crisis Text Line: Text [place] to 401861.  Suicide LifeLine Chat: suicidepreventionlifeline.org/chat/.  National Suicide Prevention Lifeline: 322.484.7356 (TTY: 381.586.4760). Call anytime for help.  (www.suicidepreventionlifeline.org)  National [place] on Mental Illness (www.ileana.org): 280.567.7583 or 483-826-0764.  Mental Health Association (www.mentalhealth.org): 714.818.3444 or 626-756-9175.       Follow up as directed, for your appointments, per your After Visit Summary Form.

## 2024-12-02 NOTE — NURSING NOTE
Current patient location: [facility] [address], [place] [address]    Is the patient currently in the state of [place]? YES    Visit mode:VIDEO    If the visit is dropped, the patient can be reconnected by:VIDEO VISIT: Send to e-mail at: [email or web address]    Will anyone else be joining the visit? NO  (If patient encounters technical issues they should call [phone])    Are changes needed to the allergy or medication list? No    Are refills needed on medications prescribed by this physician? NO    Rooming Documentation:  Questionnaire(s) completed    Reason for visit: RECHECK    Mattie ADORNO

## 2024-12-03 NOTE — PROGRESS NOTES
[facility]                                     Progress Note    Patient Name: Lux Bailey  Date: 12/2/2024         Service Type: Individual      Session Start Time: 11:02am  Session End Time: 11:45 am     Session Length: 43 minutes    Session #: 4    Attendees: Client    Service Modality:  Video Visit:      Provider verified identity through the following two step process.  Patient provided:  Patient is known previously to provider and Patient was verified at admission/transfer    Telemedicine Visit: The patient's condition can be safely assessed and treated via synchronous audio and visual telemedicine encounter.      Reason for Telemedicine Visit: Patient has requested telehealth visit    Originating Site (Patient Location): Patient's home    Distant Site (Provider Location): [facility]: [place]    Consent:  The patient/guardian has verbally consented to: the potential risks and benefits of telemedicine (video visit) versus in person care; bill my insurance or make self-payment for services provided; and responsibility for payment of non-covered services.     Patient would like the video invitation sent by:  Send to e-mail at: [email or web address]    Mode of Communication:  Video Conference via [facility]    Distant Location (Provider):  On-site    As the provider I attest to compliance with applicable laws and regulations related to telemedicine.    DATA  Interactive Complexity: No  Crisis: No        Progress Since Last Session (Related to Symptoms / Goals / Homework):   Symptoms: Worsening Patient reports increased anxiety since the time of his previous appointment, related to recent life stressors.    Homework: Partially completed      Episode of Care Goals: Satisfactory progress - ACTION (Actively working towards change); Intervened by reinforcing change plan / affirming steps taken     Current / Ongoing Stressors and Concerns:   Patient reports a history of  challenges with mood and anxiety symptoms, and well as focus and concentration. He also reports a history of disordered eating patterns, all of which has impacted his mental health and functioning. Patient reports that he will returning to school this year, and also works full-time. Additionally, patient reports that his family lives in another part of the country, and notes that it is sometimes difficult to live so far away from one another. He reports that his childhood was difficult at times, as the family lived in poverty, and his father struggled with substance use. Patient reports some increased anxiety since the time of his previous appointment, which he notes are related to recent life stressors, stating the Thanksgiving holiday was very stressful for him and his family, and noting that there were events that took place that led to his parents deciding to get a divorce. Patient notes that he has grieved the loss of a healthy relationship with his father several years ago, though is very worried for his mother and his brother, who still reside in the same home with patient's father. He reports that he has spoken with both of them in regard to his concerns, and believes that the family has an okay plan in place for managing any additional conflict or potential safety concerns that might come up until his mother and his younger brother are able to move out of the home. Patient states that it has been very difficult for him to be across the country from his mother and sibling as this is occurring, and has been coping by talking with them daily, recognizing what he is able to do to help, and trying to remember to take intentional time for himself to engage in self-care.      Treatment Objective(s) Addressed in This Session:   1) Patient will identify at least 3-4 worries or thought patterns that contribute to feeling anxious or depressed.  2) Patient will learn at least 3-4 new coping skills for anxiety and  depression symptom management, and will practice using these skills at least 1-2 times daily.  3) Patient will identify 3-5 thought patterns which contribute to binge eating.  4) Patient will  be able to effectively use manage self-critical thoughts related to binge eating or body image, as they occur, and/or accept a compliment, at least 3-5 times weekly.             Intervention:   Motivational Interviewing     MI Intervention: Expressed Empathy/Understanding, Supported Autonomy, Collaboration, Evocation, Open-ended questions, Reflections: simple and complex, Change talk (evoked), and Reframe      Change Talk Expressed by the Patient: Desire to change Ability to change Reasons to change Need to change Committment to change Activation Taking steps     Provider Response to Change Talk: E - Evoked more info from patient about behavior change, A - Affirmed patient's thoughts, decisions, or attempts at behavior change, R - Reflected patient's change talk, and S - Summarized patient's change talk statements     Psychodynamic: This writer used open ended questions and reflective listening to assist patient in processing his thoughts and emotions as related to the reported symptoms and presenting concerns.        Assessments completed prior to visit:  The following assessments were completed by patient for this visit:  PHQ9:       3/6/2024     2:00 PM 4/4/2024     9:39 PM 7/3/2024     9:00 AM 8/29/2024     8:17 AM 9/10/2024     6:02 AM 11/18/2024     6:07 AM 12/2/2024     8:27 AM   PHQ-9 SCORE   PHQ-9 Total Score MyChart  5 (Mild depression)  5 (Mild depression) 8 (Mild depression) 3 (Minimal depression) 4 (Minimal depression)   PHQ-9 Total Score 5 5 5 5 8 3  4        Patient-reported     GAD7:       3/6/2024     2:00 PM 4/4/2024     9:40 PM 8/29/2024     8:18 AM 12/2/2024     8:28 AM   AMAURY-7 SCORE   Total Score  4 (minimal anxiety) 4 (minimal anxiety) 13 (moderate anxiety)   Total Score 10 4 4 13        Patient-reported  "        ASSESSMENT: Current Emotional / Mental Status (status of significant symptoms):   Risk status (Self / Other harm or suicidal ideation)   Patient denies current fears or concerns for personal safety.   Patient denies current or recent suicidal ideation or behaviors.   Patient denies current or recent homicidal ideation or behaviors.   Patient denies current or recent self injurious behavior or ideation.   Patient denies other safety concerns.   Patient reports there has been no change in risk factors since their last session.     Patient reports there has been no change in protective factors since their last session.     Recommended that patient call 911 or go to the local ED should there be a change in any of these risk factors     Appearance:   Appropriate    Eye Contact:   Good    Psychomotor Behavior: Normal    Attitude:   Cooperative  Interested Attentive   Orientation:   All   Speech    Rate / Production: Normal     Volume:  Normal    Mood:    Anxious  Normal   Affect:    Subdued  Worrisome    Thought Content:  Clear    Thought Form:  Coherent  Logical    Insight:    Good      Medication Review:   No changes to current psychiatric medication(s)     Medication Compliance:   Yes     Changes in Health Issues:   None reported     Chemical Use Review:   Substance Use: Chemical use reviewed, no active concerns identified      Tobacco Use: No current tobacco use.      Diagnosis:  F31.9 Bi Polar I Disorder  F41.1 Generalized Anxiety Disorder  F90.2 Attention Deficit Hyperactivity Disorder, Combined Presentation     Collateral Reports Completed:   Not Applicable    PLAN: (Patient Tasks / Therapist Tasks / Other)  Between now and his next appointment, patient will continue to track his symptoms to identify potential patterns, and will start reading \"The CBT Workbook for Adult ADHD\" as discussed during today's appointment.         Torrie De Leon, LESLYESW                                                     "     ______________________________________________________________________    Individual Treatment Plan     Patient's Name: Lux Bailey               YOB: 1995     Date of Creation: 10/7/2024  Date Treatment Plan Last Reviewed/Revised: 10/7/2024 (initial treatment plan)     DSM5 Diagnoses: Attention-Deficit/Hyperactivity Disorder  314.01 (F90.2) Combined presentation, 296.52 Bipolar I Disorder Current or Most Recent Episode Depressed, Moderate, or 300.02 (F41.1) Generalized Anxiety Disorder  Psychosocial / Contextual Factors: Patient reports a history of challenges with mood and anxiety symptoms, and well as focus and concentration. He also reports a history of disordered eating patterns, all of which has impacted his mental health and functioning. Patient reports that he will returning to school this year, and also works full-time. Additionally, patient reports that his family lives in another part of the country, and notes that it is sometimes difficult to live so far away from one another. He reports that his childhood was difficult at times, as the family lived in poverty, and his father struggled with substance use. He notes that he has good relationships with family overall at this time.   PROMIS (reviewed every 90 days): completed on 9/10/24 with a score of 26     Referral / Collaboration:  Referral to another professional/service is not indicated at this time..     Anticipated number of session for this episode of care:  18-24 sessions  Anticipation frequency of session: Every other week  Anticipated Duration of each session: 38-52 minutes  Treatment plan will be reviewed in 90 days or when goals have been changed.         MeasurableTreatment Goal(s) related to diagnosis / functional impairment(s)  Goal 1: Patient will further develop helpful coping skills to assist her with management of depression and anxiety symptoms, and practice using these skills at least 1-2 times daily, as  "reported by her, over 3 months' time.   \"I'll know I\"ve met my goal when I have specific skills for managing ADHD symptoms.\"      Objective #A (Patient Action)                        Patient will identify at least 3-4 worries or thought patterns that contribute to feeling anxious or depressed.  Status: New - Date: 10/7/2024       Intervention(s)  Therapist will ask patient to track her symptoms, to identify any potential patterns with thoughts, emotions, triggers, situations/circumstances.     Objective #B  Patient will learn at least 3-4 new coping skills for anxiety and depression symptom management, and will practice using these skills at least 1-2 times daily.  Status: New - Date: 10/7/2024       Intervention(s)  Therapist will teach and practice mindfulness based and cognitive coping skills in session with patient, and will ask her to practice these skills in between appointments as well.         Goal 2: Patient will be able to effectively address thoughts and feelings related to binge eating disorder, at least 3-5 times weekly, as reported by him, over 3 months' time.     I will know I've met my goal when I'm able to address feelings of guilt related to binge eating, and to not be so hard on myself.\"      Objective #A (Patient Action)                          Status: New - Date: 10/7/2024       Patient will identify 3-5 thought patterns which contribute to binge eating.     Intervention(s)  Therapist will  ask patient to track symptoms, to identify potential patterns with triggers, thought patterns, emotions, body cues, etc related to binge eating .     Objective #B  Patient will  be able to effectively use manage self-critical thoughts related to binge eating or body image, as they occur, and/or accept a compliment, at least 3-5 times weekly .                        Status: New - Date: 10/7/2024       Intervention(s)  Therapist will assist patient in identifying self-affirming language and other strategies " for management of self-critical thought patterns that resonate with him, and will ask him to practice using these skills outside of session.            Patient has reviewed and agreed to the above plan.        Torrie De Leon, [facility]                 October 7, 2024

## 2024-12-04 ENCOUNTER — OFFICE VISIT (OUTPATIENT)
Dept: FAMILY MEDICINE | Facility: CLINIC | Age: 29
End: 2024-12-04
Payer: COMMERCIAL

## 2024-12-04 VITALS
HEIGHT: 72 IN | OXYGEN SATURATION: 98 % | TEMPERATURE: 98.1 F | WEIGHT: 210 LBS | SYSTOLIC BLOOD PRESSURE: 126 MMHG | HEART RATE: 89 BPM | DIASTOLIC BLOOD PRESSURE: 82 MMHG | RESPIRATION RATE: 15 BRPM | BODY MASS INDEX: 28.44 KG/M2

## 2024-12-04 DIAGNOSIS — F31.9 BIPOLAR I DISORDER (H): ICD-10-CM

## 2024-12-04 DIAGNOSIS — Z23 ENCOUNTER FOR IMMUNIZATION: ICD-10-CM

## 2024-12-04 DIAGNOSIS — F50.9 EATING DISORDER, UNSPECIFIED TYPE: ICD-10-CM

## 2024-12-04 DIAGNOSIS — F90.0 ADHD (ATTENTION DEFICIT HYPERACTIVITY DISORDER), INATTENTIVE TYPE: ICD-10-CM

## 2024-12-04 DIAGNOSIS — Z00.00 ENCOUNTER FOR ANNUAL PHYSICAL EXAM: Primary | ICD-10-CM

## 2024-12-04 DIAGNOSIS — Z22.39 CARRIER OF UREAPLASMA UREALYTICUM: ICD-10-CM

## 2024-12-04 PROCEDURE — 91320 SARSCV2 VAC 30MCG TRS-SUC IM: CPT

## 2024-12-04 PROCEDURE — 99395 PREV VISIT EST AGE 18-39: CPT | Mod: 25

## 2024-12-04 PROCEDURE — 90471 IMMUNIZATION ADMIN: CPT

## 2024-12-04 PROCEDURE — 90472 IMMUNIZATION ADMIN EACH ADD: CPT

## 2024-12-04 PROCEDURE — 99000 SPECIMEN HANDLING OFFICE-LAB: CPT

## 2024-12-04 PROCEDURE — 90715 TDAP VACCINE 7 YRS/> IM: CPT

## 2024-12-04 PROCEDURE — 90480 ADMN SARSCOV2 VAC 1/ONLY CMP: CPT

## 2024-12-04 PROCEDURE — 87798 DETECT AGENT NOS DNA AMP: CPT | Mod: 90

## 2024-12-04 PROCEDURE — 99213 OFFICE O/P EST LOW 20 MIN: CPT | Mod: 25

## 2024-12-04 PROCEDURE — 90656 IIV3 VACC NO PRSV 0.5 ML IM: CPT

## 2024-12-04 PROCEDURE — 87563 M. GENITALIUM AMP PROBE: CPT | Mod: 90

## 2024-12-04 SDOH — HEALTH STABILITY: PHYSICAL HEALTH: ON AVERAGE, HOW MANY DAYS PER WEEK DO YOU ENGAGE IN MODERATE TO STRENUOUS EXERCISE (LIKE A BRISK WALK)?: 0 DAYS

## 2024-12-04 ASSESSMENT — SOCIAL DETERMINANTS OF HEALTH (SDOH): HOW OFTEN DO YOU GET TOGETHER WITH FRIENDS OR RELATIVES?: ONCE A WEEK

## 2024-12-04 ASSESSMENT — PAIN SCALES - GENERAL: PAINLEVEL_OUTOF10: NO PAIN (0)

## 2024-12-04 NOTE — PROGRESS NOTES
Preventive Care Visit  [facility]  William Ramos, FELIPE, Nurse Practitioner Primary Care  Dec 4, 2024      Assessment & Plan     Encounter for immunization  - INFLUENZA VACCINE,SPLIT VIRUS,TRIVALENT,PF(FLUZONE)  - COVID-19 12+ (PFIZER)  - TDAP 10-64Y (ADACEL,BOOSTRIX)    Carrier of ureaplasma urealyticum  - Urogenital Ureaplasma and Mycoplasma Species by PCR; Future  - Urogenital Ureaplasma and Mycoplasma Species by PCR  Recently treated for ureaplasma and would like re-test. Recommended he follow-up with ID if positive, as I would not recommend recurrent treatment with antibiotics for ureaplasma at this time. Partner had issues with BV    Eating disorder, unspecified type  In remission    Bipolar type 1   Managed through psychiatry ([facility]). IN remission    ADHD (attention deficit hyperactivity disorder), inattentive type  Controlled on Adderall. Managed through psychiatry ([facility])    Encounter for annual physical exam      Patient has been advised of split billing requirements and indicates understanding: Yes          Subjective   Will is a 29 year old, presenting for the following:  Physical (Would like to do flu and COVID vacc)        12/4/2024    10:11 AM   Additional Questions   Roomed by Umberto DAVIS              Has lost about 35 pounds intentionally (on compounded semaglutide).  Was seeing someone who kept getting bacterial vaginosis. Patient got treated for ureaplasma and would like a re-test.        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 Still working for [employer]       Diet: Has been working on reducing binge eating. Seeing a therapist now on binge eating and ADHD. Carbs are a big thing for him. 1-2 servings of vegetables per day.  Skips breakfast and small lunch (carrots, PBJ sandwich).  Exercise; Nothing currently.   Caffeine use went up a little bit.    Mood has been doing well on lithium/zoloft. Thinking of stepping of zoloft in the next  few months due to stability. Very anxious usual (related to family dynamics). Lithium for the last 6 years: manic episode last time was 2018.Always right below threshold. Never misses doses of his lithium.     No new partners. Had full STD testing    HPI        12/4/2024   General Health   How would you rate your overall physical health? Good   Feel stress (tense, anxious, or unable to sleep) To some extent      (!) STRESS CONCERN      12/4/2024   Nutrition   Three or more servings of calcium each day? Yes   Diet: Regular (no restrictions)   How many servings of fruit and vegetables per day? (!) 2-3   How many sweetened beverages each day? 0-1            12/4/2024   Exercise   Days per week of moderate/strenous exercise 0 days      (!) EXERCISE CONCERN      12/4/2024   Social Factors   Frequency of gathering with friends or relatives Once a week   Worry food won't last until get money to buy more No   Food not last or not have enough money for food? No   Do you have housing? (Housing is defined as stable permanent housing and does not include staying ouside in a car, in a tent, in an abandoned building, in an overnight shelter, or couch-surfing.) Yes   Are you worried about losing your housing? No   Lack of transportation? No   Unable to get utilities (heat,electricity)? No            12/4/2024   Dental   Dentist two times every year? Yes            12/4/2024   TB Screening   Were you born outside of the [place]? No                  12/4/2024   Substance Use   Alcohol more than 3/day or more than 7/wk No   Do you use any other substances recreationally? No        Social History     Tobacco Use    Smoking status: Never    Smokeless tobacco: Never   Vaping Use    Vaping status: Never Used   Substance Use Topics    Alcohol use: Yes     Comment: 1 glass whiskey once per month    Drug use: Never             12/4/2024   One time HIV Screening   Previous HIV test? Yes          12/4/2024   STI Screening   New sexual partner(s)  since last STI/HIV test? No            12/4/2024   Contraception/Family Planning   Questions about contraception or family planning No           Reviewed and updated as needed this visit by Provider                             Objective    Exam  /82   Pulse 89   Temp 98.1  F (36.7  C)   Resp 15   Ht 1.829 m (6')   Wt 95.3 kg (210 lb)   SpO2 98%   BMI 28.48 kg/m     Estimated body mass index is 28.48 kg/m  as calculated from the following:    Height as of this encounter: 1.829 m (6').    Weight as of this encounter: 95.3 kg (210 lb).    Physical Exam  GENERAL: alert and no distress  EYES: Eyes grossly normal to inspection, PERRL and conjunctivae and sclerae normal  HENT: ear canals and TM's normal, nose and mouth without ulcers or lesions  NECK: no adenopathy, no asymmetry, masses, or scars  RESP: lungs clear to auscultation - no rales, rhonchi or wheezes  CV: regular rate and rhythm, normal S1 S2, no S3 or S4, no murmur, click or rub, no peripheral edema  ABDOMEN: soft, nontender, no hepatosplenomegaly, no masses and bowel sounds normal  : Declined  exam today  MS: no gross musculoskeletal defects noted, no edema  SKIN: no suspicious lesions or rashes  NEURO: Normal strength and tone, mentation intact and speech normal  PSYCH: mentation appears normal, affect normal/bright        Signed Electronically by: William Ramos NP

## 2024-12-18 ENCOUNTER — MYC REFILL (OUTPATIENT)
Dept: PSYCHIATRY | Facility: CLINIC | Age: 29
End: 2024-12-18
Payer: COMMERCIAL

## 2024-12-18 DIAGNOSIS — F90.0 ATTENTION DEFICIT HYPERACTIVITY DISORDER (ADHD), PREDOMINANTLY INATTENTIVE TYPE: ICD-10-CM

## 2024-12-18 RX ORDER — DEXTROAMPHETAMINE SACCHARATE, AMPHETAMINE ASPARTATE MONOHYDRATE, DEXTROAMPHETAMINE SULFATE AND AMPHETAMINE SULFATE 5; 5; 5; 5 MG/1; MG/1; MG/1; MG/1
20 CAPSULE, EXTENDED RELEASE ORAL DAILY
Qty: 30 CAPSULE | Refills: 0 | Status: CANCELLED | OUTPATIENT
Start: 2024-12-18

## 2024-12-18 NOTE — TELEPHONE ENCOUNTER
Reason for call:  Medication   If this is a refill request, has the caller requested the refill from the pharmacy already? Yes Pharmacy told him to call his provider   Will the patient be using a [facility]? Yes  Name of the pharmacy and phone number for the current request: [facility]    Name of the medication requested: Adderall    Other request: Pharmacy only has 72 pills left and pt is worried they will run out     Phone number to reach patient:  Home number on file [phone] (home)    Best Time:  ASAP    Can we leave a detailed message on this number?  YES    Travel screening: Not Applicable

## 2024-12-18 NOTE — TELEPHONE ENCOUNTER
RN called [facility] and they do still have the medication available at this time.     Date of Last Office Visit: 12/2/2024  Date of Next Office Visit:  1/27/2024  No shows since last visit: No  More than one patient-initiated cancellation (with reschedule) since last seen in clinic? No    [x]Medication unable to be refilled by RN due to criteria not met as indicated below:      [x]Controlled medication    Medication(s) requested:   amphetamine-dextroamphetamine (ADDERALL XR) 20 MG 24 hr capsule 30 capsule 0 11/20/2024 -- No   Sig - Route: Take 1 capsule (20 mg) by mouth daily. - Oral       Any Controlled Substance(s)? Yes   [place]  checked? Yes   last sold on 11/23/2024 for quantity of #30.  Other controlled substance on [place] ?: No      Requested medication(s) verified as identical to current order? Yes    Any lapse in adherence to medication(s) greater than 5 days? No      Last visit treatment plan:   Plan      1.ADHD  : Continue Adderall 20 mg daily  2.  Bipolar-continue lithium 300 mg twice daily  3.  Depression-continue sertraline 50 mg daily-patient is considering weaning off in the next few months  4.  Highly recommend psychotherapy- Is now seeing a therapist - meets bi-weekly   5.  Return to the clinic in approximately 12 weeks calling between visits any questions or concerns

## 2024-12-19 RX ORDER — DEXTROAMPHETAMINE SACCHARATE, AMPHETAMINE ASPARTATE MONOHYDRATE, DEXTROAMPHETAMINE SULFATE AND AMPHETAMINE SULFATE 5; 5; 5; 5 MG/1; MG/1; MG/1; MG/1
20 CAPSULE, EXTENDED RELEASE ORAL DAILY
Qty: 30 CAPSULE | Refills: 0 | Status: SHIPPED | OUTPATIENT
Start: 2024-12-19

## 2025-01-05 ASSESSMENT — ANXIETY QUESTIONNAIRES
7. FEELING AFRAID AS IF SOMETHING AWFUL MIGHT HAPPEN: NEARLY EVERY DAY
3. WORRYING TOO MUCH ABOUT DIFFERENT THINGS: NEARLY EVERY DAY
5. BEING SO RESTLESS THAT IT IS HARD TO SIT STILL: MORE THAN HALF THE DAYS
2. NOT BEING ABLE TO STOP OR CONTROL WORRYING: NEARLY EVERY DAY
7. FEELING AFRAID AS IF SOMETHING AWFUL MIGHT HAPPEN: NEARLY EVERY DAY
GAD7 TOTAL SCORE: 19
6. BECOMING EASILY ANNOYED OR IRRITABLE: MORE THAN HALF THE DAYS
8. IF YOU CHECKED OFF ANY PROBLEMS, HOW DIFFICULT HAVE THESE MADE IT FOR YOU TO DO YOUR WORK, TAKE CARE OF THINGS AT HOME, OR GET ALONG WITH OTHER PEOPLE?: EXTREMELY DIFFICULT
GAD7 TOTAL SCORE: 19
1. FEELING NERVOUS, ANXIOUS, OR ON EDGE: NEARLY EVERY DAY
IF YOU CHECKED OFF ANY PROBLEMS ON THIS QUESTIONNAIRE, HOW DIFFICULT HAVE THESE PROBLEMS MADE IT FOR YOU TO DO YOUR WORK, TAKE CARE OF THINGS AT HOME, OR GET ALONG WITH OTHER PEOPLE: EXTREMELY DIFFICULT
GAD7 TOTAL SCORE: 19
4. TROUBLE RELAXING: NEARLY EVERY DAY

## 2025-01-05 ASSESSMENT — PATIENT HEALTH QUESTIONNAIRE - PHQ9
10. IF YOU CHECKED OFF ANY PROBLEMS, HOW DIFFICULT HAVE THESE PROBLEMS MADE IT FOR YOU TO DO YOUR WORK, TAKE CARE OF THINGS AT HOME, OR GET ALONG WITH OTHER PEOPLE: EXTREMELY DIFFICULT
SUM OF ALL RESPONSES TO PHQ QUESTIONS 1-9: 9
SUM OF ALL RESPONSES TO PHQ QUESTIONS 1-9: 9

## 2025-01-06 ENCOUNTER — VIRTUAL VISIT (OUTPATIENT)
Dept: PSYCHOLOGY | Facility: CLINIC | Age: 30
End: 2025-01-06
Payer: COMMERCIAL

## 2025-01-06 DIAGNOSIS — F41.1 GENERALIZED ANXIETY DISORDER: ICD-10-CM

## 2025-01-06 DIAGNOSIS — F31.9 BIPOLAR I DISORDER (H): Primary | ICD-10-CM

## 2025-01-06 DIAGNOSIS — F90.2 ADHD (ATTENTION DEFICIT HYPERACTIVITY DISORDER), COMBINED TYPE: ICD-10-CM

## 2025-01-06 PROCEDURE — 90834 PSYTX W PT 45 MINUTES: CPT | Mod: 95 | Performed by: SOCIAL WORKER
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
## 2025-01-08 ASSESSMENT — SLEEP AND FATIGUE QUESTIONNAIRES
HOW LIKELY ARE YOU TO NOD OFF OR FALL ASLEEP WHILE SITTING QUIETLY AFTER LUNCH WITHOUT ALCOHOL: WOULD NEVER DOZE
HOW LIKELY ARE YOU TO NOD OFF OR FALL ASLEEP WHILE WATCHING TV: WOULD NEVER DOZE
HOW LIKELY ARE YOU TO NOD OFF OR FALL ASLEEP WHILE SITTING AND TALKING TO SOMEONE: WOULD NEVER DOZE
HOW LIKELY ARE YOU TO NOD OFF OR FALL ASLEEP WHILE LYING DOWN TO REST IN THE AFTERNOON WHEN CIRCUMSTANCES PERMIT: SLIGHT CHANCE OF DOZING
HOW LIKELY ARE YOU TO NOD OFF OR FALL ASLEEP WHILE SITTING AND READING: WOULD NEVER DOZE
HOW LIKELY ARE YOU TO NOD OFF OR FALL ASLEEP IN A CAR, WHILE STOPPED FOR A FEW MINUTES IN TRAFFIC: WOULD NEVER DOZE
HOW LIKELY ARE YOU TO NOD OFF OR FALL ASLEEP WHEN YOU ARE A PASSENGER IN A CAR FOR AN HOUR WITHOUT A BREAK: WOULD NEVER DOZE
HOW LIKELY ARE YOU TO NOD OFF OR FALL ASLEEP WHILE SITTING INACTIVE IN A PUBLIC PLACE: WOULD NEVER DOZE

## 2025-01-09 ENCOUNTER — VIRTUAL VISIT (OUTPATIENT)
Dept: SLEEP MEDICINE | Facility: CLINIC | Age: 30
End: 2025-01-09
Payer: COMMERCIAL

## 2025-01-09 VITALS — BODY MASS INDEX: 27.22 KG/M2 | WEIGHT: 201 LBS | HEIGHT: 72 IN

## 2025-01-09 DIAGNOSIS — G47.33 OSA (OBSTRUCTIVE SLEEP APNEA): Primary | ICD-10-CM

## 2025-01-09 ASSESSMENT — PAIN SCALES - GENERAL: PAINLEVEL_OUTOF10: NO PAIN (0)

## 2025-01-09 NOTE — NURSING NOTE
Current patient location: Patient declined to provide     Is the patient currently in the state of [place]? YES    Visit mode: VIDEO    If the visit is dropped, the patient can be reconnected by:VIDEO VISIT: Send to e-mail at: [email or web address]    Will anyone else be joining the visit? NO  (If patient encounters technical issues they should call [phone])    Are changes needed to the allergy or medication list? No    Are refills needed on medications prescribed by this physician? NO    Rooming Documentation:  Questionnaire(s) completed    Reason for visit: THOMAS MICHAELF

## 2025-01-09 NOTE — PROGRESS NOTES
"Virtual Visit Details  Type of service: Video Visit   Start time: 9:27 AM  End time: 9:41 AM  Originating Location (pt. Location): Other at work  Distant Location (provider location): Off-site  Platform used for Video Visit: [facility]    Sleep Apnea - Follow-up Visit:    Impression/Plan:  (G47.33) CARLOS (obstructive sleep apnea) (primary encounter diagnosis)  Comment: Lux \"Regino\"TASH Bailey presents for follow-up of his mild sleep apnea, managed with CPAP. He is here to document compliance with his machine received on 09/16/2024. He previously met compliance 10/23/2024-11/21/2024. Usage has been lower over the last month due to a recent move. In October and November, he used his machine 30/30 days and 90% of days > 4 hours of use. He feels like he could use more pressure. His download shows his apnea is well controlled with a residual AHI of 4.3 events per hour. His leak is acceptable. He is interested in trying a different style CPAP mask as the nasal pillows cause some skin irritation. He has lost 31 lbs since the night of his sleep study.    Plan: Sleep Dental Referral, Comprehensive DME  Adjusted his CPAP pressures from 5-15 cmH2O to 9-15 cmH2O for comfort. A prescription was written for new supplies. We reviewed recommendations for cleaning and replacing supplies. We also reviewed compliance goals. I did recommend repeating a sleep study to reevaluate severity of apnea after weight loss; however, Regino is still noticing benefit from his CPAP machine and he would like to lose about 20 more lbs. We can address this again at his visit next year. He is interested in an oral appliance since he does grind his teeth too. A sleep dental referral was placed. Informed Will he could use an oral appliance as an alternative to CPAP.      Lux Bailey will follow up in about 1 year(s).     Total time spent reviewing medical records, history and physical examination, review of previous testing and interpretation as well as " documentation on this date: 30 minutes     CC: William Ramos NP    History of Present Illness:  Chief Complaint   Patient presents with    RECHECK     Lux Bailey presents for follow-up of his mild sleep apnea, managed with CPAP. He is here to document compliance with his machine received on 09/16/2024. He previously met compliance 10/23/2024-11/21/2024. Usage has been lower over the last month due to a recent move. In October and November, he used his machine 30/30 days and 90% of days > 4 hours of use.     His friends say he is snoring less at this time.     HST on 09/24/2022 at 232 lbs.- pAHI 10.9 events per hour, REM pAHI 18.3 events per hour. Sleep associated hypoxemia was not present.     DME: [facility]    Do you use a CPAP Machine at home: (Patient-Rptd) Yes  Overall, on a scale of 0-10 how would you rate your CPAP (0 poor, 10 great):      What type of mask do you use: (Patient-Rptd) Nasal Pillow The pillows work fine, but if he doesn't wash them every morning he will get skin irritation to his nares.   Is your mask comfortable:    If not, why:      Is your mask leaking: (Patient-Rptd) No  If yes, where do you feel it:    How many night per week does the mask leak (0-7):      Do you notice snoring with mask on: (Patient-Rptd) No  Do you notice gasping arousals with mask on: (Patient-Rptd) No  Are you having significant oral or nasal dryness: (Patient-Rptd) No  Is the pressure setting comfortable: (Patient-Rptd) No  If not, why: (Patient-Rptd) Feels like it needs to be stronger    What is your typical bedtime: (Patient-Rptd) 11pm  How long does it take you to go to sleep on PAP therapy: (Patient-Rptd) 2-5 min  What time do you typically get out of bed for the day: (Patient-Rptd) 5  How many hours on average per night are you using PAP therapy: (Patient-Rptd) ~6  How many hours are you sleeping per night: (Patient-Rptd) ~6  Do you feel well rested in the morning: (Patient-Rptd) Yes    He does not wake in  the middle of the night.     ResMed AirSense 11  Auto-PAP 5.0 - 15.0 cmH2O 30 day usage data: 12/01/2024-12/30/2024    40% of days with > 4 hours of use. 15/30 days with no use.   Average use 4 hours 52 minutes per day.   95%ile Leak 1.0 L/min.   CPAP 95% pressure 9.7 cm.   AHI 4.3 events per hour. (CA 1.9, OA 2.2)     EPWORTH SLEEPINESS SCALE         1/8/2025     8:58 AM    Walton Sleepiness Scale ( OLEKSANDR Webster  [id number]<br>ESS - [place]/English - Final version - 21 Nov 07 - [facility] [place].)   Sitting and reading Would never doze   Watching TV Would never doze   Sitting, inactive in a public place (e.g. a theatre or a meeting) Would never doze   As a passenger in a car for an hour without a break Would never doze   Lying down to rest in the afternoon when circumstances permit Slight chance of dozing   Sitting and talking to someone Would never doze   Sitting quietly after a lunch without alcohol Would never doze   In a car, while stopped for a few minutes in traffic Would never doze   Walton Score (MC) 1   Walton Score (Sleep) 1        Patient-reported       INSOMNIA SEVERITY INDEX (YOGESH)          1/8/2025     8:56 AM   Insomnia Severity Index (YOGESH)   Difficulty falling asleep 0   Difficulty staying asleep 0   Problems waking up too early 0   How SATISFIED/DISSATISFIED are you with your CURRENT sleep pattern? 2   How NOTICEABLE to others do you think your sleep problem is in terms of impairing the quality of your life? 2   How WORRIED/DISTRESSED are you about your current sleep problem? 0   To what extent do you consider your sleep problem to INTERFERE with your daily functioning (e.g. daytime fatigue, mood, ability to function at work/daily chores, concentration, memory, mood, etc.) CURRENTLY? 0   YOGESH Total Score 4        Patient-reported       Guidelines for Scoring/Interpretation:  Total score categories:  0-7 = No clinically significant insomnia   8-14 = Subthreshold insomnia   15-21 = Clinical insomnia  (moderate severity)  22-28 = Clinical insomnia (severe)  Used via courtesy of www.myhealth.[place].gov with permission from Nigel Zepeda PhD., [facility]      Past medical/surgical history, family history, social history, medications and allergies were reviewed.        Problem List:  Patient Active Problem List    Diagnosis Date Noted    Eating disorder, unspecified type 12/04/2024     Priority: Medium    ADHD (attention deficit hyperactivity disorder), inattentive type 12/04/2024     Priority: Medium    Class 1 obesity due to excess calories without serious comorbidity with body mass index (BMI) of 31.0 to 31.9 in adult 07/09/2024     Priority: Medium    Bipolar I disorder (H) 12/11/2023     Priority: Medium        Ht 1.829 m (6')   Wt 91.2 kg (201 lb)   BMI 27.26 kg/m      ISACC Jc CNP

## 2025-01-09 NOTE — PATIENT INSTRUCTIONS
[facility]  Search engine: [email or web address]://mms.aadsm.org/members/directory/search_bootstrap.php?org_id=ADSM&   Certified in Dental Sleep Medicine    Bernard Flores  Degree: CRISELDA  [address]  [address]  [place] [address]  Professional Phone: [phone]  Website: [email or web address]    Ollie Nguyen  Degree: CRISELDA  [facility]  [address]  [address]  [place] [address]  Professional Phone: [phone] Fax: [phone]    [facility]  [address] [place] [address] [address]  [address]  [place] [address]  Appointments: [phone]  Fax: [phone]    Katlyn Nelson  [facility]  [address]  [place] [address]  Professional Phone: [phone]  Website: [email or web address]      Ryan Forbes   [facility]   Degree: MD CRISELDA   [address]  [address]  [place] [address]  Appointments: [phone]    William Solis  Degree: CRISELDA  [address]  [address]  [place] [address]  Professional Phone: [phone]  Fax: [phone]    Deepak Dos Santos  Degree: CRISELDA  [facility]  [address]  [address]  [place] [address]  Professional Phone: [phone]  Website: [email or web address]      Magdalena Mustafa  [facility]-you should verify insurance coverage  [address]  [address]  [place] [address]  Professional Phone: [phone]  Website: [email or web address]      Erika Lin--DOES NOT ACCEPT INSURANCE  Degree: CRISELDA--you should verify insurance coverage  [facility]  [address]  [address]  [place] [address]  Professional Phone: [phone]    Sarah Garcia  Degree: CRISELDA, PhD  [facility]  [address] [place] [address]   Appointments: [phone]   Fax: [phone]     Braeden  [facility]  Degree: DDS  [address] [place] [address]  Professional Phone: [phone]  Fax: [phone]  Website: [email or web address]      El Truong  Degree: DDS  [facility]  [address] [place] [address]    Mariona Mulet Pradera  Degree: DDS, MS  [facility]  [address] [place] [address]  Professional Phone: [phone]      Reshma Lay  Degree: DDS, MS  [facility]  [address]  [address]  [place] [address]  Professional Phone: [phone]    Elissa Mcnally  Degree: CINDYS  [facility]  [address]  [place] [address]  Appointments: [phone]    Delroy Rodriguez  Degree: DDS  [facility]  [address] [place] [address]  Professional Phone: [phone]  Website: [email or web address]      Shyam Munoz  Degree: DDS  [facility]  [address] [place] [address]  Professional Phone: [phone]  Fax: [phone]      Raulito Gomez  Degree: DDS  [facility]  [address]  [address]  [place] [address]    Rohan Lara   Degree: DDS   [facility]   [address]   [address]  [place] [address]  Phone [phone]  Website: [email or web address]    Anai Cardenas   Degree: DDS   [address]  [address]  [place] [address]  Appointments: [phone]    Mely Carrero   Degree: DDS   [address] [place] [address]   Appointments: [phone]    Link Jaimes   Degree: DDS   [address] [place] [address]   Appointments: [phone]    Jake Lorenzo   Degree: DDS   [address] [place] [address]   Appointments: [phone]    Jerry Emery   Degree: CRISELDA [facility], [address] [address]   Appointments: [phone]    Monica Garsia   Degree: CRISELDA  [address] [place] [address]  [place] [address]  Appointments: [phone]    Link Carrell   [address] [place] [place] [address]  Appointments: [phone]    Hetal Silva   Degree: DDS   [facility]   [address] [place] [address]   Appointments: [phone]    Chun Louis   Degree: CRISELDA   [address] [place] [address]   Appointments: [phone]    [place] [facility]    Degree: CRISELDA  [address]  [address]  [place] [address]  Appointments: [phone]    Cortez Cruz   Degree: CRISELDA   [facility]   [address]  [address]  [place] [address]  Appointment: [phone]    Luna Girl   Degree: MS PATRICIA, FAASALOMON   [facility]  [address]   [address]  [place] [address]  Appointments: [phone]    Ryan Horton   Degree: CRISELDA   [address]   [place] [address]   Appointments: [phone]   Fax: [phone]        [employer]IC[employer]  Teddy Montejo DDS  [address], [place] [address]  [phone]; [phone] (fax)  [place]    Derik Coleman DDS, MS   [facility] [place] [address]   [address]   [place] [address]   Appointments: [phone]   Fax: [phone]     Jefferson Garcia   Degree: DMD, MSD   [facility]   [address]  [address]  [place] [address]  Appointments: [phone]  Fax: [phone]      ADDITIONAL PROVIDERS    Iggy Barba DDS    [facility]  [address] [place] [address]  Appointments: [phone]     [facility]  [address]  [address]  [place] [address]  Appointments: [phone]  Fax- [phone]    Kavita Regalado   Degree: DDS   [facility]    [address]  [place] [address]  Appointments: [phone]

## 2025-01-22 ENCOUNTER — MYC MEDICAL ADVICE (OUTPATIENT)
Dept: PSYCHIATRY | Facility: CLINIC | Age: 30
End: 2025-01-22
Payer: COMMERCIAL

## 2025-01-22 DIAGNOSIS — F31.70 BIPOLAR I DISORDER IN REMISSION: ICD-10-CM

## 2025-01-22 DIAGNOSIS — F41.9 ANXIETY: ICD-10-CM

## 2025-01-22 DIAGNOSIS — F90.0 ATTENTION DEFICIT HYPERACTIVITY DISORDER (ADHD), PREDOMINANTLY INATTENTIVE TYPE: ICD-10-CM

## 2025-01-22 RX ORDER — DEXTROAMPHETAMINE SACCHARATE, AMPHETAMINE ASPARTATE MONOHYDRATE, DEXTROAMPHETAMINE SULFATE AND AMPHETAMINE SULFATE 5; 5; 5; 5 MG/1; MG/1; MG/1; MG/1
20 CAPSULE, EXTENDED RELEASE ORAL DAILY
Qty: 30 CAPSULE | Refills: 0 | Status: SHIPPED | OUTPATIENT
Start: 2025-01-22

## 2025-01-22 RX ORDER — LITHIUM CARBONATE 300 MG/1
600 TABLET, FILM COATED, EXTENDED RELEASE ORAL DAILY
Qty: 180 TABLET | Refills: 0 | Status: SHIPPED | OUTPATIENT
Start: 2025-01-22

## 2025-01-22 NOTE — TELEPHONE ENCOUNTER
Last seen in December 2024. Last scripts for lithium and sertraline are from July 2024 for a 90-day supply. RN sent MyC message to assess any lapses.     Date of Last Office Visit: 12/2/25  Date of Next Office Visit:  1/27/25  No shows since last visit: No  More than one patient-initiated cancellation (with reschedule) since last seen in clinic? No    [x]Medication unable to be refilled by RN due to criteria not met as indicated below:      [x]Controlled medication   [x]Medication not included in policy    Medication(s) requested:     amphetamine-dextroamphetamine (ADDERALL XR) 20 MG 24 hr capsule 30 capsule 0 12/19/2024 -- No   Sig - Route: Take 1 capsule (20 mg) by mouth daily. - Oral     lithium ER (LITHOBID) 300 MG CR tablet 180 tablet 0 7/3/2024 -- No   Sig - Route: Take 2 tablets (600 mg) by mouth daily - Oral     sertraline (ZOLOFT) 50 MG tablet 90 tablet 0 7/3/2024 -- No   Sig - Route: Take 1 tablet (50 mg) by mouth daily - Oral     Any Controlled Substance(s)? Yes. Provider to review    Requested medication(s) verified as identical to current order? Yes    Any lapse in adherence to medication(s) greater than 5 days? Unknown     Additional action taken? contacted patient via [email or web address]   .      Last visit treatment plan:   Plan      1.ADHD  : Continue Adderall 20 mg daily  2.  Bipolar-continue lithium 300 mg twice daily  3.  Depression-continue sertraline 50 mg daily-patient is considering weaning off in the next few months  4.  Highly recommend psychotherapy- Is now seeing a therapist - meets bi-weekly   5.  Return to the clinic in approximately 12 weeks calling between visits any questions or concerns    Any medication(s) require lab monitoring? Yes   LITHIUM   Last Lithium Level:   Lithium   Date Value Ref Range Status   03/07/2024 0.35 (L) 0.60 - 1.20 mmol/L Final     Comment:     Therapeutic: 0.60 - 1.20 mmol/L;   Toxic: >2.00 mmol/L     Last BMP/CMP:   Sodium   Date Value Ref Range Status   09/06/2024 141  135 - 145 mmol/L Final     Potassium   Date Value Ref Range Status   09/06/2024 3.8 3.4 - 5.3 mmol/L Final     Chloride   Date Value Ref Range Status   09/06/2024 104 98 - 107 mmol/L Final     Carbon Dioxide (CO2)   Date Value Ref Range Status   09/06/2024 26 22 - 29 mmol/L Final     Anion Gap   Date Value Ref Range Status   09/06/2024 11 7 - 15 mmol/L Final     Urea Nitrogen   Date Value Ref Range Status   09/06/2024 15.9 6.0 - 20.0 mg/dL Final     Creatinine   Date Value Ref Range Status   09/06/2024 1.23 (H) 0.67 - 1.17 mg/dL Final     GFR Estimate   Date Value Ref Range Status   09/06/2024 83 >60 mL/min/1.73m2 Final     Comment:     eGFR calculated using 2021 CKD-EPI equation.     Calcium   Date Value Ref Range Status   09/06/2024 9.5 8.8 - 10.4 mg/dL Final     Comment:     Reference intervals for this test were updated on 7/16/2024 to reflect our healthy population more accurately. There may be differences in the flagging of prior results with similar values performed with this method. Those prior results can be interpreted in the context of the updated reference intervals.     Glucose   Date Value Ref Range Status   09/06/2024 91 70 - 99 mg/dL Final      Last TSH with Free T4 Reflex:   TSH   Date Value Ref Range Status   12/11/2023 1.27 0.30 - 4.20 uIU/mL Final

## 2025-01-23 ENCOUNTER — VIRTUAL VISIT (OUTPATIENT)
Dept: PSYCHOLOGY | Facility: CLINIC | Age: 30
End: 2025-01-23
Payer: COMMERCIAL

## 2025-01-23 DIAGNOSIS — F41.1 GENERALIZED ANXIETY DISORDER: ICD-10-CM

## 2025-01-23 DIAGNOSIS — F90.2 ADHD (ATTENTION DEFICIT HYPERACTIVITY DISORDER), COMBINED TYPE: ICD-10-CM

## 2025-01-23 DIAGNOSIS — F31.9 BIPOLAR I DISORDER (H): Primary | ICD-10-CM

## 2025-01-23 PROCEDURE — 90834 PSYTX W PT 45 MINUTES: CPT | Mod: 95 | Performed by: SOCIAL WORKER

## 2025-01-27 NOTE — PROGRESS NOTES
[facility]                                     Progress Note    Patient Name: Lux Bailey  Date: 1/23/2025         Service Type: Individual      Session Start Time:  3:02pm Session End Time: 3:48pm     Session Length: 46 minutes    Session #: 6    Attendees: Client    Service Modality:  Video Visit:      Provider verified identity through the following two step process.  Patient provided:  Patient is known previously to provider and Patient was verified at admission/transfer    Telemedicine Visit: The patient's condition can be safely assessed and treated via synchronous audio and visual telemedicine encounter.      Reason for Telemedicine Visit: Patient has requested telehealth visit    Originating Site (Patient Location): Patient's home    Distant Site (Provider Location): Provider Remote Setting- Home Office    Consent:  The patient/guardian has verbally consented to: the potential risks and benefits of telemedicine (video visit) versus in person care; bill my insurance or make self-payment for services provided; and responsibility for payment of non-covered services.     Patient would like the video invitation sent by:  Send to e-mail at: [email or web address]    Mode of Communication:  Video Conference via Amwell    Distant Location (Provider):  Off-site    As the provider I attest to compliance with applicable laws and regulations related to telemedicine.    DATA  Interactive Complexity: No  Crisis: No        Progress Since Last Session (Related to Symptoms / Goals / Homework):   Symptoms: Improving Patient reports that anxiety and mood symptoms have stabilized since the time of the previous appointment, noting that previous stressors have decreased in intensity over the past couple of weeks.     Homework: Achieved / completed to satisfaction      Episode of Care Goals: Satisfactory progress - ACTION (Actively working towards change); Intervened by reinforcing change plan /  affirming steps taken     Current / Ongoing Stressors and Concerns:    Patient reports a history of challenges with mood and anxiety symptoms, and well as focus and concentration. He also reports a history of disordered eating patterns, all of which has impacted his mental health and functioning. Patient reports that he will returning to school this year, and also works full-time. Additionally, patient reports that his family lives in another part of the country, and notes that it is sometimes difficult to live so far away from one another. He reports that his childhood was difficult at times, as the family lived in poverty, and his father struggled with substance use. Patient reports improvement to the reported mood and anxiety symptoms since the time of his previous appointment, noting that previous stressors have stabilized over the past couple of weeks. Patient states that his roommate has sought out appropriate mental health care, and he is hopeful that they will continue to access the appropriate services. Patient also reports that his concerns for his mother and brother's living situation have subsided over the past couple of weeks as well. Patient has been focusing on what is within his control, staying in the present, and engaging in tasks and activities that are meaningful for him, all of which has been helpful in managing his own mental health.      Treatment Objective(s) Addressed in This Session:   1) Patient will identify at least 3-4 worries or thought patterns that contribute to feeling anxious or depressed.  2) Patient will learn at least 3-4 new coping skills for anxiety and depression symptom management, and will practice using these skills at least 1-2 times daily.  3) Patient will identify 3-5 thought patterns which contribute to binge eating.  4) Patient will  be able to effectively use manage self-critical thoughts related to binge eating or body image, as they occur, and/or accept a  compliment, at least 3-5 times weekly.                Intervention:   Motivational Interviewing     MI Intervention: Expressed Empathy/Understanding, Supported Autonomy, Collaboration, Evocation, Open-ended questions, Reflections: simple and complex, Change talk (evoked), and Reframe      Change Talk Expressed by the Patient: Desire to change Ability to change Reasons to change Need to change Committment to change Activation Taking steps     Provider Response to Change Talk: E - Evoked more info from patient about behavior change, A - Affirmed patient's thoughts, decisions, or attempts at behavior change, R - Reflected patient's change talk, and S - Summarized patient's change talk statements     Psychodynamic: This writer used open ended questions and reflective listening to assist patient in processing his thoughts and emotions as related to the reported symptoms and presenting concerns.     Assessments completed prior to visit:  The following assessments were completed by patient for this visit:  PHQ9:       4/4/2024     9:39 PM 7/3/2024     9:00 AM 8/29/2024     8:17 AM 9/10/2024     6:02 AM 11/18/2024     6:07 AM 12/2/2024     8:27 AM 1/5/2025     7:45 PM   PHQ-9 SCORE   PHQ-9 Total Score MyChart 5 (Mild depression)  5 (Mild depression) 8 (Mild depression) 3 (Minimal depression) 4 (Minimal depression) 9 (Mild depression)   PHQ-9 Total Score 5 5 5 8 3  4  9        Patient-reported     GAD7:       3/6/2024     2:00 PM 4/4/2024     9:40 PM 8/29/2024     8:18 AM 12/2/2024     8:28 AM 1/5/2025     7:45 PM   AMAURY-7 SCORE   Total Score  4 (minimal anxiety) 4 (minimal anxiety) 13 (moderate anxiety) 19 (severe anxiety)   Total Score 10 4 4 13  19        Patient-reported         ASSESSMENT: Current Emotional / Mental Status (status of significant symptoms):   Risk status (Self / Other harm or suicidal ideation)   Patient denies current fears or concerns for personal safety.   Patient denies current or recent suicidal  ideation or behaviors.   Patient denies current or recent homicidal ideation or behaviors.   Patient denies current or recent self injurious behavior or ideation.   Patient denies other safety concerns.   Patient reports there has been no change in risk factors since their last session.     Patient reports there has been no change in protective factors since their last session.     Recommended that patient call 911 or go to the local ED should there be a change in any of these risk factors     Appearance:   Appropriate    Eye Contact:   Good    Psychomotor Behavior: Normal    Attitude:   Cooperative  Interested Attentive   Orientation:   All   Speech    Rate / Production: Normal     Volume:  Normal    Mood:    Normal   Affect:    Appropriate    Thought Content:  Clear    Thought Form:  Coherent  Logical    Insight:    Good      Medication Review:   No changes to current psychiatric medication(s)     Medication Compliance:   Yes     Changes in Health Issues:   None reported     Chemical Use Review:   Substance Use: Chemical use reviewed, no active concerns identified      Tobacco Use: No current tobacco use.      Diagnosis:  F31.9 Bi Polar I Disorder  F41.1 Generalized Anxiety Disorder  F90.2 Attention Deficit Hyperactivity Disorder, Combined Presentation     Collateral Reports Completed:   Not Applicable    PLAN: (Patient Tasks / Therapist Tasks / Other)  Between now and his next appointment, patient will engage in identified helpful coping skills to assist him in managing the reported symptoms and presenting concerns.        Torrie De Leon, [facility]                                                         ______________________________________________________________________    Individual Treatment Plan     Patient's Name: Lux Bailey               YOB: 1995     Date of Creation: 10/7/2024  Date Treatment Plan Last Reviewed/Revised: 1/6/2025   DSM5 Diagnoses: Attention-Deficit/Hyperactivity  "Disorder  314.01 (F90.2) Combined presentation, 296.52 Bipolar I Disorder Current or Most Recent Episode Depressed, Moderate, or 300.02 (F41.1) Generalized Anxiety Disorder  Psychosocial / Contextual Factors: Patient reports a history of challenges with mood and anxiety symptoms, and well as focus and concentration. He also reports a history of disordered eating patterns, all of which has impacted his mental health and functioning. Patient reports that he will returning to school this year, and also works full-time. Additionally, patient reports that his family lives in another part of the country, and notes that it is sometimes difficult to live so far away from one another. He reports that his childhood was difficult at times, as the family lived in poverty, and his father struggled with substance use. He notes that he has good relationships with family overall at this time.   PROMIS (reviewed every 90 days): completed on 1/6/2025 with a score of 22     Referral / Collaboration:  Referral to another professional/service is not indicated at this time.     Anticipated number of session for this episode of care:  18-24 sessions  Anticipation frequency of session: Every other week  Anticipated Duration of each session: 38-52 minutes  Treatment plan will be reviewed in 90 days or when goals have been changed.         MeasurableTreatment Goal(s) related to diagnosis / functional impairment(s)  Goal 1: Patient will further develop helpful coping skills to assist her with management of depression and anxiety symptoms, and practice using these skills at least 1-2 times daily, as reported by her, over 3 months' time.   \"I'll know I\"ve met my goal when I have specific skills for managing ADHD symptoms.\"      Objective #A (Patient Action)                        Patient will identify at least 3-4 worries or thought patterns that contribute to feeling anxious or depressed.  Status: Continued - Date: 1/6/2025   " "  Intervention(s)  Therapist will ask patient to track her symptoms, to identify any potential patterns with thoughts, emotions, triggers, situations/circumstances.     Objective #B  Patient will learn at least 3-4 new coping skills for anxiety and depression symptom management, and will practice using these skills at least 1-2 times daily.  Status: Continued - Date: 1/6/2025     Intervention(s)  Therapist will teach and practice mindfulness based and cognitive coping skills in session with patient, and will ask her to practice these skills in between appointments as well.         Goal 2: Patient will be able to effectively address thoughts and feelings related to binge eating disorder, at least 3-5 times weekly, as reported by him, over 3 months' time.     I will know I've met my goal when I'm able to address feelings of guilt related to binge eating, and to not be so hard on myself.\"      Objective #A (Patient Action)                          Status: Continued- Date: 1/6/2025     Patient will identify 3-5 thought patterns which contribute to binge eating.     Intervention(s)  Therapist will  ask patient to track symptoms, to identify potential patterns with triggers, thought patterns, emotions, body cues, etc related to binge eating .     Objective #B  Patient will  be able to effectively use manage self-critical thoughts related to binge eating or body image, as they occur, and/or accept a compliment, at least 3-5 times weekly .                        Status: Continued - Date: 1/6/2025       Intervention(s)  Therapist will assist patient in identifying self-affirming language and other strategies for management of self-critical thought patterns that resonate with him, and will ask him to practice using these skills outside of session.            Patient has reviewed and agreed to the above plan.        Torrie De Leon, [facility]                 January 6, 2025      "

## 2025-02-06 ENCOUNTER — VIRTUAL VISIT (OUTPATIENT)
Dept: PSYCHOLOGY | Facility: CLINIC | Age: 30
End: 2025-02-06
Payer: COMMERCIAL

## 2025-02-06 DIAGNOSIS — F41.1 GENERALIZED ANXIETY DISORDER: ICD-10-CM

## 2025-02-06 DIAGNOSIS — F31.9 BIPOLAR I DISORDER (H): Primary | ICD-10-CM

## 2025-02-06 DIAGNOSIS — F90.2 ADHD (ATTENTION DEFICIT HYPERACTIVITY DISORDER), COMBINED TYPE: ICD-10-CM

## 2025-02-06 PROCEDURE — 90834 PSYTX W PT 45 MINUTES: CPT | Mod: 95 | Performed by: SOCIAL WORKER

## 2025-02-06 ASSESSMENT — PATIENT HEALTH QUESTIONNAIRE - PHQ9
SUM OF ALL RESPONSES TO PHQ QUESTIONS 1-9: 10
SUM OF ALL RESPONSES TO PHQ QUESTIONS 1-9: 10
10. IF YOU CHECKED OFF ANY PROBLEMS, HOW DIFFICULT HAVE THESE PROBLEMS MADE IT FOR YOU TO DO YOUR WORK, TAKE CARE OF THINGS AT HOME, OR GET ALONG WITH OTHER PEOPLE: SOMEWHAT DIFFICULT

## 2025-02-09 NOTE — PROGRESS NOTES
[facility]                                     Progress Note    Patient Name: Lux Bailey  Date: 2/6/2025         Service Type: Individual      Session Start Time: 8:02am  Session End Time: 8:47am     Session Length: 45 minutes    Session #: 7    Attendees: Client    Service Modality:  Video Visit:      Provider verified identity through the following two step process.  Patient provided:  Patient is known previously to provider and Patient was verified at admission/transfer    Telemedicine Visit: The patient's condition can be safely assessed and treated via synchronous audio and visual telemedicine encounter.      Reason for Telemedicine Visit: Patient has requested telehealth visit    Originating Site (Patient Location): Patient's place of employment    Distant Site (Provider Location): Provider Remote Setting- Home Office    Consent:  The patient/guardian has verbally consented to: the potential risks and benefits of telemedicine (video visit) versus in person care; bill my insurance or make self-payment for services provided; and responsibility for payment of non-covered services.     Patient would like the video invitation sent by:  Send to e-mail at: [email or web address]    Mode of Communication:  Video Conference via Amwell    Distant Location (Provider):  Off-site    As the provider I attest to compliance with applicable laws and regulations related to telemedicine.    DATA  Interactive Complexity: No  Crisis: No        Progress Since Last Session (Related to Symptoms / Goals / Homework):   Symptoms: Improving Patient reports some improvement to the reported symptoms and presenting concerns.    Homework: Achieved / completed to satisfaction      Episode of Care Goals: Satisfactory progress - ACTION (Actively working towards change); Intervened by reinforcing change plan / affirming steps taken     Current / Ongoing Stressors and Concerns:    Patient reports a history of  challenges with mood and anxiety symptoms, and well as focus and concentration. He also reports a history of disordered eating patterns, all of which has impacted his mental health and functioning. Patient reports that he will returning to school this year, and also works full-time. Additionally, patient reports that his family lives in another part of the country, and notes that it is sometimes difficult to live so far away from one another. He reports that his childhood was difficult at times, as the family lived in poverty, and his father struggled with substance use. Patient reports continued improvement to the reported mood and anxiety symptoms since the time of his previous appointment, stating that previous stressors have continued to stabilize over the past couple of weeks, which has felt like a relief. Patient states that his roommate has continued to seek out and engage in appropriate mental health care, and he is hopeful that they will continue to access the appropriate services. They were recently able to have some good conversations, which has had a positive impact on patient's mental health as well. Patient reports some recent work related stress, and experiencing frustration in his full time job, and considering what his long term options might be, as well as trying to maintain a sense of balance now that he is working two jobs. Patient reports ongoing concerns for his mother and brother's living situation though notes that this has also continued to stabilize over the past couple of weeks. Patient has been continuing to focus on what is within his control, staying in the present, and engaging in tasks and activities that are meaningful for him, all of which has been helpful in managing his own mental health.      Treatment Objective(s) Addressed in This Session:   1) Patient will identify at least 3-4 worries or thought patterns that contribute to feeling anxious or depressed.  2) Patient will learn  at least 3-4 new coping skills for anxiety and depression symptom management, and will practice using these skills at least 1-2 times daily.  3) Patient will identify 3-5 thought patterns which contribute to binge eating.  4) Patient will  be able to effectively use manage self-critical thoughts related to binge eating or body image, as they occur, and/or accept a compliment, at least 3-5 times weekly.       Intervention:    Motivational Interviewing     MI Intervention: Expressed Empathy/Understanding, Supported Autonomy, Collaboration, Evocation, Open-ended questions, Reflections: simple and complex, Change talk (evoked), and Reframe      Change Talk Expressed by the Patient: Desire to change Ability to change Reasons to change Need to change Committment to change Activation Taking steps     Provider Response to Change Talk: E - Evoked more info from patient about behavior change, A - Affirmed patient's thoughts, decisions, or attempts at behavior change, R - Reflected patient's change talk, and S - Summarized patient's change talk statements     Psychodynamic: This writer used open ended questions and reflective listening to assist patient in processing his thoughts and emotions as related to the reported symptoms and presenting concerns.     Assessments completed prior to visit:  The following assessments were completed by patient for this visit:  PHQ9:       7/3/2024     9:00 AM 8/29/2024     8:17 AM 9/10/2024     6:02 AM 11/18/2024     6:07 AM 12/2/2024     8:27 AM 1/5/2025     7:45 PM 2/6/2025     2:40 AM   PHQ-9 SCORE   PHQ-9 Total Score MyChart  5 (Mild depression) 8 (Mild depression) 3 (Minimal depression) 4 (Minimal depression) 9 (Mild depression) 10 (Moderate depression)   PHQ-9 Total Score 5 5 8 3  4  9  10        Patient-reported     GAD7:       3/6/2024     2:00 PM 4/4/2024     9:40 PM 8/29/2024     8:18 AM 12/2/2024     8:28 AM 1/5/2025     7:45 PM   AMAURY-7 SCORE   Total Score  4 (minimal anxiety) 4  (minimal anxiety) 13 (moderate anxiety) 19 (severe anxiety)   Total Score 10 4 4 13  19        Patient-reported         ASSESSMENT: Current Emotional / Mental Status (status of significant symptoms):   Risk status (Self / Other harm or suicidal ideation)   Patient denies current fears or concerns for personal safety.   Patient denies current or recent suicidal ideation or behaviors.   Patient denies current or recent homicidal ideation or behaviors.   Patient denies current or recent self injurious behavior or ideation.   Patient denies other safety concerns.   Patient reports there has been no change in risk factors since their last session.     Patient reports there has been no change in protective factors since their last session.     Recommended that patient call 911 or go to the local ED should there be a change in any of these risk factors     Appearance:   Appropriate    Eye Contact:   Good    Psychomotor Behavior: Normal    Attitude:   Cooperative  Interested Attentive   Orientation:   All   Speech    Rate / Production: Normal     Volume:  Normal    Mood:    Normal   Affect:    Appropriate    Thought Content:  Clear    Thought Form:  Coherent  Logical    Insight:    Good      Medication Review:   No changes to current psychiatric medication(s)     Medication Compliance:   Yes     Changes in Health Issues:   None reported     Chemical Use Review:   Substance Use: Chemical use reviewed, no active concerns identified      Tobacco Use: No current tobacco use.      Diagnosis:  F31.9 Bi Polar I Disorder  F41.1 Generalized Anxiety Disorder  F90.2 Attention Deficit Hyperactivity Disorder, Combined Presentation     Collateral Reports Completed:   Not Applicable    PLAN: (Patient Tasks / Therapist Tasks / Other)  Between now and his next appointment, patient will engage in identified helpful coping skills to assist him in managing the reported symptoms and presenting concerns.           LESLYE TrinidadSW                                                          ______________________________________________________________________       Individual Treatment Plan     Patient's Name: Lux Bailey               YOB: 1995     Date of Creation: 10/7/2024  Date Treatment Plan Last Reviewed/Revised: 1/6/2025   DSM5 Diagnoses: Attention-Deficit/Hyperactivity Disorder  314.01 (F90.2) Combined presentation, 296.52 Bipolar I Disorder Current or Most Recent Episode Depressed, Moderate, or 300.02 (F41.1) Generalized Anxiety Disorder  Psychosocial / Contextual Factors: Patient reports a history of challenges with mood and anxiety symptoms, and well as focus and concentration. He also reports a history of disordered eating patterns, all of which has impacted his mental health and functioning. Patient reports that he will returning to school this year, and also works full-time. Additionally, patient reports that his family lives in another part of the country, and notes that it is sometimes difficult to live so far away from one another. He reports that his childhood was difficult at times, as the family lived in poverty, and his father struggled with substance use. He notes that he has good relationships with family overall at this time.   PROMIS (reviewed every 90 days): completed on 1/6/2025 with a score of 22     Referral / Collaboration:  Referral to another professional/service is not indicated at this time.     Anticipated number of session for this episode of care:  18-24 sessions  Anticipation frequency of session: Every other week  Anticipated Duration of each session: 38-52 minutes  Treatment plan will be reviewed in 90 days or when goals have been changed.         MeasurableTreatment Goal(s) related to diagnosis / functional impairment(s)  Goal 1: Patient will further develop helpful coping skills to assist her with management of depression and anxiety symptoms, and practice using these skills at least 1-2  "times daily, as reported by her, over 3 months' time.   \"I'll know I\"ve met my goal when I have specific skills for managing ADHD symptoms.\"      Objective #A (Patient Action)                        Patient will identify at least 3-4 worries or thought patterns that contribute to feeling anxious or depressed.  Status: Continued - Date: 1/6/2025     Intervention(s)  Therapist will ask patient to track her symptoms, to identify any potential patterns with thoughts, emotions, triggers, situations/circumstances.     Objective #B  Patient will learn at least 3-4 new coping skills for anxiety and depression symptom management, and will practice using these skills at least 1-2 times daily.  Status: Continued - Date: 1/6/2025     Intervention(s)  Therapist will teach and practice mindfulness based and cognitive coping skills in session with patient, and will ask her to practice these skills in between appointments as well.         Goal 2: Patient will be able to effectively address thoughts and feelings related to binge eating disorder, at least 3-5 times weekly, as reported by him, over 3 months' time.     I will know I've met my goal when I'm able to address feelings of guilt related to binge eating, and to not be so hard on myself.\"      Objective #A (Patient Action)                          Status: Continued- Date: 1/6/2025     Patient will identify 3-5 thought patterns which contribute to binge eating.     Intervention(s)  Therapist will  ask patient to track symptoms, to identify potential patterns with triggers, thought patterns, emotions, body cues, etc related to binge eating .     Objective #B  Patient will  be able to effectively use manage self-critical thoughts related to binge eating or body image, as they occur, and/or accept a compliment, at least 3-5 times weekly .                        Status: Continued - Date: 1/6/2025       Intervention(s)  Therapist will assist patient in identifying self-affirming " language and other strategies for management of self-critical thought patterns that resonate with him, and will ask him to practice using these skills outside of session.            Patient has reviewed and agreed to the above plan.        Torrie De Leon, [facility]                 January 6, 2025

## 2025-02-12 DIAGNOSIS — E66.09 CLASS 1 OBESITY DUE TO EXCESS CALORIES WITHOUT SERIOUS COMORBIDITY WITH BODY MASS INDEX (BMI) OF 31.0 TO 31.9 IN ADULT: ICD-10-CM

## 2025-02-12 DIAGNOSIS — E66.811 CLASS 1 OBESITY DUE TO EXCESS CALORIES WITHOUT SERIOUS COMORBIDITY WITH BODY MASS INDEX (BMI) OF 31.0 TO 31.9 IN ADULT: ICD-10-CM

## 2025-02-19 ENCOUNTER — VIRTUAL VISIT (OUTPATIENT)
Dept: PSYCHIATRY | Facility: CLINIC | Age: 30
End: 2025-02-19
Payer: COMMERCIAL

## 2025-02-19 DIAGNOSIS — F90.0 ATTENTION DEFICIT HYPERACTIVITY DISORDER (ADHD), PREDOMINANTLY INATTENTIVE TYPE: ICD-10-CM

## 2025-02-19 DIAGNOSIS — F31.70 BIPOLAR I DISORDER IN REMISSION: Primary | ICD-10-CM

## 2025-02-19 PROCEDURE — 98005 SYNCH AUDIO-VIDEO EST LOW 20: CPT | Performed by: NURSE PRACTITIONER

## 2025-02-19 RX ORDER — DEXTROAMPHETAMINE SACCHARATE, AMPHETAMINE ASPARTATE MONOHYDRATE, DEXTROAMPHETAMINE SULFATE AND AMPHETAMINE SULFATE 5; 5; 5; 5 MG/1; MG/1; MG/1; MG/1
20 CAPSULE, EXTENDED RELEASE ORAL DAILY
Qty: 30 CAPSULE | Refills: 0 | Status: SHIPPED | OUTPATIENT
Start: 2025-02-19

## 2025-02-19 NOTE — PROGRESS NOTES
"  The patient has been notified of following:      \"This virtual  visit will be conducted via a call between you and your physician/provider. We have found that certain health care needs can be provided without the need for a physical exam.  This service lets us provide the care you need virtually/via video   If a prescription is necessary we can send it directly to your pharmacy.  If lab work is needed we can place an order for that and you can then stop by our lab to have the test done at a later time.     Virtual/Video visits are billed at different rates depending on your insurance coverage.Some insurers they may be billed the same as an in-person visit.  Please reach out to your insurance provider with any questions.    Patient has given verbal consent for virtual  visit : Yes      Ho w would you like to obtain your AVS? Mail a copy  If the video visit is dropped, the invitation should be resent by: Send to e-mail at: [email or web address]  Will anyone else be joining your video visit? No            Psychiatric  Out- Patient  Follow Up Progress Note  Date of visit:2/19/2025           Discussion of Care and Treatment Recommendations:   This is a 29 year old male with apast psychiatric history significant of bipolar 1, depression and ADHD .Pt presets to the clinic for a follow up appointment       Last visit 12/02/2024.  Recommendation at last visit.  1.ADHD  : Continue Adderall 20 mg daily  2.  Bipolar-continue lithium 300 mg twice daily  3.  Depression-continue sertraline 50 mg daily-patient is considering weaning off in the next few months  4.  Highly recommend psychotherapy- Is now seeing a therapist - meets bi-weekly   5.  Return to the clinic in approximately 12 weeks calling between visits any questions or concerns  Patient and I reviewed diagnosis and treatment plan and patient agrees with following recommendations:  Ongoing education given regarding diagnostic and treatment options with adequate " "verbalization of understanding.  Plan   1.ADHD  : Continue Adderall 20 mg daily  2.  Bipolar-continue lithium 300 mg twice daily  3.  Depression-continue sertraline 50 mg daily-4.  Highly recommend psychotherapy- Is now seeing a therapist - meets bi-weekly   5.  Return to the clinic in approximately 12 weeks calling between visits any questions or concerns         DIagnoses:      Bipolar I disorder (H)  Eating disorder, unspecified type (history)  Attention deficit hyperactivity disorder (ADHD), predominantly inattentive type  Long term use of drug        Patient Active Problem List   Diagnosis    Bipolar I disorder (H)    Class 1 obesity due to excess calories without serious comorbidity with body mass index (BMI) of 31.0 to 31.9 in adult    Eating disorder, unspecified type    ADHD (attention deficit hyperactivity disorder), inattentive type             Chief Complaint / Subjective:    Chief complaint: Bipolar disorder     History of Present Illness:   Patient reports compliance with current medications and denies side effects.  Symptoms are mainly well-managed at this time.  Has been on a weight loss program and on medication but recently stopped taking the medication after losing some pounds.  He is following closely with his primary care provider for his weight loss.  Depression and anxiety are well-managed at this time.  He meets there yearly with his therapist.  He offers no new concerns and would like to continue with current medications.  Mental Status Examination:   Appearance: Well groomed, good eye contact   Orientation: Patient alert and oriented to person, place, time, and situation  Reliability:  Patient appears to be an adequate historian.    Behavior: cooperative   Speech: Speech is spontaneous and coherent, with a normal rate, rhythm and tone.    Language:There are no difficulties with expressive or receptive language as observed throughout the interview.    Mood: Described as \"ok\".    Affect: " congruent   Judgement: Able to make basic decision regarding safety.  Insight: Good awareness of physical and mental health conditions and aware of needs around care for these.  Gait and station: unable to assess  Thought process: Logical   Thought content: No evidence of delusions or paranoia.    Hallucinations : No evidence of any hallucination  Thought content: No evidence of delusions or paranoia.   Suicidal /Homical Ideations:  No thoughts of self harm or suicide. No thoughts of harming others.  Associations: Connected  Fund of knowledge: Average  Attention / Concentration: Able to remain focused during the interview with minimal distractibility or need for redirection.  Short Term Memory: Grossly intact as evidence by client recalling themes and ideas discussed.  Long Term Memory: Intact  Motor Status: unable to asse    Drug/treatment history and current pattern of use:   Seizures/DTs/hallucinations: Denies   Past Use: Numerous drugs in college, former cannabis use. LSD, ketamine, cannabis, DMT, psylocybin, research chemicals. No opioids or benzodiazepines   Legal Consequences- None  No current use of substance use, denies any current use.   Alcohol: None  Nicotine: None  Caffeine: 600-800mg caffeine a day   Opioids: None                 Narcan Kit: N/A  THC/CBD: None     Medication changes: See Above   Medication adherence: compliant  Medication side effects: absent  Information about medications: Side effects, benefits and alternative treatments discussed and patient agrees .    Psychotherapy: Supportive therapy day-to-day living    Education: Diet, exercise, abstinence from drugs and alcohol, patient will not drive if sedated and medications or  under influence of any substance    Lab Results:   Personally reviewed and discussed with the patient    Lab Results   Component Value Date    WBC 7.6 09/06/2024    HGB 17.1 09/06/2024    HCT 51.3 09/06/2024     09/06/2024    CHOL 213 (H) 12/11/2023    TRIG  182 (H) 12/11/2023    HDL 33 (L) 12/11/2023    ALT 27 09/06/2024    AST 21 09/06/2024     09/06/2024    BUN 15.9 09/06/2024    CO2 26 09/06/2024    TSH 1.27 12/11/2023       Vital signs:  There were no vitals taken for this visit.  Telemedicine visit-no vital signs completed  Allergies: Patient has no known allergies.         Medications:     Current Outpatient Medications   Medication Sig Dispense Refill    amphetamine-dextroamphetamine (ADDERALL XR) 10 MG 24 hr capsule Take 2 capsules (20 mg) by mouth daily 60 capsule 0    amphetamine-dextroamphetamine (ADDERALL XR) 20 MG 24 hr capsule Take 1 capsule (20 mg) by mouth daily. 30 capsule 0    COMPOUNDED NON-CONTROLLED SUBSTANCE (CMPD RX) - PHARMACY TO MIX COMPOUNDED MEDICATION Semaglutide 1.5mg subcutaneously every 7 days 2 mL 1    lithium ER (LITHOBID) 300 MG CR tablet Take 2 tablets (600 mg) by mouth daily. 180 tablet 0    sertraline (ZOLOFT) 50 MG tablet Take 1 tablet (50 mg) by mouth daily. 90 tablet 0     No current facility-administered medications for this visit.         No current facility-administered medications for this visit.     No current facility-administered medications for this visit.         Medication adherence: Reviewed risk/benefits of medication , Patient able to verbalize understanding of side effects and Patient verbally consents to taking medications      PSYCHOEDUCATION:  Medication side effects and alternatives reviewed. Health promotion activities recommended and reviewed today. All questions addressed. Education and counseling completed regarding risks and benefits of medications and psychotherapy options.  Consent provided by patient/guardian  Call the psychiatric nurse line with medication questions or concerns at [phone].  MyChart may be used to communicate with your provider, but this is not intended to be used for emergencies.  SEROTONIN SYNDROME:  Discussed risks of Serotonin syndrome (ie, serotonin toxicity) which is a  potentially life-threatening condition associated with increased serotonergic activity in the central nervous system (CNS). It is seen with therapeutic medication use, inadvertent interactions between drugs, and intentional self-poisoning. Serotonin syndrome may involve a spectrum of clinical findings, which often include mental status changes, autonomic hyperactivity, and neuromuscular abnormalities.    STIMULANT THERAPY: Side effects discussed including but not limited to cardiac (including HTN, tachycardia, sudden death), motor/tic, appetite/growth, mood lability and sleep disruption. This is a controlled substance with risk for abuse, need to keep in a safe keep place and cannot replace lost scripts  HARM REDUCTION:  Discussions regarding effects of mood altering substances, alcohol and cannabis, on mood and that approach is harm reduction, will continue to prescribe meds as they work to cut back use.    SAFETY:  We all care about your loved one's safety. To reduce the risk of self-harm, remove access to all:  Firearms, Medicines (both prescribed and over-the-counter), Knives and other sharp objects, Ropes and like materials, and Alcohol  SLEEP HYGIENE: establish a sleep routine, limit screen time 1 hour prior to bed, use bed for sleep only, take sleep/medications on time (including sleepy time tea, trazadone or herbal treatments such as melatonin), aroma therapy, limit caffeine/sugar, yoga, guided imagery, stretch, meditation, limit naps to 20 minutes, make a temperature change in the room, white noise, be mindful of slowing down breathing, take a warm bath/shower, frequently wash sheets, and journaling.   Medlineplus.gov is information for patients.  It is run by the National Library of Medicine and it contains information about all disorders, diseases and all medications.              Review of Systems:      ROS:    Subjective Data Only- Tele-Health Visit    10 point ROS was negative except for the items  listed in HPI.      Crisis Resources:    Present to the Emergency Department as needed or call after hours crisis line at [phone] or [phone].   [place] Crisis Text Line: Text [place] to 872009.  Suicide LifeLine Chat: suicidepreProton Therapyline.org/chat/.  National Suicide Prevention Lifeline: 685.866.6926 (TTY: 199.671.6159). Call anytime for help.  (www.suicidepreventionlifeline.org)  National [place] on Mental Illness (www.ileana.org): 786.208.6463 or 836-695-2103.  Mental Health Association (www.mentalhealth.org): 552.951.5517 or 779-348-2329.      Coordination of Care:   More than 50% of time spent on coordination of care including: Educating patient about diagnosis, prognosis, side effects and benefits of medications, diet, exercise.  Time also spent providing supportive therapy regarding above issues.    Video-Visit Details    Type of service:  Video Visit    Originating Location (pt. Location): Home    Distant Location (provider location): Providers Remote Office     Platform used for Video Visit: Ellen      Disclaimer: This note consists of symbols derived from keyboarding, dictation and/or voice recognition software. As a result, there may be errors in the script that have gone undetected. Please consider this when interpreting information found in this chart.    Start Time : 0800  End time : 0820

## 2025-02-19 NOTE — NURSING NOTE
Current patient location: [facility] [address] [facility] [address]    Is the patient currently in the state of [place]? YES    Visit mode: VIDEO    If the visit is dropped, the patient can be reconnected by:VIDEO VISIT: Text to cell phone:   Telephone Information:   Mobile [phone]       Will anyone else be joining the visit? NO  (If patient encounters technical issues they should call [phone]    Are changes needed to the allergy or medication list? Pt stated no changes to allergies and Pt stated no med changes    Are refills needed on medications prescribed by this physician? Discuss with provider    Rooming Documentation:  Questionnaire(s) completed    Reason for visit: RECHGIA ADORNO

## 2025-02-19 NOTE — PATIENT INSTRUCTIONS
"The Panel Psychiatry Program  What to Expect  Here's what to expect in the Panel Psychiatry Program.   About the program  You'll be meeting with a psychiatric doctor to check your mental health. A psychiatric doctor helps you deal with troubling thoughts and feelings by giving you medicine. They'll make sure you know the plan for your care. You may see them for a long time. When you're feeling better, they may refer you back to seeing your family doctor.   If you have any questions, we'll be glad to talk to you.  About visits  Be open  At your visits, please talk openly about your problems. It may feel hard, but it's the best way for us to help you.  Cancelling visits  If you can't come to your visit, please call us right away at [phone]. If you don't cancel at least 24 hours (1 full day) before your visit, that's \"late cancellation.\"  Not showing up for your visits  Being very late is the same as not showing up. You'll be a \"no show\" if:  You're more than 15 minutes late for a 30-minute (half hour) visit.  You're more than 30 minutes late for a 60-minute (full hour) visit.  If you cancel late or don't show up 2 times within 6 months, we may end your care.  Getting help between visits  If you need help between visits, you can call us Monday to Friday from 8 a.m. to 4:30 p.m. at [phone].  Emergency care  Call 911 or go to the nearest emergency department if your life or someone else's life is in danger.  Call 988 anytime to reach the national Suicide and Crisis hotline.  Medicine refills  To refill your medicine, call your pharmacy. You can also call [facility]'s Behavioral Access at [phone], Monday to Friday, 8 a.m. to 4:30 p.m. It can take 1 to 3 business days to get a refill.   Forms, letters, and tests  You may have papers to fill out, like FMLA, short-term disability, and workability. We can help you with these forms at your visits, but you must have an appointment. You may need more " than 1 visit for this, to be in an intensive therapy program, or both.  Before we can give you medicine for ADHD, we may refer you to get tested for it or confirm it another way.  We may not be able to give you an emotional support animal letter.  We don't do mental health checks ordered by the court.   We don't do mental health testing, but we can refer you to get tested.   Thank you for choosing us for your care.  For informational purposes only. Not to replace the advice of your health care provider. Copyright   2022 [facility]. All rights reserved. [employer] [id number] - 12/22      After Visit Summary   Continue medications as prescribed  Have your pharmacy contact us for a refill if you are running low on medications (We may ask you to come into clinic to get a refill from the nurse  No Alcohol or drug use  No driving if sedated  Call the clinic with any questions or concerns   Reach out for help if you feel like hurting yourself or others ([facility] [phone]: [address] [facility], [address] or [facility] Suicide Hotline   [phone] , call 911 or go to nearest Emergency room     Crisis Resources:    Present to the Emergency Department as needed or call after hours crisis line at [phone] or [phone].   [place] Crisis Text Line: Text [place] to 394455.  Suicide LifeLine Chat: suicidepreventionlifeline.org/chat/.  National Suicide Prevention Lifeline: 120.760.8226 (TTY: 457.520.6248). Call anytime for help.  (www.suicidepreventionlifeline.org)  National [place] on Mental Illness (www.ileana.org): 943.607.7808 or 154-512-1796.  Mental Health Association (www.mentalhealth.org): 811.907.1215 or 766-664-7393.       Follow up as directed, for your appointments, per your After Visit Summary Form.

## 2025-03-24 ENCOUNTER — MYC MEDICAL ADVICE (OUTPATIENT)
Dept: PSYCHIATRY | Facility: CLINIC | Age: 30
End: 2025-03-24
Payer: COMMERCIAL

## 2025-03-24 DIAGNOSIS — F90.0 ATTENTION DEFICIT HYPERACTIVITY DISORDER (ADHD), PREDOMINANTLY INATTENTIVE TYPE: ICD-10-CM

## 2025-03-24 RX ORDER — DEXTROAMPHETAMINE SACCHARATE, AMPHETAMINE ASPARTATE MONOHYDRATE, DEXTROAMPHETAMINE SULFATE AND AMPHETAMINE SULFATE 5; 5; 5; 5 MG/1; MG/1; MG/1; MG/1
20 CAPSULE, EXTENDED RELEASE ORAL DAILY
Qty: 30 CAPSULE | Refills: 0 | Status: SHIPPED | OUTPATIENT
Start: 2025-03-24

## 2025-03-24 NOTE — TELEPHONE ENCOUNTER
Date of Last Office Visit: 2/19/25  Date of Next Office Visit:  5/19/25  No shows since last visit: No  More than one patient-initiated cancellation (with reschedule) since last seen in clinic? No    []Medication refilled per  Medication Refill in Ambulatory Care  policy.  []Scope of Practice: refill request processed by LPN/MA  [x]Medication unable to be refilled by RN due to criteria not met as indicated below:    []Eligibility: has not had a provider visit within last 6 months   []Supervision: no future appointment; < 7 days before next appointment   []Compliance: no shows; cancellations; lapse in therapy   []Verification: order discrepancy; may need modification...   [] > 30-day supply request   []Advanced refill request: > 7 days before refill date   [x]Controlled medication   []Medication not included in policy   []Review: new med; med adjusted <= 30 days; safety alert; requires lab monitoring...   []Other:      Medication(s) requested:     -  amphetamine-dextroamphetamine (ADDERALL XR) 20 MG 24 hr capsule   Date last ordered: 2/19/25  Qty: 30  Refills: 0  Appropriate for refill? Provider to review.            Any Controlled Substance(s)? Yes   [place]  checked? N/A      Requested medication(s) verified as identical to current order? Yes    Any lapse in adherence to medication(s) greater than 5 days? No      Additional action taken? routed encounter to provider for review.      Last visit treatment plan:      Last visit 12/02/2024.  Recommendation at last visit.  1.ADHD  : Continue Adderall 20 mg daily  2.  Bipolar-continue lithium 300 mg twice daily  3.  Depression-continue sertraline 50 mg daily-patient is considering weaning off in the next few months  4.  Highly recommend psychotherapy- Is now seeing a therapist - meets bi-weekly   5.  Return to the clinic in approximately 12 weeks calling between visits any questions or concerns  Patient and I reviewed diagnosis and treatment plan and patient agrees with  following recommendations:  Ongoing education given regarding diagnostic and treatment options with adequate verbalization of understanding.  Plan   1.ADHD  : Continue Adderall 20 mg daily  2.  Bipolar-continue lithium 300 mg twice daily  3.  Depression-continue sertraline 50 mg daily-4.  Highly recommend psychotherapy- Is now seeing a therapist - meets bi-weekly   5.  Return to the clinic in approximately 12 weeks calling between visits any questions or concerns    Any medication(s) require lab monitoring? No    Jefferson Isabel RN on 3/24/2025 at 2:48 PM

## 2025-04-21 ENCOUNTER — E-VISIT (OUTPATIENT)
Dept: FAMILY MEDICINE | Facility: CLINIC | Age: 30
End: 2025-04-21
Payer: COMMERCIAL

## 2025-04-21 ENCOUNTER — MYC MEDICAL ADVICE (OUTPATIENT)
Dept: PSYCHIATRY | Facility: CLINIC | Age: 30
End: 2025-04-21

## 2025-04-21 DIAGNOSIS — R19.7 DIARRHEA, UNSPECIFIED TYPE: Primary | ICD-10-CM

## 2025-04-21 DIAGNOSIS — F90.0 ATTENTION DEFICIT HYPERACTIVITY DISORDER (ADHD), PREDOMINANTLY INATTENTIVE TYPE: ICD-10-CM

## 2025-04-21 RX ORDER — DEXTROAMPHETAMINE SACCHARATE, AMPHETAMINE ASPARTATE MONOHYDRATE, DEXTROAMPHETAMINE SULFATE AND AMPHETAMINE SULFATE 5; 5; 5; 5 MG/1; MG/1; MG/1; MG/1
20 CAPSULE, EXTENDED RELEASE ORAL DAILY
Qty: 30 CAPSULE | Refills: 0 | Status: SHIPPED | OUTPATIENT
Start: 2025-04-21

## 2025-04-21 NOTE — TELEPHONE ENCOUNTER
Patient in need of stimulant refill. Sent [employer] message regarding the possibility of changing to generic Vyvanse vs waiting and discussing at next visit. RN acknowledged message via [employer] and ask more about the reason for the request to change.     Date of Last Office Visit: 2/19/25  Date of Next Office Visit:  5/19/2025  No shows since last visit: No  More than one patient-initiated cancellation (with reschedule) since last seen in clinic? No    [x]Medication unable to be refilled by RN due to criteria not met as indicated below:      [x]Controlled medication and potential med change request.       Medication(s) requested:     amphetamine-dextroamphetamine (ADDERALL XR) 20 MG 24 hr capsule 30 capsule 0 3/24/2025 -- No   Sig - Route: Take 1 capsule (20 mg) by mouth daily. - Oral       Any Controlled Substance(s)? Yes     Requested medication(s) verified as identical to current order? Yes    Any lapse in adherence to medication(s) greater than 5 days? No      Last visit treatment plan:        Discussion of Care and Treatment Recommendations:   This is a 29 year old male with apast psychiatric history significant of bipolar 1, depression and ADHD .Pt presets to the clinic for a follow up appointment         Last visit 12/02/2024.  Recommendation at last visit.  1.ADHD  : Continue Adderall 20 mg daily  2.  Bipolar-continue lithium 300 mg twice daily  3.  Depression-continue sertraline 50 mg daily-patient is considering weaning off in the next few months  4.  Highly recommend psychotherapy- Is now seeing a therapist - meets bi-weekly   5.  Return to the clinic in approximately 12 weeks calling between visits any questions or concerns  Patient and I reviewed diagnosis and treatment plan and patient agrees with following recommendations:  Ongoing education given regarding diagnostic and treatment options with adequate verbalization of understanding.  Plan   1.ADHD  : Continue Adderall 20 mg daily  2.  Bipolar-continue lithium  300 mg twice daily  3.  Depression-continue sertraline 50 mg daily-4.  Highly recommend psychotherapy- Is now seeing a therapist - meets bi-weekly   5.  Return to the clinic in approximately 12 weeks calling between visits any questions or concerns      Snehal Mendez RN on 4/21/2025 at 1:19 PM

## 2025-04-22 ENCOUNTER — LAB (OUTPATIENT)
Dept: LAB | Facility: CLINIC | Age: 30
End: 2025-04-22
Payer: COMMERCIAL

## 2025-04-22 DIAGNOSIS — R19.7 DIARRHEA, UNSPECIFIED TYPE: ICD-10-CM

## 2025-04-22 PROCEDURE — 87338 HPYLORI STOOL AG IA: CPT

## 2025-04-24 LAB — H PYLORI AG STL QL IA: NEGATIVE

## 2025-05-05 ENCOUNTER — OFFICE VISIT (OUTPATIENT)
Dept: URGENT CARE | Facility: URGENT CARE | Age: 30
End: 2025-05-05
Payer: COMMERCIAL

## 2025-05-05 VITALS
RESPIRATION RATE: 20 BRPM | HEART RATE: 78 BPM | SYSTOLIC BLOOD PRESSURE: 126 MMHG | DIASTOLIC BLOOD PRESSURE: 73 MMHG | TEMPERATURE: 98 F | OXYGEN SATURATION: 98 %

## 2025-05-05 DIAGNOSIS — B37.0 THRUSH: Primary | ICD-10-CM

## 2025-05-05 PROCEDURE — 3074F SYST BP LT 130 MM HG: CPT | Performed by: STUDENT IN AN ORGANIZED HEALTH CARE EDUCATION/TRAINING PROGRAM

## 2025-05-05 PROCEDURE — 3078F DIAST BP <80 MM HG: CPT | Performed by: STUDENT IN AN ORGANIZED HEALTH CARE EDUCATION/TRAINING PROGRAM

## 2025-05-05 PROCEDURE — 99213 OFFICE O/P EST LOW 20 MIN: CPT | Performed by: STUDENT IN AN ORGANIZED HEALTH CARE EDUCATION/TRAINING PROGRAM

## 2025-05-05 RX ORDER — CLOTRIMAZOLE 10 MG/1
10 LOZENGE ORAL
Qty: 70 LOZENGE | Refills: 0 | Status: SHIPPED | OUTPATIENT
Start: 2025-05-05 | End: 2025-05-19

## 2025-05-05 NOTE — PROGRESS NOTES
ASSESSMENT & PLAN:   Diagnoses and all orders for this visit:  Thrush  -     clotrimazole (MYCELEX) 10 MG lozenge; Place 1 lozenge (10 mg) inside cheek 5 times daily for 14 days.      There are no Patient Instructions on file for this visit.    No follow-ups on file.    At the end of the encounter, I discussed results, diagnosis, medications. Discussed red flags for immediate return to clinic/ER, as well as indications for follow up if no improvement. Patient and/or caregiver understood and agreed to plan. Patient was stable for discharge.    ------------------------------------------------------------------------  SUBJECTIVE  History was obtained from patient.    Patient presents with:  Urgent Care: Rash around mouth few weeks     HPI  Lux Bailey is a(n) 29 year old male presenting to urgent care for dry mouth. Has at baseline due to medications, but significantly worse x 2 weeks. Also reports more malodorous breath and needed to brush teeth more often. No mouth pain.    Current Outpatient Medications   Medication Sig Dispense Refill    amphetamine-dextroamphetamine (ADDERALL XR) 20 MG 24 hr capsule Take 1 capsule (20 mg) by mouth daily. 30 capsule 0    clotrimazole (MYCELEX) 10 MG lozenge Place 1 lozenge (10 mg) inside cheek 5 times daily for 14 days. 70 lozenge 0    COMPOUNDED NON-CONTROLLED SUBSTANCE (CMPD RX) - PHARMACY TO MIX COMPOUNDED MEDICATION Semaglutide 1.5mg subcutaneously every 7 days 2 mL 1    lithium ER (LITHOBID) 300 MG CR tablet Take 2 tablets (600 mg) by mouth daily. 180 tablet 0    sertraline (ZOLOFT) 50 MG tablet Take 1 tablet (50 mg) by mouth daily. 90 tablet 0         OBJECTIVE  Vitals:    05/05/25 1039   BP: 126/73   Pulse: 78   Resp: 20   Temp: 98  F (36.7  C)   SpO2: 98%     Physical Exam   GENERAL: healthy, alert, no acute distress.   PSYCH: mentation appears normal. Normal affect  HEAD: normocephalic, atraumatic.  EYE: PERRL. EOMs intact. No scleral injection bilaterally.   NOSE:  external nose atraumatic without lesions.  OROPHARYNX: moist mucous membranes. Posterior oropharynx without erythema or exudate. Uvula midline. Patent airway. White patches on bilateral inner cheeks and tongue.  LUNGS: no increased work of breathing.   SKIN: lips and skin around mouth without lesions or erythema.    No results found for any visits on 05/05/25.

## 2025-05-09 ENCOUNTER — MEDICAL CORRESPONDENCE (OUTPATIENT)
Dept: HEALTH INFORMATION MANAGEMENT | Facility: CLINIC | Age: 30
End: 2025-05-09
Payer: COMMERCIAL

## 2025-05-13 ENCOUNTER — CARE COORDINATION (OUTPATIENT)
Dept: SLEEP MEDICINE | Facility: CLINIC | Age: 30
End: 2025-05-13
Payer: COMMERCIAL

## 2025-05-13 NOTE — PROGRESS NOTES
Received letter from [facility]. A copy has been given to the provider original copy sent to scanning.         HELEN Carroll

## 2025-05-19 ENCOUNTER — VIRTUAL VISIT (OUTPATIENT)
Dept: PSYCHIATRY | Facility: CLINIC | Age: 30
End: 2025-05-19
Payer: COMMERCIAL

## 2025-05-19 VITALS — BODY MASS INDEX: 25.93 KG/M2 | WEIGHT: 191.2 LBS

## 2025-05-19 DIAGNOSIS — F31.70 BIPOLAR I DISORDER IN REMISSION: ICD-10-CM

## 2025-05-19 DIAGNOSIS — F41.9 ANXIETY: ICD-10-CM

## 2025-05-19 DIAGNOSIS — F90.2 ATTENTION DEFICIT HYPERACTIVITY DISORDER (ADHD), COMBINED TYPE: Primary | ICD-10-CM

## 2025-05-19 PROCEDURE — 98006 SYNCH AUDIO-VIDEO EST MOD 30: CPT | Performed by: NURSE PRACTITIONER

## 2025-05-19 PROCEDURE — G2211 COMPLEX E/M VISIT ADD ON: HCPCS | Performed by: NURSE PRACTITIONER

## 2025-05-19 PROCEDURE — 1126F AMNT PAIN NOTED NONE PRSNT: CPT | Performed by: NURSE PRACTITIONER

## 2025-05-19 RX ORDER — LITHIUM CARBONATE 300 MG/1
600 TABLET, FILM COATED, EXTENDED RELEASE ORAL DAILY
Qty: 180 TABLET | Refills: 0 | Status: SHIPPED | OUTPATIENT
Start: 2025-05-19

## 2025-05-19 RX ORDER — LISDEXAMFETAMINE DIMESYLATE 20 MG/1
20 CAPSULE ORAL EVERY MORNING
Qty: 30 CAPSULE | Refills: 0 | Status: SHIPPED | OUTPATIENT
Start: 2025-05-19

## 2025-05-19 ASSESSMENT — ANXIETY QUESTIONNAIRES
5. BEING SO RESTLESS THAT IT IS HARD TO SIT STILL: NOT AT ALL
GAD7 TOTAL SCORE: 16
7. FEELING AFRAID AS IF SOMETHING AWFUL MIGHT HAPPEN: NEARLY EVERY DAY
GAD7 TOTAL SCORE: 16
IF YOU CHECKED OFF ANY PROBLEMS ON THIS QUESTIONNAIRE, HOW DIFFICULT HAVE THESE PROBLEMS MADE IT FOR YOU TO DO YOUR WORK, TAKE CARE OF THINGS AT HOME, OR GET ALONG WITH OTHER PEOPLE: VERY DIFFICULT
4. TROUBLE RELAXING: NEARLY EVERY DAY
6. BECOMING EASILY ANNOYED OR IRRITABLE: NEARLY EVERY DAY
1. FEELING NERVOUS, ANXIOUS, OR ON EDGE: NEARLY EVERY DAY
8. IF YOU CHECKED OFF ANY PROBLEMS, HOW DIFFICULT HAVE THESE MADE IT FOR YOU TO DO YOUR WORK, TAKE CARE OF THINGS AT HOME, OR GET ALONG WITH OTHER PEOPLE?: VERY DIFFICULT
2. NOT BEING ABLE TO STOP OR CONTROL WORRYING: NEARLY EVERY DAY
3. WORRYING TOO MUCH ABOUT DIFFERENT THINGS: SEVERAL DAYS
GAD7 TOTAL SCORE: 16
7. FEELING AFRAID AS IF SOMETHING AWFUL MIGHT HAPPEN: NEARLY EVERY DAY

## 2025-05-19 ASSESSMENT — PATIENT HEALTH QUESTIONNAIRE - PHQ9
SUM OF ALL RESPONSES TO PHQ QUESTIONS 1-9: 7
10. IF YOU CHECKED OFF ANY PROBLEMS, HOW DIFFICULT HAVE THESE PROBLEMS MADE IT FOR YOU TO DO YOUR WORK, TAKE CARE OF THINGS AT HOME, OR GET ALONG WITH OTHER PEOPLE: SOMEWHAT DIFFICULT
SUM OF ALL RESPONSES TO PHQ QUESTIONS 1-9: 7

## 2025-05-19 ASSESSMENT — PAIN SCALES - GENERAL: PAINLEVEL_OUTOF10: NO PAIN (0)

## 2025-05-19 NOTE — NURSING NOTE
Is the patient currently in the state of [place]? YES    Current patient location: Patient declined to provide     Visit mode:Video    If the visit is dropped, the patient can be reconnected by: VIDEO VISIT: Text to cell phone:   Telephone Information:   Mobile [phone]       Will anyone else be joining the visit? No  (If patient encounters technical issues they should call [phone])    Are changes needed to the allergy or medication list? Yes NO longer taking compounded semiglutide    Are refills needed on medications prescribed by this physician? Discuss with Provider    Rooming Documentation: Questionnaire(s) completed.    Reason for visit: RECHKYREE Wilder

## 2025-05-19 NOTE — PATIENT INSTRUCTIONS
"The Panel Psychiatry Program  What to Expect  Here's what to expect in the Panel Psychiatry Program.   About the program  You'll be meeting with a psychiatric doctor to check your mental health. A psychiatric doctor helps you deal with troubling thoughts and feelings by giving you medicine. They'll make sure you know the plan for your care. You may see them for a long time. When you're feeling better, they may refer you back to seeing your family doctor.   If you have any questions, we'll be glad to talk to you.  About visits  Be open  At your visits, please talk openly about your problems. It may feel hard, but it's the best way for us to help you.  Cancelling visits  If you can't come to your visit, please call us right away at [phone]. If you don't cancel at least 24 hours (1 full day) before your visit, that's \"late cancellation.\"  Not showing up for your visits  Being very late is the same as not showing up. You'll be a \"no show\" if:  You're more than 15 minutes late for a 30-minute (half hour) visit.  You're more than 30 minutes late for a 60-minute (full hour) visit.  If you cancel late or don't show up 2 times within 6 months, we may end your care.  Getting help between visits  If you need help between visits, you can call us Monday to Friday from 8 a.m. to 4:30 p.m. at [phone].  Emergency care  Call 911 or go to the nearest emergency department if your life or someone else's life is in danger.  Call 988 anytime to reach the national Suicide and Crisis hotline.  Medicine refills  To refill your medicine, call your pharmacy. You can also call [facility]'s Behavioral Access at [phone], Monday to Friday, 8 a.m. to 4:30 p.m. It can take 1 to 3 business days to get a refill.   Forms, letters, and tests  You may have papers to fill out, like FMLA, short-term disability, and workability. We can help you with these forms at your visits, but you must have an appointment. You may need more " than 1 visit for this, to be in an intensive therapy program, or both.  Before we can give you medicine for ADHD, we may refer you to get tested for it or confirm it another way.  We may not be able to give you an emotional support animal letter.  We don't do mental health checks ordered by the court.   We don't do mental health testing, but we can refer you to get tested.   Thank you for choosing us for your care.  For informational purposes only. Not to replace the advice of your health care provider. Copyright   2022 [facility]. All rights reserved. [employer] [id number] - 12/22      After Visit Summary   Continue medications as prescribed  Have your pharmacy contact us for a refill if you are running low on medications (We may ask you to come into clinic to get a refill from the nurse  No Alcohol or drug use  No driving if sedated  Call the clinic with any questions or concerns   Reach out for help if you feel like hurting yourself or others ([facility] [phone]: [address] [facility], [address] or [facility] Suicide Hotline   [phone] , call 911 or go to nearest Emergency room     Crisis Resources:    Present to the Emergency Department as needed or call after hours crisis line at [phone] or [phone].   [place] Crisis Text Line: Text [place] to 730250.  Suicide LifeLine Chat: suicidepreventionlifeline.org/chat/.  National Suicide Prevention Lifeline: 373.139.6305 (TTY: 889.397.2126). Call anytime for help.  (www.suicidepreventionlifeline.org)  National [place] on Mental Illness (www.ileana.org): 273.357.5845 or 333-813-9504.  Mental Health Association (www.mentalhealth.org): 469.579.6904 or 086-985-3138.       Follow up as directed, for your appointments, per your After Visit Summary Form.

## 2025-05-19 NOTE — PROGRESS NOTES
"  The patient has been notified of following:      \"This virtual  visit will be conducted via a call between you and your physician/provider. We have found that certain health care needs can be provided without the need for a physical exam.  This service lets us provide the care you need virtually/via video   If a prescription is necessary we can send it directly to your pharmacy.  If lab work is needed we can place an order for that and you can then stop by our lab to have the test done at a later time.     Virtual/Video visits are billed at different rates depending on your insurance coverage.Some insurers they may be billed the same as an in-person visit.  Please reach out to your insurance provider with any questions.    Patient has given verbal consent for virtual  visit : Yes      Ho w would you like to obtain your AVS? Mail a copy  If the video visit is dropped, the invitation should be resent by: Send to e-mail at: [email or web address]  Will anyone else be joining your video visit? No            Psychiatric  Out- Patient  Follow Up Progress Note  Date of visit:5/19/2025           Discussion of Care and Treatment Recommendations:   This is a 29 year old male with apast psychiatric history significant of bipolar 1, depression and ADHD .Pt presets to the clinic for a follow up appointment .      Last visit 02/19/2025.  Recommendation at last visit .  1.ADHD  : Continue Adderall 20 mg daily  2.  Bipolar-continue lithium 300 mg twice daily  3.  Depression-continue sertraline 50 mg daily-4.  Highly recommend psychotherapy- Is now seeing a therapist - meets bi-weekly   5.  Return to the clinic in approximately 12 weeks calling between visits any questions or concerns     Patient and I reviewed diagnosis and treatment plan and patient agrees with following recommendations:  Ongoing education given regarding diagnostic and treatment options with adequate verbalization of understanding.  Plan   1.ADHD  : Stop  " Adderall 20 mg daily, start Vyvanse 20 mg per pt request   2.  Bipolar-continue lithium 300 mg twice daily  3.  Depression-continue sertraline 50 mg daily-4.  Highly recommend psychotherapy- Is now seeing a therapist - meets bi-weekly   5.  Return to the clinic in approximately 12 weeks calling between visits any questions or concerns         DIagnoses:       Bipolar I disorder (H)  Eating disorder, unspecified type (history)  Attention deficit hyperactivity disorder (ADHD), predominantly inattentive type  Long term use of drug       Patient Active Problem List   Diagnosis    Bipolar I disorder (H)    Class 1 obesity due to excess calories without serious comorbidity with body mass index (BMI) of 31.0 to 31.9 in adult    Eating disorder, unspecified type    ADHD (attention deficit hyperactivity disorder), inattentive type             Chief Complaint / Subjective:    Chief complaint: Bipolar disorder        History of Present Illness: Per patient statement-he reports compliance with current medications denies any side effects.  He started walking with a therapist but has not seen therapist for about a month but he will call to make an appointment.  He is requesting to switch from Adderall to Vyvanse.  States he was initially on Vyvanse but it is very expensive and he switched to Adderall.  He states that there is now a generic brand Vyvanse which is cheaper and more available than Adderall.  I did make the switch at his request.  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  He continues to work full-time.  Experiences occasional residual anxiety symptoms but states he is able to manage all current medication and is also interested in medication changes.    Answers submitted by the patient for this visit:  Patient Health Questionnaire (Submitted on 5/19/2025)  If you checked off any problems, how difficult have these problems made it for  "you to do your work, take care of things at home, or get along with other people?: Somewhat difficult  PHQ9 TOTAL SCORE: 7  Patient Health Questionnaire (G7) (Submitted on 5/19/2025)  AMAURY 7 TOTAL SCORE: 16    Mental Status Examination:   Appearance: Well groomed, good eye contact   Orientation: Patient alert and oriented to person, place, time, and situation  Reliability:  Patient appears to be an adequate historian.    Behavior: cooperative   Speech: Speech is spontaneous and coherent, with a normal rate, rhythm and tone.    Language:There are no difficulties with expressive or receptive language as observed throughout the interview.    Mood: Described as \"ok\".    Affect: congruent   Judgement: Able to make basic decision regarding safety.  Insight: Good awareness of physical and mental health conditions and aware of needs around care for these.  Gait and station: unable to assess  Thought process: Logical   Thought content: No evidence of delusions or paranoia.    Hallucinations : No evidence of any hallucination  Thought content: No evidence of delusions or paranoia.   Suicidal /Homical Ideations:  No thoughts of self harm or suicide. No thoughts of harming others.  Associations: Connected  Fund of knowledge: Average  Attention / Concentration: Able to remain focused during the interview with minimal distractibility or need for redirection.  Short Term Memory: Grossly intact as evidence by client recalling themes and ideas discussed.  Long Term Memory: Intact  Motor Status: unable to asse    Drug/treatment history and current pattern of use:   Seizures/DTs/hallucinations: Denies   Past Use: Numerous drugs in college, former cannabis use. LSD, ketamine, cannabis, DMT, psylocybin, research chemicals. No opioids or benzodiazepines   Legal Consequences- None  No current use of substance use, denies any current use.   Alcohol: None  Nicotine: None  Caffeine: 600-800mg caffeine a day   Opioids: None                 " Narcan Kit: N/A  THC/CBD: None     Medication changes: See Above   Medication adherence: compliant  Medication side effects: absent  Information about medications: Side effects, benefits and alternative treatments discussed and patient agrees .    Psychotherapy: Supportive therapy day-to-day living    Education: Diet, exercise, abstinence from drugs and alcohol, patient will not drive if sedated and medications or  under influence of any substance    Lab Results:   Personally reviewed and discussed with the patient    Lab Results   Component Value Date    WBC 7.6 09/06/2024    HGB 17.1 09/06/2024    HCT 51.3 09/06/2024     09/06/2024    CHOL 213 (H) 12/11/2023    TRIG 182 (H) 12/11/2023    HDL 33 (L) 12/11/2023    ALT 27 09/06/2024    AST 21 09/06/2024     09/06/2024    BUN 15.9 09/06/2024    CO2 26 09/06/2024    TSH 1.27 12/11/2023       Vital signs:  Wt 86.7 kg (191 lb 3.2 oz)   BMI 25.93 kg/m    Telemedicine visit-no vital signs completed  Allergies: Patient has no known allergies.         Medications:     Current Outpatient Medications   Medication Sig Dispense Refill    amphetamine-dextroamphetamine (ADDERALL XR) 20 MG 24 hr capsule Take 1 capsule (20 mg) by mouth daily. 30 capsule 0    clotrimazole (MYCELEX) 10 MG lozenge Place 1 lozenge (10 mg) inside cheek 5 times daily for 14 days. 70 lozenge 0    lithium ER (LITHOBID) 300 MG CR tablet Take 2 tablets (600 mg) by mouth daily. 180 tablet 0    sertraline (ZOLOFT) 50 MG tablet Take 1 tablet (50 mg) by mouth daily. 90 tablet 0    COMPOUNDED NON-CONTROLLED SUBSTANCE (CMPD RX) - PHARMACY TO MIX COMPOUNDED MEDICATION Semaglutide 1.5mg subcutaneously every 7 days (Patient not taking: Reported on 5/19/2025) 2 mL 1     No current facility-administered medications for this visit.         Medication adherence: Reviewed risk/benefits of medication , Patient able to verbalize understanding of side effects and Patient verbally consents to taking  medications    Suicide Risk Assessment:   Feels safe in home: Yes   Suicidal ideation: Denies  History of suicide attempts:  No   Hx of impulsivity: No Impetuous and self-damaging behavior is common and can take many forms. Patients abuse substances, binge eat, engage in unsafe sex, spend money irresponsibly, and drive recklessly. In addition, patients can suddenly quit a job that they need or end a relationship that has the potential to last, thereby sabotaging their own success. Impulsivity can also manifest with immature and regressive behavior and often takes the form of sexually acting out.  Hope for the future: present   Hx of Command hallucinations or current psychosis: No  History of Self-injurious behaviors: No Current:  No  Family member  by suicide:  No  A thorough assessment of risk factors related to suicide and self-harm have been reviewed and are noted above. The patient convincingly denies acute suicidality on several occasions. Local community safety resources reviewed and printed for patient to use if needed. There was no deceit detected, and the patient presented in a manner that was believab          PSYCHOEDUCATION:  Medication side effects and alternatives reviewed. Health promotion activities recommended and reviewed today. All questions addressed. Education and counseling completed regarding risks and benefits of medications and psychotherapy options.  Consent provided by patient/guardian  Call the psychiatric nurse line with medication questions or concerns at [phone].  MyChart may be used to communicate with your provider, but this is not intended to be used for emergencies.  SEROTONIN SYNDROME:  Discussed risks of Serotonin syndrome (ie, serotonin toxicity) which is a potentially life-threatening condition associated with increased serotonergic activity in the central nervous system (CNS). It is seen with therapeutic medication use, inadvertent interactions between drugs, and  intentional self-poisoning. Serotonin syndrome may involve a spectrum of clinical findings, which often include mental status changes, autonomic hyperactivity, and neuromuscular abnormalities.    STIMULANT THERAPY: Side effects discussed including but not limited to cardiac (including HTN, tachycardia, sudden death), motor/tic, appetite/growth, mood lability and sleep disruption. This is a controlled substance with risk for abuse, need to keep in a safe keep place and cannot replace lost scripts  HARM REDUCTION:  Discussions regarding effects of mood altering substances, alcohol and cannabis, on mood and that approach is harm reduction, will continue to prescribe meds as they work to cut back use.    SAFETY:  We all care about your loved one's safety. To reduce the risk of self-harm, remove access to all:  Firearms, Medicines (both prescribed and over-the-counter), Knives and other sharp objects, Ropes and like materials, and Alcohol  SLEEP HYGIENE: establish a sleep routine, limit screen time 1 hour prior to bed, use bed for sleep only, take sleep/medications on time (including sleepy time tea, trazadone or herbal treatments such as melatonin), aroma therapy, limit caffeine/sugar, yoga, guided imagery, stretch, meditation, limit naps to 20 minutes, make a temperature change in the room, white noise, be mindful of slowing down breathing, take a warm bath/shower, frequently wash sheets, and journaling.   Medlineplus.gov is information for patients.  It is run by the National Library of Medicine and it contains information about all disorders, diseases and all medications.              Review of Systems:      ROS:    Subjective Data Only- Tele-Health Visit    10 point ROS was negative except for the items listed in HPI.      Crisis Resources:    Present to the Emergency Department as needed or call after hours crisis line at [phone] or [phone].   [place] Crisis Text Line: Text [place] to 262880.  Suicide  LifeLine Chat: suicidepreventionlifeline.org/chat/.  National Suicide Prevention Lifeline: 803.589.6568 (TTY: 163.329.6312). Call anytime for help.  (www.suicidepreventionlifeline.org)  National [place] on Mental Illness (www.ileana.org): 198.657.6631 or 902-025-3132.  Mental Health Association (www.mentalhealth.org): 878.753.6804 or 326-895-2947.      Coordination of Care:   More than 50% of time spent on coordination of care including: Educating patient about diagnosis, prognosis, side effects and benefits of medications, diet, exercise.  Time also spent providing supportive therapy regarding above issues.    Video-Visit Details    Type of service:  Video Visit    Originating Location (pt. Location): Home    Distant Location (provider location): Providers Remote Office     Platform used for Video Visit: Ellen      Disclaimer: This note consists of symbols derived from keyboarding, dictation and/or voice recognition software. As a result, there may be errors in the script that have gone undetected. Please consider this when interpreting information found in this chart.    Start Time : 0900  End time : 0930

## 2025-06-10 ENCOUNTER — RESULTS FOLLOW-UP (OUTPATIENT)
Dept: FAMILY MEDICINE | Facility: CLINIC | Age: 30
End: 2025-06-10

## 2025-06-11 ENCOUNTER — VIRTUAL VISIT (OUTPATIENT)
Dept: PSYCHIATRY | Facility: CLINIC | Age: 30
End: 2025-06-11
Payer: COMMERCIAL

## 2025-06-11 VITALS — BODY MASS INDEX: 26.41 KG/M2 | HEIGHT: 72 IN | WEIGHT: 195 LBS

## 2025-06-11 DIAGNOSIS — F90.2 ATTENTION DEFICIT HYPERACTIVITY DISORDER (ADHD), COMBINED TYPE: ICD-10-CM

## 2025-06-11 DIAGNOSIS — F31.70 BIPOLAR I DISORDER IN REMISSION: ICD-10-CM

## 2025-06-11 RX ORDER — LISDEXAMFETAMINE DIMESYLATE 20 MG/1
20 CAPSULE ORAL EVERY MORNING
Qty: 30 CAPSULE | Refills: 0 | Status: SHIPPED | OUTPATIENT
Start: 2025-06-11 | End: 2025-06-11

## 2025-06-11 RX ORDER — LISDEXAMFETAMINE DIMESYLATE 30 MG/1
30 CAPSULE ORAL EVERY MORNING
Qty: 30 CAPSULE | Refills: 0 | Status: SHIPPED | OUTPATIENT
Start: 2025-06-11 | End: 2025-06-11

## 2025-06-11 RX ORDER — LISDEXAMFETAMINE DIMESYLATE 30 MG/1
30 CAPSULE ORAL EVERY MORNING
Qty: 30 CAPSULE | Refills: 0 | Status: SHIPPED | OUTPATIENT
Start: 2025-06-11

## 2025-06-11 ASSESSMENT — PAIN SCALES - GENERAL: PAINLEVEL_OUTOF10: NO PAIN (0)

## 2025-06-11 NOTE — NURSING NOTE
Current patient location: [facility] [address] [facility] [address]    Is the patient currently in the state of [place]? YES    Visit mode: VIDEO    If the visit is dropped, the patient can be reconnected by:VIDEO VISIT: Text to cell phone:   Telephone Information:   Mobile [phone]       Will anyone else be joining the visit? NO  (If patient encounters technical issues they should call [phone])    Are changes needed to the allergy or medication list? No    Are refills needed on medications prescribed by this physician? Discuss with provider    Rooming Documentation:  Questionnaire(s) completed Attendance guidelines (MH&A only)    Reason for visit: RECHECK    Mattie ADORNO

## 2025-06-12 NOTE — PROGRESS NOTES
Was late to join pt visit after a new appointment prior - attempted to call p x2 t but no answer- refilled prescription -noted that  pt called later and was rescheduled   Was able to speak with pt after a 3rd trial  he has requested an increase in vyanse- Vyanse titrated to 30 mg sent to [facility] pharmacy per pt's request . Pt will  keep appt on 6/30

## 2025-06-12 NOTE — PATIENT INSTRUCTIONS
"The Panel [facility]  What to Expect  Here's what to expect in the Panel Psychiatry Program.   About the program  You'll be meeting with a psychiatric doctor to check your mental health. A psychiatric doctor helps you deal with troubling thoughts and feelings by giving you medicine. They'll make sure you know the plan for your care. You may see them for a long time. When you're feeling better, they may refer you back to seeing your family doctor.   If you have any questions, we'll be glad to talk to you.  About visits  Be open  At your visits, please talk openly about your problems. It may feel hard, but it's the best way for us to help you.  Cancelling visits  If you can't come to your visit, please call us right away at [phone]. If you don't cancel at least 24 hours (1 full day) before your visit, that's \"late cancellation.\"  Not showing up for your visits  Being very late is the same as not showing up. You'll be a \"no show\" if:  You're more than 15 minutes late for a 30-minute (half hour) visit.  You're more than 30 minutes late for a 60-minute (full hour) visit.  If you cancel late or don't show up 2 times within 6 months, we may end your care.  Getting help between visits  If you need help between visits, you can call us Monday to Friday from 8 a.m. to 4:30 p.m. at [phone].  Emergency care  Call 911 or go to the nearest emergency department if your life or someone else's life is in danger.  Call 988 anytime to reach the national Suicide and Crisis hotline.  Medicine refills  To refill your medicine, call your pharmacy. You can also call [facility]'s [facility] at [phone], Monday to Friday, 8 a.m. to 4:30 p.m. It can take 1 to 3 business days to get a refill.   Forms, letters, and tests  You may have papers to fill out, like FMLA, short-term disability, and workability. We can help you with these forms at your visits, but you must have an appointment. You may need more " than 1 visit for this, to be in an intensive therapy program, or both.  Before we can give you medicine for ADHD, we may refer you to get tested for it or confirm it another way.  We may not be able to give you an emotional support animal letter.  We don't do mental health checks ordered by the court.   We don't do mental health testing, but we can refer you to get tested.   Thank you for choosing us for your care.  For informational purposes only. Not to replace the advice of your health care provider. Copyright   2022 [facility]. All rights reserved. [employer] [id number] - 12/22      After Visit Summary   Continue medications as prescribed  Have your pharmacy contact us for a refill if you are running low on medications (We may ask you to come into clinic to get a refill from the nurse  No Alcohol or drug use  No driving if sedated  Call the clinic with any questions or concerns   Reach out for help if you feel like hurting yourself or others ([facility] [phone]: [address] [facility], [address] or [facility] Suicide Hotline   [phone] , call 911 or go to nearest Emergency room     Crisis Resources:    Present to the Emergency Department as needed or call after hours crisis line at [phone] or [phone].   [place] Crisis Text Line: Text [place] to 047259.  Suicide LifeLine Chat: suicidepreventionlifeline.org/chat/.  National Suicide Prevention Lifeline: 813.920.5841 (TTY: 801.584.3263). Call anytime for help.  (www.suicidepreventionlifeline.org)  National [place] on Mental Illness (www.ileana.org): 248.665.5155 or 983-668-6069.  Mental Health Association (www.mentalhealth.org): 837.727.8329 or 500-262-8804.       Follow up as directed, for your appointments, per your After Visit Summary Form.

## 2025-07-03 ENCOUNTER — VIRTUAL VISIT (OUTPATIENT)
Dept: PSYCHIATRY | Facility: CLINIC | Age: 30
End: 2025-07-03
Payer: COMMERCIAL

## 2025-07-03 DIAGNOSIS — F90.0 ATTENTION DEFICIT HYPERACTIVITY DISORDER (ADHD), PREDOMINANTLY INATTENTIVE TYPE: ICD-10-CM

## 2025-07-03 DIAGNOSIS — F31.9 BIPOLAR I DISORDER (H): Primary | ICD-10-CM

## 2025-07-03 PROCEDURE — 98005 SYNCH AUDIO-VIDEO EST LOW 20: CPT | Performed by: NURSE PRACTITIONER

## 2025-07-03 ASSESSMENT — PATIENT HEALTH QUESTIONNAIRE - PHQ9
SUM OF ALL RESPONSES TO PHQ QUESTIONS 1-9: 6
10. IF YOU CHECKED OFF ANY PROBLEMS, HOW DIFFICULT HAVE THESE PROBLEMS MADE IT FOR YOU TO DO YOUR WORK, TAKE CARE OF THINGS AT HOME, OR GET ALONG WITH OTHER PEOPLE: NOT DIFFICULT AT ALL
SUM OF ALL RESPONSES TO PHQ QUESTIONS 1-9: 6

## 2025-07-03 NOTE — NURSING NOTE
Current patient location: [facility] [address] [facility] [address]    Is the patient currently in the state of [place]? YES    Visit mode: VIDEO    If the visit is dropped, the patient can be reconnected by:VIDEO VISIT: Text to cell phone:   Telephone Information:   Mobile [phone]       Will anyone else be joining the visit? NO  (If patient encounters technical issues they should call [phone])    Are changes needed to the allergy or medication list? Pt stated no changes to allergies and Pt stated no med changes    Are refills needed on medications prescribed by this physician? Discuss with provider    Rooming Documentation:  Questionnaire(s) completed    Reason for visit: RECHGIA ADORNO

## 2025-07-03 NOTE — PROGRESS NOTES
"  The patient has been notified of following:      \"This virtual  visit will be conducted via a call between you and your physician/provider. We have found that certain health care needs can be provided without the need for a physical exam.  This service lets us provide the care you need virtually/via video   If a prescription is necessary we can send it directly to your pharmacy.  If lab work is needed we can place an order for that and you can then stop by our lab to have the test done at a later time.     Virtual/Video visits are billed at different rates depending on your insurance coverage.Some insurers they may be billed the same as an in-person visit.  Please reach out to your insurance provider with any questions.    Patient has given verbal consent for virtual  visit : Yes      Ho w would you like to obtain your AVS? Mail a copy  If the video visit is dropped, the invitation should be resent by: Send to e-mail at: [email or web address]  Will anyone else be joining your video visit? No  {If patient encounters technical issues they should call [phone] :[id number]}          Psychiatric  Out- Patient  Follow Up Progress Note  Date of visit:7/3/2025           Discussion of Care and Treatment Recommendations:   This is a 29 year old male with  a past psychiatric history significant of bipolar 1, depression and ADHD .Pt presets to the clinic for a follow up appointment .   .      Last visit  05/19/2025 .  Recommendation at last visit .  .ADHD  : Stop  Adderall 20 mg daily, start Vyvanse 20 mg per pt request   2.  Bipolar-continue lithium 300 mg twice daily  3.  Depression-continue sertraline 50 mg daily-4.  Highly recommend psychotherapy- Is now seeing a therapist - meets bi-weekly   5.  Return to the clinic in approximately 12 weeks calling between visits any questions or concerns  Patient and I reviewed diagnosis and treatment plan and patient agrees with following recommendations:  Ongoing education given " regarding diagnostic and treatment options with adequate verbalization of understanding.  Plan   .ADHD  : Continue  Vyvanse 30 mg per pt request - increased on 6/11/25 after phone call with pt   2.  Bipolar-continue lithium 300 mg twice daily  3.  Depression-continue sertraline 50 mg daily-4.  Highly recommend psychotherapy- Is now seeing a therapist - meets bi-weekly   5.  Return to the clinic in approximately 12 weeks calling between visits any questions or concerns         DIagnoses:      Bipolar I disorder (H)  Eating disorder, unspecified type (history)  Attention deficit hyperactivity disorder (ADHD), predominantly inattentive type  Long term use of drug     Patient Active Problem List   Diagnosis    Bipolar I disorder (H)    Class 1 obesity due to excess calories without serious comorbidity with body mass index (BMI) of 31.0 to 31.9 in adult    Eating disorder, unspecified type    ADHD (attention deficit hyperactivity disorder), inattentive type             Chief Complaint / Subjective:    Chief complaint: Bipolar disorder     History of Present Illness:   Per patient statement-he is able to focus better on 30 mg of Vyvanse.  Other psychiatric symptoms are well-managed on current medications.  He is looking forward to Fourth of July weekend-he [place] the weekend [place]   No new concerns today.  Patient to continue current medications   reviewed no concerns.  Answers submitted by the patient for this visit:  Patient Health Questionnaire (Submitted on 7/3/2025)  If you checked off any problems, how difficult have these problems made it for you to do your work, take care of things at home, or get along with other people?: Not difficult at all  PHQ9 TOTAL SCORE: 6      Mental Status Examination:   Appearance: Well groomed, good eye contact   Orientation: Patient alert and oriented to person, place, time, and situation  Reliability:  Patient appears to be an  "adequate historian.    Behavior: cooperative   Speech: Speech is spontaneous and coherent, with a normal rate, rhythm and tone.    Language:There are no difficulties with expressive or receptive language as observed throughout the interview.    Mood: Described as \"ok\".    Affect: congruent   Judgement: Able to make basic decision regarding safety.  Insight: Good awareness of physical and mental health conditions and aware of needs around care for these.  Gait and station: unable to assess  Thought process: Logical   Thought content: No evidence of delusions or paranoia.    Hallucinations : No evidence of any hallucination  Thought content: No evidence of delusions or paranoia.   Suicidal /Homical Ideations:  No thoughts of self harm or suicide. No thoughts of harming others.  Associations: Connected  Fund of knowledge: Average  Attention / Concentration: Able to remain focused during the interview with minimal distractibility or need for redirection.  Short Term Memory: Grossly intact as evidence by client recalling themes and ideas discussed.  Long Term Memory: Intact  Motor Status: unable to asse    Drug/treatment history and current pattern of use:   Seizures/DTs/hallucinations: Denies   Past Use: Numerous drugs in college, former cannabis use. LSD, ketamine, cannabis, DMT, psylocybin, research chemicals. No opioids or benzodiazepines   Legal Consequences- None  No current use of substance use, denies any current use.   Alcohol: None  Nicotine: None  Caffeine: 600-800mg caffeine a day   Opioids: None                 Narcan Kit: N/A  THC/CBD: None     Medication changes: See Above   Medication adherence: compliant  Medication side effects: absent  Information about medications: Side effects, benefits and alternative treatments discussed and patient agrees .    Psychotherapy: Supportive therapy day-to-day living    Education: Diet, exercise, abstinence from drugs and alcohol, patient will not drive if sedated and " medications or  under influence of any substance    Lab Results:   Personally reviewed and discussed with the patient    Lab Results   Component Value Date    WBC 7.6 09/06/2024    HGB 17.1 09/06/2024    HCT 51.3 09/06/2024     09/06/2024    CHOL 213 (H) 12/11/2023    TRIG 182 (H) 12/11/2023    HDL 33 (L) 12/11/2023    ALT 27 09/06/2024    AST 21 09/06/2024     09/06/2024    BUN 15.9 09/06/2024    CO2 26 09/06/2024    TSH 1.27 12/11/2023       Vital signs:  There were no vitals taken for this visit.  Telemedicine visit-no vital signs completed  Allergies: Patient has no known allergies.         Medications:     Current Outpatient Medications   Medication Sig Dispense Refill    COMPOUNDED NON-CONTROLLED SUBSTANCE (CMPD RX) - PHARMACY TO MIX COMPOUNDED MEDICATION Semaglutide 1.5mg subcutaneously every 7 days (Patient not taking: Reported on 5/19/2025) 2 mL 1    lisdexamfetamine (VYVANSE) 30 MG capsule Take 1 capsule (30 mg) by mouth every morning. 30 capsule 0    lithium ER (LITHOBID) 300 MG CR tablet Take 2 tablets (600 mg) by mouth daily. 180 tablet 0    sertraline (ZOLOFT) 50 MG tablet Take 1 tablet (50 mg) by mouth daily. 90 tablet 0     No current facility-administered medications for this visit.             Medication adherence: Reviewed risk/benefits of medication , Patient able to verbalize understanding of side effects and Patient verbally consents to taking medications    Suicide Risk Assessment:   Feels safe in home: Yes   Suicidal ideation: Denies  History of suicide attempts:  No   Hx of impulsivity: No Impetuous and self-damaging behavior is common and can take many forms. Patients abuse substances, binge eat, engage in unsafe sex, spend money irresponsibly, and drive recklessly. In addition, patients can suddenly quit a job that they need or end a relationship that has the potential to last, thereby sabotaging their own success. Impulsivity can also manifest with immature and regressive  behavior and often takes the form of sexually acting out.  Hope for the future: present   Hx of Command hallucinations or current psychosis: No  History of Self-injurious behaviors: No Current:  No  Family member  by suicide:  No  A thorough assessment of risk factors related to suicide and self-harm have been reviewed and are noted above. The patient convincingly denies acute suicidality on several occasions. Local community safety resources reviewed and printed for patient to use if needed. There was no deceit detected, and the patient presented in a manner that was believab          PSYCHOEDUCATION:  Medication side effects and alternatives reviewed. Health promotion activities recommended and reviewed today. All questions addressed. Education and counseling completed regarding risks and benefits of medications and psychotherapy options.  Consent provided by patient/guardian  Call the psychiatric nurse line with medication questions or concerns at [phone].  Gold Lassohart may be used to communicate with your provider, but this is not intended to be used for emergencies.  SEROTONIN SYNDROME:  Discussed risks of Serotonin syndrome (ie, serotonin toxicity) which is a potentially life-threatening condition associated with increased serotonergic activity in the central nervous system (CNS). It is seen with therapeutic medication use, inadvertent interactions between drugs, and intentional self-poisoning. Serotonin syndrome may involve a spectrum of clinical findings, which often include mental status changes, autonomic hyperactivity, and neuromuscular abnormalities.    STIMULANT THERAPY: Side effects discussed including but not limited to cardiac (including HTN, tachycardia, sudden death), motor/tic, appetite/growth, mood lability and sleep disruption. This is a controlled substance with risk for abuse, need to keep in a safe keep place and cannot replace lost scripts  HARM REDUCTION:  Discussions regarding effects  of mood altering substances, alcohol and cannabis, on mood and that approach is harm reduction, will continue to prescribe meds as they work to cut back use.    SAFETY:  We all care about your loved one's safety. To reduce the risk of self-harm, remove access to all:  Firearms, Medicines (both prescribed and over-the-counter), Knives and other sharp objects, Ropes and like materials, and Alcohol  SLEEP HYGIENE: establish a sleep routine, limit screen time 1 hour prior to bed, use bed for sleep only, take sleep/medications on time (including sleepy time tea, trazadone or herbal treatments such as melatonin), aroma therapy, limit caffeine/sugar, yoga, guided imagery, stretch, meditation, limit naps to 20 minutes, make a temperature change in the room, white noise, be mindful of slowing down breathing, take a warm bath/shower, frequently wash sheets, and journaling.   Medlineplus.gov is information for patients.  It is run by the [employer] and it contains information about all disorders, diseases and all medications.              Review of Systems:      ROS:    Subjective Data Only- Tele-Health Visit    10 point ROS was negative except for the items listed in HPI.      Crisis Resources:    Present to the Emergency Department as needed or call after hours crisis line at [phone] or [phone].   [place] Crisis Text Line: Text [place] to 386613.  Suicide LifeLine Chat: suicidepreventionlifeline.org/chat/.  National Suicide Prevention Lifeline: 779.465.4690 (TTY: 870.698.4661). Call anytime for help.  (www.suicidepreventionlifeline.org)  National [place] on Mental Illness (www.[place]ana.org): 310.161.8415 or 670-262-1273.  Mental Health Association (www.mentalhealth.org): 439.792.6747 or 533-539-6986.      Coordination of Care:   More than 50% of time spent on coordination of care including: Educating patient about diagnosis, prognosis, side effects and benefits of medications, diet, exercise.  Time also  spent providing supportive therapy regarding above issues.    Video-Visit Details    Type of service:  Video Visit    Originating Location (pt. Location): Home    Distant Location (provider location): Providers Remote Office     Platform used for Video Visit: Ellen      Disclaimer: This note consists of symbols derived from keyboarding, dictation and/or voice recognition software. As a result, there may be errors in the script that have gone undetected. Please consider this when interpreting information found in this chart.    Start Time : 1600  End time :

## 2025-07-10 ENCOUNTER — OFFICE VISIT (OUTPATIENT)
Dept: FAMILY MEDICINE | Facility: CLINIC | Age: 30
End: 2025-07-10
Payer: COMMERCIAL

## 2025-07-10 VITALS
WEIGHT: 205 LBS | DIASTOLIC BLOOD PRESSURE: 76 MMHG | RESPIRATION RATE: 16 BRPM | HEART RATE: 99 BPM | SYSTOLIC BLOOD PRESSURE: 132 MMHG | TEMPERATURE: 98.1 F | BODY MASS INDEX: 27.77 KG/M2 | OXYGEN SATURATION: 98 % | HEIGHT: 72 IN

## 2025-07-10 DIAGNOSIS — Z11.4 SCREENING FOR HIV (HUMAN IMMUNODEFICIENCY VIRUS): ICD-10-CM

## 2025-07-10 DIAGNOSIS — B37.0 THRUSH, ORAL: ICD-10-CM

## 2025-07-10 DIAGNOSIS — Z51.81 ENCOUNTER FOR THERAPEUTIC DRUG MONITORING: ICD-10-CM

## 2025-07-10 DIAGNOSIS — Z11.59 NEED FOR HEPATITIS C SCREENING TEST: ICD-10-CM

## 2025-07-10 DIAGNOSIS — Z11.3 SCREENING EXAMINATION FOR STI: ICD-10-CM

## 2025-07-10 DIAGNOSIS — Z00.00 ENCOUNTER FOR ANNUAL PHYSICAL EXAM: Primary | ICD-10-CM

## 2025-07-10 DIAGNOSIS — Z13.6 SCREENING FOR HEART DISEASE: ICD-10-CM

## 2025-07-10 RX ORDER — CLOTRIMAZOLE 10 MG/1
10 LOZENGE ORAL
Qty: 70 LOZENGE | Refills: 0 | Status: SHIPPED | OUTPATIENT
Start: 2025-07-10 | End: 2025-07-24

## 2025-07-10 SDOH — HEALTH STABILITY: PHYSICAL HEALTH: ON AVERAGE, HOW MANY MINUTES DO YOU ENGAGE IN EXERCISE AT THIS LEVEL?: 0 MIN

## 2025-07-10 SDOH — HEALTH STABILITY: PHYSICAL HEALTH: ON AVERAGE, HOW MANY DAYS PER WEEK DO YOU ENGAGE IN MODERATE TO STRENUOUS EXERCISE (LIKE A BRISK WALK)?: 0 DAYS

## 2025-07-10 ASSESSMENT — PAIN SCALES - GENERAL: PAINLEVEL_OUTOF10: NO PAIN (0)

## 2025-07-10 ASSESSMENT — SOCIAL DETERMINANTS OF HEALTH (SDOH): HOW OFTEN DO YOU GET TOGETHER WITH FRIENDS OR RELATIVES?: TWICE A WEEK

## 2025-07-10 NOTE — PROGRESS NOTES
Preventive Care Visit  [facility]  William Ramos NP, Nurse Practitioner Primary Care  Jul 10, 2025      Assessment & Plan     Thrush, oral  - Hemoglobin A1c; Future  - CBC with platelets and differential; Future  - clotrimazole (MYCELEX) 10 MG lozenge; Place 1 lozenge (10 mg) inside cheek 5 times daily for 14 days.  - Adult ENT  Referral; Future  2nd episode of thrush  in 3 months. No known risk factors. Will perform labs to look for immunodeficiency (diabetes check, CBC). Recommend ENT follow-up due to unknown cause.    Encounter for therapeutic drug monitoring  - Basic metabolic panel  (Ca, Cl, CO2, Creat, Gluc, K, Na, BUN); Future    Screening for heart disease  - Lipid panel reflex to direct LDL Fasting; Future    Encounter for annual physical exam    Patient has been advised of split billing requirements and indicates understanding: Yes    BMI  Estimated body mass index is 27.8 kg/m  as calculated from the following:    Height as of this encounter: 1.829 m (6').    Weight as of this encounter: 93 kg (205 lb).   Weight management plan: Discussed healthy diet and exercise guidelines    Counseling  Appropriate preventive services were addressed with this patient via screening, questionnaire, or discussion as appropriate for fall prevention, nutrition, physical activity, Tobacco-use cessation, social engagement, weight loss and cognition.  Checklist reviewing preventive services available has been given to the patient.  Reviewed patient's diet, addressing concerns and/or questions.   Reviewed preventive health counseling, as reflected in patient instructions       Regular exercise       Healthy diet/nutrition        Subjective   Will is a 29 year old, presenting for the following:  Physical    Bad breath. Went to urgent care and advised that he had thrush. He was advised to follow-up with ENT for thrush.  On lithium which causes pretty bad dry mouth. Brushing teeth  3x/day. He feels that the symptoms have returned.  Moving to [place] in 1 year. Needs to speak the [place]ulate.  Partner was born in [place].    Lost weight down to 190s, then back up to 210 lbs.     Lithium for the last 6 years: manic episode last time was 2018. 5 months ago, patient had a depressive episode after losing his job. Always right below threshold. Never misses doses of his lithium. He had halved his dose for a time to spread out his dosing scheme.     Occupation: Works for [employer] (largest [employer] in the [employer]).     Diet: Does not gravitate towards candy. Carbs are a big thing for him. 3-4 servings of vegetables per day.  Not enough fruit. Binge eating well controlled.  Exercise: nothing currently. Likes running and swimming.  Sleep: perfect. 7-8 hours per night.    Mental health: decided to cut off SSRIs. Cut his sertraline off - felt a week of being emotional. Self-harm thoughts. Had crazy dreams coming off.      7/10/2025     1:26 PM   Additional Questions   Roomed by helena pastrana          HPI            7/10/2025   General Health   How would you rate your overall physical health? (!) FAIR   Feel stress (tense, anxious, or unable to sleep) Not at all         7/10/2025   Nutrition   Three or more servings of calcium each day? Yes   Diet: Regular (no restrictions)   How many servings of fruit and vegetables per day? (!) 0-1   How many sweetened beverages each day? 0-1         7/10/2025   Exercise   Days per week of moderate/strenous exercise 0 days   Average minutes spent exercising at this level 0 min   (!) EXERCISE CONCERN      7/10/2025   Social Factors   Frequency of gathering with friends or relatives Twice a week   Worry food won't last until get money to buy more No   Food not last or not have enough money for food? No   Do you have housing? (Housing is defined as stable permanent housing and does not include staying outside in a car, in a tent, in an abandoned building, in an overnight  shelter, or couch-surfing.) Yes   Are you worried about losing your housing? No   Lack of transportation? No   Unable to get utilities (heat,electricity)? No   Want help with housing or utility concern? No         7/10/2025   Dental   Dentist two times every year? Yes               7/10/2025   Substance Use   Alcohol more than 3/day or more than 7/wk No   Do you use any other substances recreationally? No     Social History     Tobacco Use    Smoking status: Never     Passive exposure: Past (Dad smoked)    Smokeless tobacco: Never   Vaping Use    Vaping status: Never Used   Substance Use Topics    Alcohol use: Not Currently     Comment: 1 glass whiskey once per month    Drug use: Never             7/10/2025   One time HIV Screening   Previous HIV test? Yes         7/10/2025   STI Screening   New sexual partner(s) since last STI/HIV test? No         7/10/2025   Contraception/Family Planning   Questions about contraception or family planning No        Reviewed and updated as needed this visit by Provider                             Objective    Exam  /76   Pulse 99   Temp 98.1  F (36.7  C) (Temporal)   Resp 16   Ht 1.829 m (6')   Wt 93 kg (205 lb)   SpO2 98%   BMI 27.80 kg/m     Estimated body mass index is 27.8 kg/m  as calculated from the following:    Height as of this encounter: 1.829 m (6').    Weight as of this encounter: 93 kg (205 lb).    Physical Exam  GENERAL: alert and no distress  EYES: Eyes grossly normal to inspection, PERRL and conjunctivae and sclerae normal  HENT: Thrush noted on lateral aspects of mouth. ear canals and TM's normal, nose and mouth without ulcers or lesions  NECK: no adenopathy, no asymmetry, masses, or scars  RESP: lungs clear to auscultation - no rales, rhonchi or wheezes  CV: regular rate and rhythm, normal S1 S2, no S3 or S4, no murmur, click or rub, no peripheral edema  ABDOMEN: soft, nontender, no hepatosplenomegaly, no masses and bowel sounds normal  MS: no gross  musculoskeletal defects noted, no edema  SKIN: no suspicious lesions or rashes  NEURO: Normal strength and tone, mentation intact and speech normal  PSYCH: mentation appears normal, affect normal/bright        Signed Electronically by: William Ramos NP

## 2025-07-14 ENCOUNTER — PATIENT OUTREACH (OUTPATIENT)
Dept: CARE COORDINATION | Facility: CLINIC | Age: 30
End: 2025-07-14
Payer: COMMERCIAL

## 2025-07-15 ENCOUNTER — TELEPHONE (OUTPATIENT)
Dept: OTOLARYNGOLOGY | Facility: CLINIC | Age: 30
End: 2025-07-15
Payer: COMMERCIAL

## 2025-07-15 NOTE — TELEPHONE ENCOUNTER
[facility] Call Center    Phone Message    May a detailed message be left on voicemail: yes     Reason for Call: Appointment Intake    Referring Provider Name: KAYKAY GONSALEZ   Diagnosis and/or Symptoms: Oral Thrush    TE to clinic of patient's choice, diagnosis not in protocols. Please reach out to patient for scheduling. Thank you.     Action Taken: Message routed to:  Other: ENT    Travel Screening: Not Applicable     Date of Service:

## 2025-07-15 NOTE — CONFIDENTIAL NOTE
Writer called and spoke with patient regarding referral to ENT for suspected oral thrush. Pt states a few months ago his mouth started becoming dry with the presence of a foul odor despite good oral care. He saw his primary care provider who treated him with antifungal lozenges, which resolved the issue, but now the same issue is returning again. Pt does report presence of white patches inside of mouth. Writer scheduled pt to see Seth Carvajal PA-C on 07/25/2025 at 8:00 am at the [facility] location. Pt verbalized understanding and is in agreement with appointment date/time/location.       Snehal Ferguson RN on 7/15/2025 at 11:42 AM

## 2025-07-21 ENCOUNTER — MYC REFILL (OUTPATIENT)
Dept: PSYCHIATRY | Facility: CLINIC | Age: 30
End: 2025-07-21
Payer: COMMERCIAL

## 2025-07-21 DIAGNOSIS — F90.2 ATTENTION DEFICIT HYPERACTIVITY DISORDER (ADHD), COMBINED TYPE: ICD-10-CM

## 2025-07-21 RX ORDER — LISDEXAMFETAMINE DIMESYLATE 30 MG/1
30 CAPSULE ORAL EVERY MORNING
Qty: 30 CAPSULE | Refills: 0 | Status: SHIPPED | OUTPATIENT
Start: 2025-07-21

## 2025-07-21 NOTE — TELEPHONE ENCOUNTER
Date of Last Office Visit: 7/3/25  Date of Next Office Visit: None; routing for A to assist pt with scheduling.    No shows since last visit: No  More than one patient-initiated cancellation (with reschedule) since last seen in clinic? No    []Medication refilled per  Medication Refill in Ambulatory Care  policy.  []Scope of Practice: refill request processed by LPN/MA  [x]Medication unable to be refilled by RN due to criteria not met as indicated below:    []Eligibility: has not had a provider visit within last 6 months   [x]Supervision: no future appointment; < 7 days before next appointment   []Compliance: no shows; cancellations; lapse in therapy   []Verification: order discrepancy; may need modification...   [] > 30-day supply request   []Advanced refill request: > 7 days before refill date   []Controlled medication   []Medication not included in policy   []Review: new med; med adjusted <= 30 days; safety alert; requires lab monitoring...   []Other:      Medication(s) requested:           Any Controlled Substance(s)? Yes   [place]  checked? N/A      Requested medication(s) verified as identical to current order? Yes    Any lapse in adherence to medication(s) greater than 5 days? Unknown     Additional action taken? routed encounter to provider for review.      Last visit treatment plan:     Plan   .ADHD  : Continue  Vyvanse 30 mg per pt request - increased on 6/11/25 after phone call with pt   2.  Bipolar-continue lithium 300 mg twice daily  3.  Depression-continue sertraline 50 mg daily-4.  Highly recommend psychotherapy- Is now seeing a therapist - meets bi-weekly   5.  Return to the clinic in approximately 12 weeks calling between visits any questions or concerns    Is lab monitoring required? No    AVELINA DENNIS RN on 7/21/2025 at 3:25 PM

## 2025-07-25 PROBLEM — G47.33 OSA (OBSTRUCTIVE SLEEP APNEA): Status: ACTIVE | Noted: 2025-07-25

## 2025-08-04 ENCOUNTER — TRANSFERRED RECORDS (OUTPATIENT)
Dept: OTOLARYNGOLOGY | Facility: CLINIC | Age: 30
End: 2025-08-04
Payer: COMMERCIAL

## 2025-08-05 ENCOUNTER — MYC MEDICAL ADVICE (OUTPATIENT)
Dept: OTOLARYNGOLOGY | Facility: CLINIC | Age: 30
End: 2025-08-05
Payer: COMMERCIAL

## 2025-08-07 ENCOUNTER — MYC MEDICAL ADVICE (OUTPATIENT)
Dept: OTOLARYNGOLOGY | Facility: CLINIC | Age: 30
End: 2025-08-07
Payer: COMMERCIAL

## 2025-08-20 ENCOUNTER — MYC REFILL (OUTPATIENT)
Dept: PSYCHIATRY | Facility: CLINIC | Age: 30
End: 2025-08-20
Payer: COMMERCIAL

## 2025-08-20 DIAGNOSIS — F90.2 ATTENTION DEFICIT HYPERACTIVITY DISORDER (ADHD), COMBINED TYPE: ICD-10-CM

## 2025-08-20 DIAGNOSIS — F31.70 BIPOLAR I DISORDER IN REMISSION: ICD-10-CM

## 2025-08-20 RX ORDER — LISDEXAMFETAMINE DIMESYLATE 30 MG/1
30 CAPSULE ORAL EVERY MORNING
Qty: 30 CAPSULE | Refills: 0 | Status: SHIPPED | OUTPATIENT
Start: 2025-08-20

## 2025-08-20 RX ORDER — LISDEXAMFETAMINE DIMESYLATE 30 MG/1
30 CAPSULE ORAL EVERY MORNING
Qty: 30 CAPSULE | Refills: 0 | Status: SHIPPED | OUTPATIENT
Start: 2025-08-20 | End: 2025-08-20

## 2025-08-20 RX ORDER — LITHIUM CARBONATE 300 MG/1
600 TABLET, FILM COATED, EXTENDED RELEASE ORAL DAILY
Qty: 180 TABLET | Refills: 0 | Status: SHIPPED | OUTPATIENT
Start: 2025-08-20

## 2025-08-20 RX ORDER — LISDEXAMFETAMINE DIMESYLATE 30 MG/1
30 CAPSULE ORAL EVERY MORNING
Qty: 30 CAPSULE | Refills: 0 | Status: CANCELLED | OUTPATIENT
Start: 2025-08-20